# Patient Record
Sex: FEMALE | Race: WHITE | NOT HISPANIC OR LATINO | Employment: FULL TIME | URBAN - METROPOLITAN AREA
[De-identification: names, ages, dates, MRNs, and addresses within clinical notes are randomized per-mention and may not be internally consistent; named-entity substitution may affect disease eponyms.]

---

## 2017-01-31 ENCOUNTER — MYC MEDICAL ADVICE (OUTPATIENT)
Dept: FAMILY MEDICINE | Facility: CLINIC | Age: 27
End: 2017-01-31

## 2017-01-31 DIAGNOSIS — K58.0 IRRITABLE BOWEL SYNDROME WITH DIARRHEA: Primary | ICD-10-CM

## 2017-02-17 ENCOUNTER — OFFICE VISIT (OUTPATIENT)
Dept: FAMILY MEDICINE | Facility: OTHER | Age: 27
End: 2017-02-17

## 2017-02-17 VITALS
HEIGHT: 65 IN | WEIGHT: 198 LBS | DIASTOLIC BLOOD PRESSURE: 72 MMHG | HEART RATE: 117 BPM | OXYGEN SATURATION: 98 % | TEMPERATURE: 98.4 F | RESPIRATION RATE: 20 BRPM | BODY MASS INDEX: 32.99 KG/M2 | SYSTOLIC BLOOD PRESSURE: 110 MMHG

## 2017-02-17 DIAGNOSIS — Z01.818 PREOP GENERAL PHYSICAL EXAM: Primary | ICD-10-CM

## 2017-02-17 DIAGNOSIS — S82.891K ANKLE FRACTURE, RIGHT, CLOSED, WITH NONUNION, SUBSEQUENT ENCOUNTER: ICD-10-CM

## 2017-02-17 PROCEDURE — 99214 OFFICE O/P EST MOD 30 MIN: CPT | Performed by: NURSE PRACTITIONER

## 2017-02-17 RX ORDER — ASPIRIN 81 MG/1
81 TABLET, CHEWABLE ORAL DAILY
Qty: 108 TABLET | Refills: 3
Start: 2017-02-17 | End: 2017-07-20

## 2017-02-17 NOTE — PATIENT INSTRUCTIONS
Don't take any Ibuprofen prior to surgery  Ask the surgeon about your Aspirin.       Before Your Surgery      Call your surgeon if there is any change in your health. This includes signs of a cold or flu (such as a sore throat, runny nose, cough, rash or fever).    Do not smoke, drink alcohol or take over the counter medicine (unless your surgeon or primary care doctor tells you to) for the 24 hours before and after surgery.    If you take prescribed drugs: Follow your doctor s orders about which medicines to take and which to stop until after surgery.    Eating and drinking prior to surgery: follow the instructions from your surgeon    Take a shower or bath the night before surgery. Use the soap your surgeon gave you to gently clean your skin. If you do not have soap from your surgeon, use your regular soap. Do not shave or scrub the surgery site.  Wear clean pajamas and have clean sheets on your bed.

## 2017-02-17 NOTE — MR AVS SNAPSHOT
After Visit Summary   2/17/2017    Wyatt Vines    MRN: [id number]           Patient Information     Date Of Birth          1990        Visit Information        Provider Department      2/17/2017 1:20 PM Linnette Garcia APRN [facility]        Today's Diagnoses     Preop general physical exam    -  1      Care Instructions      Don't take any Ibuprofen prior to surgery  Ask the surgeon about your Aspirin.       Before Your Surgery      Call your surgeon if there is any change in your health. This includes signs of a cold or flu (such as a sore throat, runny nose, cough, rash or fever).    Do not smoke, drink alcohol or take over the counter medicine (unless your surgeon or primary care doctor tells you to) for the 24 hours before and after surgery.    If you take prescribed drugs: Follow your doctor s orders about which medicines to take and which to stop until after surgery.    Eating and drinking prior to surgery: follow the instructions from your surgeon    Take a shower or bath the night before surgery. Use the soap your surgeon gave you to gently clean your skin. If you do not have soap from your surgeon, use your regular soap. Do not shave or scrub the surgery site.  Wear clean pajamas and have clean sheets on your bed.         Follow-ups after your visit        Who to contact     If you have questions or need follow up information about today's clinic visit or your schedule please contact [facility] directly at [phone].  Normal or non-critical lab and imaging results will be communicated to you by MyChart, letter or phone within 4 business days after the clinic has received the results. If you do not hear from us within 7 days, please contact the clinic through [employer] or phone. If you have a critical or abnormal lab result, we will notify you by phone as soon as possible.  Submit refill requests through "[employer] or call your pharmacy and they will  "forward the refill request to us. Please allow 3 business days for your refill to be completed.          Additional Information About Your Visit        [employer] Information     [employer] gives you secure access to your electronic health record. If you see a primary care provider, you can also send messages to your care team and make appointments. If you have questions, please call your primary care clinic.  If you do not have a primary care provider, please call [phone] and they will assist you.        Care EveryWhere ID     This is your Care EveryWhere ID. This could be used by other organizations to access your [facility] medical records  [id number]        Your Vitals Were     Pulse Temperature Respirations Height Last Period Pulse Oximetry    117 98.4  F (36.9  C) (Tympanic) 20 5' 5.2\" (1.656 m) 02/11/2017 98%    Breastfeeding? BMI (Body Mass Index)                No 32.75 kg/m2           Blood Pressure from Last 3 Encounters:   02/17/17 110/72   12/28/16 120/70   08/03/16 136/78    Weight from Last 3 Encounters:   02/17/17 198 lb (89.8 kg)   08/03/16 203 lb (92.1 kg)   02/29/16 210 lb (95.3 kg)              Today, you had the following     No orders found for display         Today's Medication Changes          These changes are accurate as of: 2/17/17  1:53 PM.  If you have any questions, ask your nurse or doctor.               Start taking these medicines.        Dose/Directions    aspirin 81 MG chewable tablet   Used for:  Preop general physical exam   Started by:  Linnette Garcia APRN CNP        Dose:  81 mg   Take 1 tablet (81 mg) by mouth daily   Quantity:  108 tablet   Refills:  3            Where to get your medicines      Some of these will need a paper prescription and others can be bought over the counter.  Ask your nurse if you have questions.     You don't need a prescription for these medications     aspirin 81 MG chewable tablet                Primary Care Provider Office Phone # Fax #    " Amber Crockett PA-C [phone]       [address] [place] [address]        Thank you!     Thank you for choosing [facility]  for your care. Our goal is always to provide you with excellent care. Hearing back from our patients is one way we can continue to improve our services. Please take a few minutes to complete the written survey that you may receive in the mail after your visit with us. Thank you!             Your Updated Medication List - Protect others around you: Learn how to safely use, store and throw away your medicines at www.disposemymeds.org.          This list is accurate as of: 2/17/17  1:53 PM.  Always use your most recent med list.                   Brand Name Dispense Instructions for use    amitriptyline 10 MG tablet    ELAVIL    180 tablet    Take 2 tablets (20 mg) by mouth At Bedtime       aspirin 81 MG chewable tablet     108 tablet    Take 1 tablet (81 mg) by mouth daily
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
## 2017-02-17 NOTE — NURSING NOTE
"Chief Complaint   Patient presents with     Pre-Op Exam     [place] [facility] [place]       Initial /72 (BP Location: Left arm, Cuff Size: Adult Large)  Pulse 117  Temp 98.4  F (36.9  C) (Tympanic)  Resp 20  Ht 5' 5.2\" (1.656 m)  Wt 198 lb (89.8 kg)  LMP 02/11/2017  SpO2 98%  Breastfeeding? No  BMI 32.75 kg/m2 Estimated body mass index is 32.75 kg/(m^2) as calculated from the following:    Height as of this encounter: 5' 5.2\" (1.656 m).    Weight as of this encounter: 198 lb (89.8 kg).  Medication Reconciliation: complete   ................Bhaskar Portillo LPN,   February 17, 2017,      1:30 PM,   [facility]    "

## 2017-03-09 ENCOUNTER — TRANSFERRED RECORDS (OUTPATIENT)
Dept: HEALTH INFORMATION MANAGEMENT | Facility: CLINIC | Age: 27
End: 2017-03-09

## 2017-05-23 ENCOUNTER — MYC MEDICAL ADVICE (OUTPATIENT)
Dept: FAMILY MEDICINE | Facility: CLINIC | Age: 27
End: 2017-05-23

## 2017-05-24 ENCOUNTER — ALLIED HEALTH/NURSE VISIT (OUTPATIENT)
Dept: FAMILY MEDICINE | Facility: CLINIC | Age: 27
End: 2017-05-24
Payer: COMMERCIAL

## 2017-05-24 ENCOUNTER — TELEPHONE (OUTPATIENT)
Dept: FAMILY MEDICINE | Facility: CLINIC | Age: 27
End: 2017-05-24

## 2017-05-24 VITALS — WEIGHT: 195.5 LBS | SYSTOLIC BLOOD PRESSURE: 110 MMHG | DIASTOLIC BLOOD PRESSURE: 78 MMHG | BODY MASS INDEX: 32.33 KG/M2

## 2017-05-24 DIAGNOSIS — N91.2 ABSENCE OF MENSTRUATION: Primary | ICD-10-CM

## 2017-05-24 LAB
B-HCG SERPL-ACNC: 192 IU/L (ref 0–5)
BETA HCG QUAL IFA URINE: NEGATIVE

## 2017-05-24 PROCEDURE — 99207 ZZC NO CHARGE NURSE ONLY: CPT

## 2017-05-24 PROCEDURE — 36415 COLL VENOUS BLD VENIPUNCTURE: CPT | Performed by: PHYSICIAN ASSISTANT

## 2017-05-24 PROCEDURE — 84702 CHORIONIC GONADOTROPIN TEST: CPT | Performed by: PHYSICIAN ASSISTANT

## 2017-05-24 PROCEDURE — 84703 CHORIONIC GONADOTROPIN ASSAY: CPT | Performed by: PHYSICIAN ASSISTANT

## 2017-05-24 NOTE — NURSING NOTE
Wyatt Bruno is a 27 year old here today for a pregnancy test.  LMP: Patient's last menstrual period was 2017 (exact date).  Wt: 0 lbs 0 oz.    Symptoms include nausea &/or vomiting and fatigue.    Wyatt informed of positive pregnancy test results. BEATRICE: 18    Educational advice given: nutrition, smoking and drugs & alcohol.    Current medications reviewed: Yes    Previous pregnancy history remarkable for: none.    Plan: schedule appointment with OB Educator and/or OB class, follow-up appointment with GHULAM Crockett for pre- care, take multivitamin or pre-osei vitamins and OB Education packet given.    She is to call back if she has any questions or concerns.  She is advised to notify a provider immediately if she experiences any severe cramping or abdominal pain or any vaginal bleeding.  Darlene Lewis RN

## 2017-05-24 NOTE — MR AVS SNAPSHOT
After Visit Summary   5/24/2017    Wyatt Bruno    MRN: [id number]           Patient Information     Date Of Birth          1990        Visit Information        Provider Department      5/24/2017 5:00 PM NL RN TEAM A, [facility]        Today's Diagnoses     Absence of menstruation    -  1       Follow-ups after your visit        Who to contact     If you have questions or need follow up information about today's clinic visit or your schedule please contact [facility] directly at [phone].  Normal or non-critical lab and imaging results will be communicated to you by [employer], letter or phone within 4 business days after the clinic has received the results. If you do not hear from us within 7 days, please contact the clinic through [employer] or phone. If you have a critical or abnormal lab result, we will notify you by phone as soon as possible.  Submit refill requests through [employer] or call your pharmacy and they will forward the refill request to us. Please allow 3 business days for your refill to be completed.          Additional Information About Your Visit        MyChart Information     [employer] gives you secure access to your electronic health record. If you see a primary care provider, you can also send messages to your care team and make appointments. If you have questions, please call your primary care clinic.  If you do not have a primary care provider, please call [phone] and they will assist you.        Care EveryWhere ID     This is your Care EveryWhere ID. This could be used by other organizations to access your [facility] medical records  [id number]        Your Vitals Were     Last Period Breastfeeding? BMI (Body Mass Index)             04/28/2017 (Exact Date) No 32.33 kg/m2          Blood Pressure from Last 3 Encounters:   05/24/17 110/78   02/17/17 110/72   12/28/16 120/70    Weight from Last 3 Encounters:   05/24/17 195 lb 8 oz (88.7 kg)    02/17/17 198 lb (89.8 kg)   08/03/16 203 lb (92.1 kg)              We Performed the Following     Beta HCG qual IFA urine     HCG, quantitative, pregnancy        Primary Care Provider Office Phone # Fax #    Amber Crockett PA-C [phone]       [address] [place] [address]        Thank you!     Thank you for choosing [facility]  for your care. Our goal is always to provide you with excellent care. Hearing back from our patients is one way we can continue to improve our services. Please take a few minutes to complete the written survey that you may receive in the mail after your visit with us. Thank you!             Your Updated Medication List - Protect others around you: Learn how to safely use, store and throw away your medicines at www.disposemymeds.org.          This list is accurate as of: 5/24/17  5:21 PM.  Always use your most recent med list.                   Brand Name Dispense Instructions for use    amitriptyline 10 MG tablet    ELAVIL    180 tablet    Take 2 tablets (20 mg) by mouth At Bedtime       aspirin 81 MG chewable tablet     108 tablet    Take 1 tablet (81 mg) by mouth daily

## 2017-05-24 NOTE — TELEPHONE ENCOUNTER
Patient is here today for a pregnancy test.  She is reporting she was at [facility] yesterday and was getting a CT of her ankle, so was informed she needed to take a pregnancy test.  This test came back positive, so she came in today to get the process started with PCP.  Today patient's urine HCG is negative.    Patient is given folder and vitals were taken.  Patient was informed to go to lab for blood test and we will call her tomorrow with results and next steps.   Patient understands and agrees to this plan.    Routing to PCP for further advice.    Darlene Lewis RN

## 2017-05-25 ENCOUNTER — TELEPHONE (OUTPATIENT)
Dept: FAMILY MEDICINE | Facility: CLINIC | Age: 27
End: 2017-05-25

## 2017-05-25 NOTE — TELEPHONE ENCOUNTER
Reason for Call:  Request for results:    Name of test or procedure: HCG Quantitative Serum    Date of test of procedure: 05/24/17    Location of the test or procedure: [facility]    OK to leave the result message on voice mail or with a family member? YES    Phone number Patient can be reached at:  Home number on file [phone] (home)    Additional comments:     Call taken on 5/25/2017 at 12:58 PM by Arely Nuñez

## 2017-05-25 NOTE — TELEPHONE ENCOUNTER
Called pt and informed her of the results and transferred her to make an appt with OB intake.  Darlene Ferrer MA

## 2017-05-25 NOTE — TELEPHONE ENCOUNTER
Quant HCG is positive - will need to coordinate OB visit with Robyn.  Electronically signed by Amber Crockett PA-C  5/25/2017

## 2017-05-26 ENCOUNTER — HEALTH MAINTENANCE LETTER (OUTPATIENT)
Age: 27
End: 2017-05-26

## 2017-05-30 ENCOUNTER — PRENATAL OFFICE VISIT (OUTPATIENT)
Dept: FAMILY MEDICINE | Facility: CLINIC | Age: 27
End: 2017-05-30
Payer: COMMERCIAL

## 2017-05-30 DIAGNOSIS — Z34.80 OTHER NORMAL PREGNANCY, NOT FIRST: Primary | ICD-10-CM

## 2017-05-30 PROCEDURE — 99207 ZZC FIRST OB VISIT: CPT

## 2017-05-30 RX ORDER — PRENATAL VIT/IRON FUM/FOLIC AC 27MG-0.8MG
1 TABLET ORAL DAILY
COMMUNITY
End: 2018-03-12

## 2017-05-30 NOTE — PROGRESS NOTES
1. Have you had chicken pox?   Yes    Genetic Screening    Has the patient, baby's father, or anyone in either family had:     1. Thalassemia and and MCV result less than 80?No   2.  Neural tube defect? No   3.  Congenital heart defect?No   4. Down's syndrome?No   5.  Maurizio-Sachs disease?No   6. Sickle Cell disease or trait?No   7. Hemophilia or other inherited problems of blood?No   8. Muscular dystropy?No   9.  Cystic fibrosis?No  10. Chamberino's Chorea?No  11. Mental Retardation or autism?  No         If yes, was the person tested for Fragile X?   12. Any other inherited genetic or chromosomal disorders?No  13. Metabolic disorders such as diabetes or PKU?No  14. A child born with defects not listed above?No  15. Recurrent pregnancy loss or stillbirth?No  16.  Has the patient had any medications/street drugs/alcohol since her last menstrual period?No  18.  Does the patient or baby's father have any other genetic risks. No

## 2017-05-30 NOTE — MR AVS SNAPSHOT
After Visit Summary   5/30/2017    Wyatt Bruno    MRN: [id number]           Patient Information     Date Of Birth          1990        Visit Information        Provider Department      5/30/2017 12:00 PM NL OB INTAKE [facility]        Today's Diagnoses     Other normal pregnancy, not first    -  1       Follow-ups after your visit        Your next 10 appointments already scheduled     Jun 26, 2017  4:45 PM CDT   US OB < 14 WEEKS SINGLE with PHUS1   [facility] Ul[facility]ound ([facility])    [address] [place] [address] [id number] [phone]           Please bring a list of your medicines (including vitamins, minerals and over-the-counter drugs). Also, tell your doctor about any allergies you may have. Wear comfortable clothes and leave your valuables at home.  If you re less than 20 weeks drink four 8-ounce glasses of fluid an hour before your exam. If you need to empty your bladder before your exam, try to release only a little urine. Then, drink another glass of fluid.  You may have up to two family members in the exam room. If you bring a small child, an adult must be there to care for him or her.  Please call the Imaging Department at your exam site with any questions.            Jun 26, 2017  5:30 PM CDT   LAB with NL LAB PMC   [facility] ([facility])    [address] [place] [address] [id number] [phone]           Patient must bring picture ID.  Patient should be prepared to give a urine specimen  Please do not eat 10-12 hours before your appointment if you are coming in fasting for labs on lipids, cholesterol, or glucose (sugar).  Pregnant women should follow their Care Team instructions. Water with medications is okay. Do not drink coffee or other fluids.   If you have concerns about taking  your medications, please ask at office or if scheduling via Pieceable, send a message by clicking on Secure  Messaging, Message Your Care Team.            Jul 20, 2017  5:15 PM CDT   New Prenatal with Amber Crockett PA-C   [facility] ([facility])    [address] [phone] [phone]              Future tests that were ordered for you today     Open Standing Orders        Priority Remaining Interval Expires Ordered    Anti treponema EIA Routine 1/1 7/1/2017 5/30/2017    HIV Antigen Antibody Combo Routine 1/1 7/1/2017 5/30/2017    HEPATITIS B SURFACE ANTIGEN Routine 1/1 7/1/2017 5/30/2017    CBC WITH PLATELETS Routine 1/1 7/1/2017 5/30/2017    Rubella Antibody IgG Quantitative Routine 1/1 7/1/2017 5/30/2017    ABO/Rh type and screen Routine 1/1 7/1/2017 5/30/2017    *UA reflex to Microscopic and Culture ([place]; [facility]; [facility]; [facility]; [facility]; [facility]; [place]; [place]) Routine 1/1 7/1/2017 5/30/2017          Open Future Orders        Priority Expected Expires Ordered    US OB < 14 Weeks Single Routine 5/30/2017 5/30/2018 5/30/2017            Who to contact     If you have questions or need follow up information about today's clinic visit or your schedule please contact [facility] directly at [phone].  Normal or non-critical lab and imaging results will be communicated to you by MyChart, letter or phone within 4 business days after the clinic has received the results. If you do not hear from us within 7 days, please contact the clinic through [employer] or phone. If you have a critical or abnormal lab result, we will notify you by phone as soon as possible.  Submit refill requests through AKT or call your pharmacy and they will forward the refill request to us. Please allow 3 business days for your refill to be completed.          Additional Information About Your Visit        MyChart Information     AKT gives you secure access to your electronic health record. If you see a primary care  provider, you can also send messages to your care team and make appointments. If you have questions, please call your primary care clinic.  If you do not have a primary care provider, please call [phone] and they will assist you.        Care EveryWhere ID     This is your Care EveryWhere ID. This could be used by other organizations to access your [place] medical records  [id number]        Your Vitals Were     Last Period                   04/28/2017 (Exact Date)            Blood Pressure from Last 3 Encounters:   05/24/17 110/78   02/17/17 110/72   12/28/16 120/70    Weight from Last 3 Encounters:   05/24/17 195 lb 8 oz (88.7 kg)   02/17/17 198 lb (89.8 kg)   08/03/16 203 lb (92.1 kg)               Primary Care Provider Office Phone # Fax #    Amber Crockett PA-C [phone]       [address] [place] [address]        Thank you!     Thank you for choosing [facility]  for your care. Our goal is always to provide you with excellent care. Hearing back from our patients is one way we can continue to improve our services. Please take a few minutes to complete the written survey that you may receive in the mail after your visit with us. Thank you!             Your Updated Medication List - Protect others around you: Learn how to safely use, store and throw away your medicines at www.disposemymeds.org.          This list is accurate as of: 5/30/17 12:40 PM.  Always use your most recent med list.                   Brand Name Dispense Instructions for use    amitriptyline 10 MG tablet    ELAVIL    180 tablet    Take 2 tablets (20 mg) by mouth At Bedtime       aspirin 81 MG chewable tablet     108 tablet    Take 1 tablet (81 mg) by mouth daily       prenatal multivitamin  plus iron 27-0.8 MG Tabs per tablet      Take 1 tablet by mouth daily

## 2017-06-06 ENCOUNTER — MYC MEDICAL ADVICE (OUTPATIENT)
Dept: FAMILY MEDICINE | Facility: CLINIC | Age: 27
End: 2017-06-06

## 2017-06-06 NOTE — TELEPHONE ENCOUNTER
This is fine for her to use.  There is unlikely to be high enough content to be an issue.  Will have staff notify patient.    Adán Germain MD

## 2017-06-26 ENCOUNTER — HOSPITAL ENCOUNTER (OUTPATIENT)
Dept: ULTRASOUND IMAGING | Facility: CLINIC | Age: 27
Discharge: HOME | End: 2017-06-26
Attending: PHYSICIAN ASSISTANT | Admitting: PHYSICIAN ASSISTANT
Payer: COMMERCIAL

## 2017-06-26 DIAGNOSIS — Z34.80 OTHER NORMAL PREGNANCY, NOT FIRST: ICD-10-CM

## 2017-06-26 LAB
ALBUMIN UR-MCNC: NEGATIVE MG/DL
APPEARANCE UR: CLEAR
BILIRUB UR QL STRIP: NEGATIVE
COLOR UR AUTO: YELLOW
ERYTHROCYTE [DISTWIDTH] IN BLOOD BY AUTOMATED COUNT: 16.4 % (ref 10–15)
GLUCOSE UR STRIP-MCNC: NEGATIVE MG/DL
HCT VFR BLD AUTO: 35.8 % (ref 35–47)
HGB BLD-MCNC: 11.2 G/DL (ref 11.7–15.7)
HGB UR QL STRIP: NEGATIVE
KETONES UR STRIP-MCNC: NEGATIVE MG/DL
LEUKOCYTE ESTERASE UR QL STRIP: NEGATIVE
MCH RBC QN AUTO: 26 PG (ref 26.5–33)
MCHC RBC AUTO-ENTMCNC: 31.3 G/DL (ref 31.5–36.5)
MCV RBC AUTO: 83 FL (ref 78–100)
NITRATE UR QL: NEGATIVE
PH UR STRIP: 6 PH (ref 5–7)
PLATELET # BLD AUTO: 234 10E9/L (ref 150–450)
RBC # BLD AUTO: 4.31 10E12/L (ref 3.8–5.2)
SP GR UR STRIP: 1.01 (ref 1–1.03)
URN SPEC COLLECT METH UR: NORMAL
UROBILINOGEN UR STRIP-MCNC: 0 MG/DL (ref 0–2)
WBC # BLD AUTO: 7.3 10E9/L (ref 4–11)

## 2017-06-26 PROCEDURE — 86762 RUBELLA ANTIBODY: CPT | Performed by: PHYSICIAN ASSISTANT

## 2017-06-26 PROCEDURE — 86901 BLOOD TYPING SEROLOGIC RH(D): CPT | Performed by: PHYSICIAN ASSISTANT

## 2017-06-26 PROCEDURE — 81003 URINALYSIS AUTO W/O SCOPE: CPT | Performed by: PHYSICIAN ASSISTANT

## 2017-06-26 PROCEDURE — 85027 COMPLETE CBC AUTOMATED: CPT | Performed by: PHYSICIAN ASSISTANT

## 2017-06-26 PROCEDURE — 86900 BLOOD TYPING SEROLOGIC ABO: CPT | Performed by: PHYSICIAN ASSISTANT

## 2017-06-26 PROCEDURE — 87340 HEPATITIS B SURFACE AG IA: CPT | Performed by: PHYSICIAN ASSISTANT

## 2017-06-26 PROCEDURE — 86780 TREPONEMA PALLIDUM: CPT | Performed by: PHYSICIAN ASSISTANT

## 2017-06-26 PROCEDURE — 36415 COLL VENOUS BLD VENIPUNCTURE: CPT | Performed by: PHYSICIAN ASSISTANT

## 2017-06-26 PROCEDURE — 87389 HIV-1 AG W/HIV-1&-2 AB AG IA: CPT | Performed by: PHYSICIAN ASSISTANT

## 2017-06-26 PROCEDURE — 86850 RBC ANTIBODY SCREEN: CPT | Performed by: PHYSICIAN ASSISTANT

## 2017-06-26 PROCEDURE — 76801 OB US < 14 WKS SINGLE FETUS: CPT

## 2017-06-27 LAB
ABO + RH BLD: NORMAL
ABO + RH BLD: NORMAL
BLD GP AB SCN SERPL QL: NORMAL
BLOOD BANK CMNT PATIENT-IMP: NORMAL
HBV SURFACE AG SERPL QL IA: NONREACTIVE
HIV 1+2 AB+HIV1 P24 AG SERPL QL IA: NORMAL
SPECIMEN EXP DATE BLD: NORMAL
T PALLIDUM IGG+IGM SER QL: NEGATIVE

## 2017-06-28 LAB — RUBV IGG SERPL IA-ACNC: 37 IU/ML

## 2017-07-03 NOTE — PROGRESS NOTES
[facility] - Your early ultrasound shows a normally progressing pregnancy with due date of 1/31/2018.  We will review and discuss further/ at your upcoming first OB visit. Typically your first OB visit should be at about the 12 week mary - if you are having problems getting in let us know.

## 2017-07-20 ENCOUNTER — PRENATAL OFFICE VISIT (OUTPATIENT)
Dept: FAMILY MEDICINE | Facility: CLINIC | Age: 27
End: 2017-07-20
Payer: COMMERCIAL

## 2017-07-20 VITALS
WEIGHT: 193 LBS | HEART RATE: 70 BPM | TEMPERATURE: 99.1 F | SYSTOLIC BLOOD PRESSURE: 116 MMHG | OXYGEN SATURATION: 98 % | BODY MASS INDEX: 31.92 KG/M2 | RESPIRATION RATE: 18 BRPM | DIASTOLIC BLOOD PRESSURE: 80 MMHG

## 2017-07-20 DIAGNOSIS — Z34.81 ENCOUNTER FOR SUPERVISION OF OTHER NORMAL PREGNANCY IN FIRST TRIMESTER: Primary | ICD-10-CM

## 2017-07-20 DIAGNOSIS — D64.9 LOW HEMOGLOBIN: ICD-10-CM

## 2017-07-20 PROCEDURE — 99207 ZZC FIRST OB VISIT: CPT | Performed by: PHYSICIAN ASSISTANT

## 2017-07-20 PROCEDURE — 87591 N.GONORRHOEAE DNA AMP PROB: CPT | Performed by: PHYSICIAN ASSISTANT

## 2017-07-20 PROCEDURE — G0145 SCR C/V CYTO,THINLAYER,RESCR: HCPCS | Performed by: PHYSICIAN ASSISTANT

## 2017-07-20 PROCEDURE — 87491 CHLMYD TRACH DNA AMP PROBE: CPT | Performed by: PHYSICIAN ASSISTANT

## 2017-07-20 ASSESSMENT — PAIN SCALES - GENERAL: PAINLEVEL: NO PAIN (0)
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
## 2017-07-20 NOTE — NURSING NOTE
"Chief Complaint   Patient presents with     Prenatal Care       Initial /80  Pulse 70  Temp 99.1  F (37.3  C) (Tympanic)  Resp 18  Wt 193 lb (87.5 kg)  LMP 04/28/2017 (Exact Date)  SpO2 98%  BMI 31.92 kg/m2 Estimated body mass index is 31.92 kg/(m^2) as calculated from the following:    Height as of 2/17/17: 5' 5.2\" (1.656 m).    Weight as of this encounter: 193 lb (87.5 kg).  BP completed using cuff size: eduar Ferrer MA      "

## 2017-07-20 NOTE — MR AVS SNAPSHOT
After Visit Summary   7/20/2017    Wyatt Bruno    MRN: [id number]           Patient Information     Date Of Birth          1990        Visit Information        Provider Department      7/20/2017 5:15 PM Amber Crockett PA-C [facility]        Today's Diagnoses     Encounter for supervision of other normal pregnancy in first trimester    -  1       Follow-ups after your visit        Who to contact     If you have questions or need follow up information about today's clinic visit or your schedule please contact [facility] directly at [phone].  Normal or non-critical lab and imaging results will be communicated to you by "[employer]hart, letter or phone within 4 business days after the clinic has received the results. If you do not hear from us within 7 days, please contact the clinic through [employer] or phone. If you have a critical or abnormal lab result, we will notify you by phone as soon as possible.  Submit refill requests through [facility] or call your pharmacy and they will forward the refill request to us. Please allow 3 business days for your refill to be completed.          Additional Information About Your Visit        MyChart Information     [facility] gives you secure access to your electronic health record. If you see a primary care provider, you can also send messages to your care team and make appointments. If you have questions, please call your primary care clinic.  If you do not have a primary care provider, please call [phone] and they will assist you.        Care EveryWhere ID     This is your Care EveryWhere ID. This could be used by other organizations to access your [place] medical records  [id number]        Your Vitals Were     Pulse Temperature Respirations Last Period Pulse Oximetry BMI (Body Mass Index)    70 99.1  F (37.3  C) (Tympanic) 18 04/28/2017 (Exact Date) 98% 31.92 kg/m2       Blood Pressure from Last 3 Encounters:   07/20/17  116/80   05/24/17 110/78   02/17/17 110/72    Weight from Last 3 Encounters:   07/20/17 193 lb (87.5 kg)   05/24/17 195 lb 8 oz (88.7 kg)   02/17/17 198 lb (89.8 kg)              We Performed the Following     Chlamydia trachomatis PCR     Neisseria gonorrhoeae PCR     Pap imaged thin layer screen reflex to HPV if ASCUS - recommend age 25 - 29        Primary Care Provider Office Phone # Fax #    Amber Crockett PA-C [phone]       [facility] [address] [place] [address]        Equal Access to Services     [facility] : Hadii aad ku hadasho Soomaali, waaxda luqadaha, qaybta kaalmada adeegyada, octaviano giles . So [facility] [phone].    ATENCIÓN: Si habla español, tiene a grimaldo disposición servicios gratuitos de asistencia lingüística. Llame al [phone].    We comply with applicable federal civil rights laws and [place] laws. We do not discriminate on the basis of race, color, national origin, age, disability sex, sexual orientation or gender identity.            Thank you!     Thank you for choosing [facility]  for your care. Our goal is always to provide you with excellent care. Hearing back from our patients is one way we can continue to improve our services. Please take a few minutes to complete the written survey that you may receive in the mail after your visit with us. Thank you!             Your Updated Medication List - Protect others around you: Learn how to safely use, store and throw away your medicines at www.disposemymeds.org.          This list is accurate as of: 7/20/17 11:59 PM.  Always use your most recent med list.                   Brand Name Dispense Instructions for use Diagnosis    prenatal multivitamin  plus iron 27-0.8 MG Tabs per tablet      Take 1 tablet by mouth daily

## 2017-07-21 PROBLEM — D64.9 LOW HEMOGLOBIN: Status: ACTIVE | Noted: 2017-07-21

## 2017-07-21 PROBLEM — Z34.90 SUPERVISION OF NORMAL PREGNANCY: Status: ACTIVE | Noted: 2017-07-21

## 2017-07-21 PROBLEM — L90.5 SCAR OF ABDOMEN: Status: ACTIVE | Noted: 2017-07-21

## 2017-07-23 LAB
C TRACH DNA SPEC QL NAA+PROBE: NORMAL
N GONORRHOEA DNA SPEC QL NAA+PROBE: NORMAL
SPECIMEN SOURCE: NORMAL
SPECIMEN SOURCE: NORMAL

## 2017-07-24 ENCOUNTER — MYC MEDICAL ADVICE (OUTPATIENT)
Dept: FAMILY MEDICINE | Facility: CLINIC | Age: 27
End: 2017-07-24

## 2017-07-24 ENCOUNTER — ALLIED HEALTH/NURSE VISIT (OUTPATIENT)
Dept: FAMILY MEDICINE | Facility: OTHER | Age: 27
End: 2017-07-24
Payer: COMMERCIAL

## 2017-07-24 DIAGNOSIS — Z11.1 SCREENING EXAMINATION FOR PULMONARY TUBERCULOSIS: Primary | ICD-10-CM

## 2017-07-24 PROCEDURE — 86480 TB TEST CELL IMMUN MEASURE: CPT | Performed by: NURSE PRACTITIONER

## 2017-07-24 PROCEDURE — 99207 ZZC NO CHARGE NURSE ONLY: CPT

## 2017-07-24 PROCEDURE — 36415 COLL VENOUS BLD VENIPUNCTURE: CPT | Performed by: NURSE PRACTITIONER

## 2017-07-24 NOTE — LETTER
Wyatt Bruno  [address]  [place] [address]        July 26, 2017          Dear ,    We are writing to inform you of your test results.    Negative TB results.     Resulted Orders   M Tuberculosis by Quantiferon   Result Value Ref Range    M Tuberculosis Result Negative NEG    M Tuberculosis Antigen Value 0.00 IU/mL      Comment:      This is a qualitative test.  The TB antigen IU/mL value is required for   documentation on certain government reporting forms but this value should not   be used to monitor disease progression or response to therapy.   Diagnosing or excluding tuberculosis disease, and assessing the probability   of   LTBI, require a combination of epidemiological, historical, medical and   diagnostic findings that should be taken into account when interpreting   QuantiFERON TB results.         If you have any questions or concerns, please call the clinic at the number listed above.       Sincerely,        Linnette Garcia CNP

## 2017-07-24 NOTE — NURSING NOTE
Chief Complaint   Patient presents with     Allied Health Visit     TB test     Patient is pregnant so orders were placed for a blood draw per Linnette Garcia CNP.  Nell Suarez MA     7/24/2017

## 2017-07-24 NOTE — MR AVS SNAPSHOT
After Visit Summary   7/24/2017    Wyatt Bruno    MRN: [id number]           Patient Information     Date Of Birth          1990        Visit Information        Provider Department      7/24/2017 10:00 AM ALPESH VILLEDA NURSE, [facility]        Today's Diagnoses     Screening examination for pulmonary tuberculosis    -  1       Follow-ups after your visit        Who to contact     If you have questions or need follow up information about today's clinic visit or your schedule please contact [facility] directly at [phone].  Normal or non-critical lab and imaging results will be communicated to you by [employer], letter or phone within 4 business days after the clinic has received the results. If you do not hear from us within 7 days, please contact the clinic through [employer] or phone. If you have a critical or abnormal lab result, we will notify you by phone as soon as possible.  Submit refill requests through [employer] or call your pharmacy and they will forward the refill request to us. Please allow 3 business days for your refill to be completed.          Additional Information About Your Visit        MyChart Information     [employer] gives you secure access to your electronic health record. If you see a primary care provider, you can also send messages to your care team and make appointments. If you have questions, please call your primary care clinic.  If you do not have a primary care provider, please call [phone] and they will assist you.        Care EveryWhere ID     This is your Care EveryWhere ID. This could be used by other organizations to access your [place] medical records  [id number]        Your Vitals Were     Last Period                   04/28/2017 (Exact Date)            Blood Pressure from Last 3 Encounters:   07/20/17 116/80   05/24/17 110/78   02/17/17 110/72    Weight from Last 3 Encounters:   07/20/17 193 lb (87.5 kg)   05/24/17 195 lb 8 oz  (88.7 kg)   02/17/17 198 lb (89.8 kg)              We Performed the Following     M Tuberculosis by Quantiferon        Primary Care Provider Office Phone # Fax #    Amber Crockett PA-C [phone]       [place] [address] [place] [address]        Equal Access to Services     [facility]: Hadii aad ku hadasho Soomaali, waaxda luqadaha, qaybta kaalmada adeegyada, waxay idiin hayaan adeeg kharash lalongn . So [facility] [phone].    ATENCIÓN: Si habla español, tiene a grimaldo disposición servicios gratuitos de asistencia lingüística. Llame al [phone].    We comply with applicable federal civil rights laws and [place] laws. We do not discriminate on the basis of race, color, national origin, age, disability sex, sexual orientation or gender identity.            Thank you!     Thank you for choosing [facility]  for your care. Our goal is always to provide you with excellent care. Hearing back from our patients is one way we can continue to improve our services. Please take a few minutes to complete the written survey that you may receive in the mail after your visit with us. Thank you!             Your Updated Medication List - Protect others around you: Learn how to safely use, store and throw away your medicines at www.disposemymeds.org.          This list is accurate as of: 7/24/17 10:45 AM.  Always use your most recent med list.                   Brand Name Dispense Instructions for use Diagnosis    prenatal multivitamin  plus iron 27-0.8 MG Tabs per tablet      Take 1 tablet by mouth daily

## 2017-07-25 LAB
COPATH REPORT: NORMAL
M TB TUBERC IFN-G BLD QL: NEGATIVE
M TB TUBERC IFN-G/MITOGEN IGNF BLD: 0 IU/ML
PAP: NORMAL

## 2017-08-17 ENCOUNTER — PRENATAL OFFICE VISIT (OUTPATIENT)
Dept: FAMILY MEDICINE | Facility: CLINIC | Age: 27
End: 2017-08-17
Payer: COMMERCIAL

## 2017-08-17 VITALS
HEART RATE: 68 BPM | DIASTOLIC BLOOD PRESSURE: 70 MMHG | BODY MASS INDEX: 32.42 KG/M2 | WEIGHT: 196 LBS | SYSTOLIC BLOOD PRESSURE: 116 MMHG | TEMPERATURE: 98.8 F | OXYGEN SATURATION: 98 % | RESPIRATION RATE: 18 BRPM

## 2017-08-17 DIAGNOSIS — Z34.80 SUPERVISION OF OTHER NORMAL PREGNANCY, ANTEPARTUM: Primary | ICD-10-CM

## 2017-08-17 PROBLEM — Z34.90 SUPERVISION OF NORMAL PREGNANCY: Status: RESOLVED | Noted: 2017-07-21 | Resolved: 2017-08-17

## 2017-08-17 PROCEDURE — 99000 SPECIMEN HANDLING OFFICE-LAB: CPT | Performed by: PHYSICIAN ASSISTANT

## 2017-08-17 PROCEDURE — 36415 COLL VENOUS BLD VENIPUNCTURE: CPT | Performed by: PHYSICIAN ASSISTANT

## 2017-08-17 PROCEDURE — 81511 FTL CGEN ABNOR FOUR ANAL: CPT | Mod: 90 | Performed by: PHYSICIAN ASSISTANT

## 2017-08-17 PROCEDURE — 99207 ZZC PRENATAL VISIT: CPT | Performed by: PHYSICIAN ASSISTANT

## 2017-08-17 ASSESSMENT — PAIN SCALES - GENERAL: PAINLEVEL: NO PAIN (0)

## 2017-08-17 NOTE — MR AVS SNAPSHOT
After Visit Summary   8/17/2017    Wyatt Bruno    MRN: [id number]           Patient Information     Date Of Birth          1990        Visit Information        Provider Department      8/17/2017 5:15 PM Amber Crockett PA-C [facility]        Today's Diagnoses     Supervision of other normal pregnancy, antepartum    -  1       Follow-ups after your visit        Your next 10 appointments already scheduled     Sep 14, 2017  4:30 PM CDT   US OB > 14 WEEKS COMPLETE SINGLE with PHUS1   [facility] ([facility])    [address] [place] [address]   [phone]           Please bring a list of your medicines (including vitamins, minerals and over-the-counter drugs). Also, tell your doctor about any allergies you may have. Wear comfortable clothes and leave your valuables at home.  If you re less than 20 weeks drink four 8-ounce glasses of fluid an hour before your exam. If you need to empty your bladder before your exam, try to release only a little urine. Then, drink another glass of fluid.  You may have up to two family members in the exam room. If you bring a small child, an adult must be there to care for him or her.  Please call the Imaging Department at your exam site with any questions.            Sep 14, 2017  6:00 PM CDT   ESTABLISHED PRENATAL with Amber Crockett PA-C   [facility] ([facility])    [address] [place] [address]   [phone]            Oct 12, 2017  6:00 PM CDT   ESTABLISHED PRENATAL with Amber Crockett PA-C   [facility] ([facility])    [address] [place] [address]   [phone]            Nov 09, 2017  6:00 PM CST   ESTABLISHED PRENATAL with Amber Crockett PA-C   [facility] ([facility])    [address] [place] [address]   [phone]            Nov 22, 2017   4:30 PM CST   ESTABLISHED PRENATAL with Amber Crockett PA-C   [facility] ([facility])    [address] [place] [address]   [phone]            Dec 07, 2017  6:00 PM CST   ESTABLISHED PRENATAL with Amber Crockett PA-C   [facility] ([facility])    [address] [place] [address]   [phone]            Dec 21, 2017  6:00 PM CST   ESTABLISHED PRENATAL with Amber Crockett PA-C   [facility] ([facility])    [address] [place] [address]   [phone]            Jan 04, 2018  6:00 PM CST   ESTABLISHED PRENATAL with Amber Crockett PA-C   [facility] ([facility])    [address] [place] [address]   [phone]            Jan 11, 2018  6:00 PM CST   ESTABLISHED PRENATAL with Amber Crockett PA-C   [facility] ([facility])    [address] [place] [address]   [phone]            Jan 18, 2018  6:00 PM CST   ESTABLISHED PRENATAL with Amber Crockett PA-C   [facility] ([facility])    [address] [place] [address]   [phone]              Who to contact     If you have questions or need follow up information about today's clinic visit or your schedule please contact [facility] directly at [phone].  Normal or non-critical lab and imaging results will be communicated to you by MyChart, letter or phone within 4 business days after the clinic has received the results. If you do not hear from us within 7 days, please contact the clinic through MyChart or phone. If you have a critical or abnormal lab result, we will notify you by phone as soon as possible.  Submit refill requests through Appetise or call your pharmacy and they will forward the refill request to us. Please allow 3 business days for your refill to  be completed.          Additional Information About Your Visit        A[employer]na [employer] Systemshart Information     [employer] gives you secure access to your electronic health record. If you see a primary care provider, you can also send messages to your care team and make appointments. If you have questions, please call your primary care clinic.  If you do not have a primary care provider, please call [phone] and they will assist you.        Care EveryWhere ID     This is your Care EveryWhere ID. This could be used by other organizations to access your [place] medical records  [id number]        Your Vitals Were     Pulse Temperature Respirations Last Period Pulse Oximetry BMI (Body Mass Index)    68 98.8  F (37.1  C) (Tympanic) 18 04/28/2017 (Exact Date) 98% 32.42 kg/m2       Blood Pressure from Last 3 Encounters:   08/17/17 116/70   07/20/17 116/80   05/24/17 110/78    Weight from Last 3 Encounters:   08/17/17 196 lb (88.9 kg)   07/20/17 193 lb (87.5 kg)   05/24/17 195 lb 8 oz (88.7 kg)              We Performed the Following     Maternal quad screen        Primary Care Provider Office Phone # Fax #    Amber Crockett PA-C [phone]       [address] [place] [address]        Equal Access to Services     LUCRECIA ZAFAR : Hadii laurence ku hadasho Soomaali, waaxda luqadaha, qaybta kaalmada adeegyada, waxay idiin hayheavenn ale west. So [facility] [phone].    ATENCIÓN: Si habla español, tiene a grimaldo disposición servicios gratuitos de asistencia lingüística. Llame al [phone].    We comply with applicable federal civil rights laws and [place] laws. We do not discriminate on the basis of race, color, national origin, age, disability sex, sexual orientation or gender identity.            Thank you!     Thank you for choosing [facility]  for your care. Our goal is always to provide you with excellent care. Hearing back from our patients is one way we can continue to improve our services.  Please take a few minutes to complete the written survey that you may receive in the mail after your visit with us. Thank you!             Your Updated Medication List - Protect others around you: Learn how to safely use, store and throw away your medicines at www.disposemymeds.org.          This list is accurate as of: 8/17/17  6:45 PM.  Always use your most recent med list.                   Brand Name Dispense Instructions for use Diagnosis    prenatal multivitamin plus iron 27-0.8 MG Tabs per tablet      Take 1 tablet by mouth daily

## 2017-08-17 NOTE — NURSING NOTE
"Chief Complaint   Patient presents with     Prenatal Care       Initial /70  Pulse 68  Temp 98.8  F (37.1  C) (Tympanic)  Resp 18  Wt 196 lb (88.9 kg)  LMP 04/28/2017 (Exact Date)  SpO2 98%  BMI 32.42 kg/m2 Estimated body mass index is 32.42 kg/(m^2) as calculated from the following:    Height as of 2/17/17: 5' 5.2\" (1.656 m).    Weight as of this encounter: 196 lb (88.9 kg).  BP completed using cuff size: eduar Ferrer MA      "

## 2017-08-17 NOTE — PROGRESS NOTES
Doing well.  No concerns today. Taking PNV.  Discussed quad screening. Prefer to do this test prior to 20 week ultrasound.  Patient wishes to proceed. If quad study returns abnormal will do Level II ultrasound at [facility].   20 week US for full fetal anatomical survey ordered today.  RTC in 4 weeks    Electronically signed by Amber Crockett PA-C  8/17/2017

## 2017-08-21 LAB
# FETUSES US: NORMAL
AFP ADJ MOM AMN: 0.73
AFP SERPL-MCNC: 19 NG/ML
AGE - REPORTED: 29.9 YR
DATING METHOD: NORMAL
DIABETIC AT CONCEPTION: NO
FAMILY MEMBER DISEASES HX: NO
FAMILY MEMBER DISEASES HX: NO
GA METHOD: NORMAL
GA: 15.86 WEEKS
HCG MOM SERPL: 1.05
HCG SERPL-ACNC: NORMAL IU/L
HX OF HEREDITARY DISORDERS: NO
IDDM PATIENT QL: NO
INHIBIN A MOM SERPL: 0.74
INHIBIN A SERPL-MCNC: 114 PG/ML
INTEGRATED SCN PATIENT-IMP: NORMAL
LMP START DATE: NORMAL
PATHOLOGY STUDY: NORMAL
PREV HX CHROMOSOME ABNORMALITY: NO
SPECIMEN DRAWN SERPL: NORMAL
TWINS: NO
U ESTRIOL MOM SERPL: 1.43
U ESTRIOL SERPL-MCNC: 1.1 NG/ML

## 2017-09-14 ENCOUNTER — HOSPITAL ENCOUNTER (OUTPATIENT)
Dept: ULTRASOUND IMAGING | Facility: CLINIC | Age: 27
Discharge: HOME | End: 2017-09-14
Attending: PHYSICIAN ASSISTANT | Admitting: PHYSICIAN ASSISTANT
Payer: COMMERCIAL

## 2017-09-14 ENCOUNTER — PRENATAL OFFICE VISIT (OUTPATIENT)
Dept: FAMILY MEDICINE | Facility: CLINIC | Age: 27
End: 2017-09-14
Payer: COMMERCIAL

## 2017-09-14 VITALS
OXYGEN SATURATION: 99 % | TEMPERATURE: 98.2 F | SYSTOLIC BLOOD PRESSURE: 120 MMHG | BODY MASS INDEX: 33.24 KG/M2 | HEART RATE: 80 BPM | RESPIRATION RATE: 18 BRPM | WEIGHT: 201 LBS | DIASTOLIC BLOOD PRESSURE: 72 MMHG

## 2017-09-14 DIAGNOSIS — Z34.80 SUPERVISION OF OTHER NORMAL PREGNANCY, ANTEPARTUM: Primary | ICD-10-CM

## 2017-09-14 DIAGNOSIS — Z23 NEED FOR PROPHYLACTIC VACCINATION AND INOCULATION AGAINST INFLUENZA: ICD-10-CM

## 2017-09-14 DIAGNOSIS — Z34.81 ENCOUNTER FOR SUPERVISION OF OTHER NORMAL PREGNANCY IN FIRST TRIMESTER: ICD-10-CM

## 2017-09-14 PROCEDURE — 76805 OB US >/= 14 WKS SNGL FETUS: CPT

## 2017-09-14 PROCEDURE — 90471 IMMUNIZATION ADMIN: CPT | Performed by: PHYSICIAN ASSISTANT

## 2017-09-14 PROCEDURE — 90686 IIV4 VACC NO PRSV 0.5 ML IM: CPT | Performed by: PHYSICIAN ASSISTANT

## 2017-09-14 PROCEDURE — 99207 ZZC PRENATAL VISIT: CPT | Performed by: PHYSICIAN ASSISTANT

## 2017-09-14 ASSESSMENT — PAIN SCALES - GENERAL: PAINLEVEL: NO PAIN (0)

## 2017-09-14 NOTE — PROGRESS NOTES
Injectable Influenza Immunization Documentation    1.  Are you sick today? (Fever of 100.5 or higher on the day of the clinic)   No    2.  Have you ever had Guillain-[place] Syndrome within 6 weeks of an influenza vaccionation?  No    3. Do you have a life-threatening allergy to eggs?  No    4. Do you have a life-threatening allergy to a component of the vaccine? May include antibiotics, gelatin or latex.  No     5. Have you ever had a reaction to a dose of flu vaccine that needed immediate medical attention?  No     Form completed by Darlene Ferrer MA

## 2017-09-14 NOTE — PROGRESS NOTES
Doing well.  No concerns today. Taking PNV.  Flu shot given today.  20 week US for full fetal anatomical survey was done today - will contact her with results as soon as radiology has reviewed.   RTC in 4 weeks    Electronically signed by Amber Crockett PA-C  9/14/2017

## 2017-09-14 NOTE — MR AVS SNAPSHOT
After Visit Summary   9/14/2017    Wyatt Bruno    MRN: [id number]           Patient Information     Date Of Birth          1990        Visit Information        Provider Department      9/14/2017 6:00 PM Amber Crockett PA-C [facility]        Today's Diagnoses     Supervision of other normal pregnancy, antepartum    -  1       Follow-ups after your visit        Your next 10 appointments already scheduled     Oct 12, 2017  6:00 PM CDT   ESTABLISHED PRENATAL with Amber Crockett PA-C   [facility]    [address] [place] [address]   [phone]            Nov 09, 2017  6:00 PM CST   ESTABLISHED PRENATAL with Amber Crockett PA-C   [facility]    [address] [place] [address]   [phone]            Nov 22, 2017  4:30 PM CST   ESTABLISHED PRENATAL with Amber Crockett PA-C   [facility]    [address] [place] [address]   [phone]            Dec 07, 2017  6:00 PM CST   ESTABLISHED PRENATAL with Amber Crockett PA-C   [facility]    [address] [place] [address]   [phone]            Dec 21, 2017  6:00 PM CST   ESTABLISHED PRENATAL with Amber Crockett PA-C   [facility]    [address] [place] [address]   [phone]            Jan 04, 2018  6:00 PM CST   ESTABLISHED PRENATAL with Amber Crockett PA-C   [facility]    [address] [place] [address]   [phone]            Jan 11, 2018  6:00 PM CST   ESTABLISHED PRENATAL with Amber Crockett PA-C   [facility]    [address] [place] [address]   [phone]            Jan 18, 2018  6:00 PM CST    ESTABLISHED PRENATAL with Amber Crockett PA-C   [facility]    [address] [place] [address]   [phone]            Jan 25, 2018  6:00 PM CST   ESTABLISHED PRENATAL with Amber Crockett PA-C   [facility]    [address] [place] [address]   [phone]            Feb 01, 2018  6:00 PM CST   ESTABLISHED PRENATAL with Amber Crockett PA-C   [facility]    [address] [place] [id number] [phone]              Who to contact     If you have questions or need follow up information about today's clinic visit or your schedule please contact [facility] directly at [phone].  Normal or non-critical lab and imaging results will be communicated to you by MyChart, letter or phone within 4 business days after the clinic has received the results. If you do not hear from us within 7 days, please contact the clinic through Storyfulhart or phone. If you have a critical or abnormal lab result, we will notify you by phone as soon as possible.  Submit refill requests through Cal[employer] or call your pharmacy and they will forward the refill request to us. Please allow 3 business days for your refill to be completed.          Additional Information About Your Visit        MyChart Information     [employer] gives you secure access to your electronic health record. If you see a primary care provider, you can also send messages to your care team and make appointments. If you have questions, please call your primary care clinic.  If you do not have a primary care provider, please call [phone] and they will assist you.        Care EveryWhere ID     This is your Care EveryWhere ID. This could be used by other organizations to access your [place] medical records  [id number]        Your Vitals Were     Pulse Temperature Respirations  Last Period Pulse Oximetry BMI (Body Mass Index)    80 98.2  F (36.8  C) (Tympanic) 18 04/28/2017 (Exact Date) 99% 33.24 kg/m2       Blood Pressure from Last 3 Encounters:   09/14/17 120/72   08/17/17 116/70   07/20/17 116/80    Weight from Last 3 Encounters:   09/14/17 201 lb (91.2 kg)   08/17/17 196 lb (88.9 kg)   07/20/17 193 lb (87.5 kg)              Today, you had the following     No orders found for display       Primary Care Provider Office Phone # Fax #    Amber JERRY Crockett PA-C [phone] [address] [place] [address]        Equal Access to Services     LUCRECIA ZAFAR : Lennie deleono Soomaali, waaxda luqadaha, qaybta kaalmada adeegyada, octaviano giles . So [facility] [phone].    ATENCIÓN: Si habla español, tiene a grimaldo disposición servicios gratuitos de asistencia lingüística. Llame al [phone].    We comply with applicable federal civil rights laws and [place] laws. We do not discriminate on the basis of race, color, national origin, age, disability sex, sexual orientation or gender identity.            Thank you!     Thank you for choosing [facility]  for your care. Our goal is always to provide you with excellent care. Hearing back from our patients is one way we can continue to improve our services. Please take a few minutes to complete the written survey that you may receive in the mail after your visit with us. Thank you!             Your Updated Medication List - Protect others around you: Learn how to safely use, store and throw away your medicines at www.disposemymeds.org.          This list is accurate as of: 9/14/17  6:19 PM.  Always use your most recent med list.                   Brand Name Dispense Instructions for use Diagnosis    prenatal multivitamin plus iron 27-0.8 MG Tabs per tablet      Take 1 tablet by mouth daily

## 2017-09-14 NOTE — Clinical Note
Problems seeing certain anatomy on US today - please repeat limited US on T or Th next week late as possible - mychart patient with appt please

## 2017-09-14 NOTE — NURSING NOTE
"Chief Complaint   Patient presents with     Prenatal Care       Initial /72  Pulse 80  Temp 98.2  F (36.8  C) (Tympanic)  Resp 18  Wt 201 lb (91.2 kg)  LMP 04/28/2017 (Exact Date)  SpO2 99%  BMI 33.24 kg/m2 Estimated body mass index is 33.24 kg/(m^2) as calculated from the following:    Height as of 2/17/17: 5' 5.2\" (1.656 m).    Weight as of this encounter: 201 lb (91.2 kg).  BP completed using cuff size: eduar Ferrer MA      "

## 2017-09-14 NOTE — NURSING NOTE
Prior to injection verified patient identity using patient's name and date of birth.  Per orders of Amber Crockett  injection of Flu given by Darlene Ferrer MA. Patient instructed to remain in clinic for 20 minutes afterwards and to report any adverse reaction to me immediately.

## 2017-09-15 ENCOUNTER — MYC MEDICAL ADVICE (OUTPATIENT)
Dept: FAMILY MEDICINE | Facility: CLINIC | Age: 27
End: 2017-09-15

## 2017-09-18 ENCOUNTER — TELEPHONE (OUTPATIENT)
Dept: FAMILY MEDICINE | Facility: CLINIC | Age: 27
End: 2017-09-18

## 2017-09-18 DIAGNOSIS — Z34.80 SUPERVISION OF OTHER NORMAL PREGNANCY, ANTEPARTUM: Primary | ICD-10-CM

## 2017-09-18 NOTE — PROGRESS NOTES
Wyatt - we are still having trouble seeing the spine well. I do think this is important let's repeat a limited ultrasound in the next 1-2 weeks. I will have Lupe connect with you on getting this done.

## 2017-09-18 NOTE — TELEPHONE ENCOUNTER
----- Message from Amber Crockett PA-C sent at 9/18/2017  9:05 AM CDT -----  [place] - we are still having trouble seeing the spine well. I do think this is important let's repeat a limited ultrasound in the next 1-2 weeks. I will have Lupe connect with you on getting this done.

## 2017-09-21 ENCOUNTER — HOSPITAL ENCOUNTER (OUTPATIENT)
Dept: ULTRASOUND IMAGING | Facility: CLINIC | Age: 27
Discharge: HOME | End: 2017-09-21
Attending: PHYSICIAN ASSISTANT | Admitting: PHYSICIAN ASSISTANT
Payer: COMMERCIAL

## 2017-09-21 DIAGNOSIS — Z34.80 SUPERVISION OF OTHER NORMAL PREGNANCY, ANTEPARTUM: ICD-10-CM

## 2017-09-21 PROCEDURE — 76816 OB US FOLLOW-UP PER FETUS: CPT

## 2017-10-12 ENCOUNTER — PRENATAL OFFICE VISIT (OUTPATIENT)
Dept: FAMILY MEDICINE | Facility: CLINIC | Age: 27
End: 2017-10-12
Payer: COMMERCIAL

## 2017-10-12 VITALS
WEIGHT: 209 LBS | RESPIRATION RATE: 16 BRPM | TEMPERATURE: 98.6 F | DIASTOLIC BLOOD PRESSURE: 70 MMHG | OXYGEN SATURATION: 98 % | HEART RATE: 88 BPM | BODY MASS INDEX: 34.57 KG/M2 | SYSTOLIC BLOOD PRESSURE: 116 MMHG

## 2017-10-12 DIAGNOSIS — Z34.80 SUPERVISION OF OTHER NORMAL PREGNANCY, ANTEPARTUM: Primary | ICD-10-CM

## 2017-10-12 PROCEDURE — 99207 ZZC PRENATAL VISIT: CPT | Performed by: PHYSICIAN ASSISTANT

## 2017-10-12 ASSESSMENT — PAIN SCALES - GENERAL: PAINLEVEL: NO PAIN (0)

## 2017-10-12 NOTE — NURSING NOTE
"Chief Complaint   Patient presents with     Prenatal Care       Initial /70  Pulse 88  Temp 98.6  F (37  C) (Tympanic)  Resp 16  Wt 209 lb (94.8 kg)  LMP 04/28/2017 (Exact Date)  SpO2 98%  BMI 34.57 kg/m2 Estimated body mass index is 34.57 kg/(m^2) as calculated from the following:    Height as of 2/17/17: 5' 5.2\" (1.656 m).    Weight as of this encounter: 209 lb (94.8 kg).  BP completed using cuff size: eduar Ferrer MA      "

## 2017-10-12 NOTE — PROGRESS NOTES
Doing well.  No concerns today. Taking PNV.   Reviewed recent US-no concerns with anatomical survey. Placental placement, fluid levels, and fetal anatomy all returned normal.  Discussed opportunity to meet with OB coordinator surrounding education inclusive of third trimester of pregnancy.  Also encouraged birthing & lactation classes  offered at [facility].  Reminded of upcoming labs including 1 hour GTT, antitreponema and hemoglobin. These labs are typically done between 24-28 weeks gestation.  Also discussed upcoming Tdap recommendation - would like to do this immunization between 27-34 weeks for baby's immune system to have protection against pertussis.  RTC in 4 weeks    Electronically signed by Amber Crockett PA-C  10/12/2017

## 2017-10-12 NOTE — MR AVS SNAPSHOT
After Visit Summary   10/12/2017    Wyatt Bruno    MRN: [id number]           Patient Information     Date Of Birth          1990        Visit Information        Provider Department      10/12/2017 6:00 PM Amber Crockett PA-C [facility]        Today's Diagnoses     Supervision of other normal pregnancy, antepartum    -  1       Follow-ups after your visit        Your next 10 appointments already scheduled     Nov 09, 2017  6:00 PM CST   ESTABLISHED PRENATAL with Amber Crockett PA-C   [facility]    [address] [place] [address]   [phone]            Nov 22, 2017  4:30 PM CST   ESTABLISHED PRENATAL with BRIEN Law-C   [facility]    [address] [place] [address]   [phone]            Dec 07, 2017  6:00 PM CST   ESTABLISHED PRENATAL with Amber Crockett PA-C   [facility]    [address] [place] [address]   [phone]            Dec 21, 2017  6:00 PM CST   ESTABLISHED PRENATAL with LACEY LawC   [facility]    [address] [place] [address]   [phone]            Jan 04, 2018  6:00 PM CST   ESTABLISHED PRENATAL with LACEY LawC   [facility]    [address] [place] [address]   [phone]            Jan 11, 2018  6:00 PM CST   ESTABLISHED PRENATAL with LACEY LawC   [facility]    [address] [place] [address]   [phone]            Jan 18, 2018  6:00 PM CST   ESTABLISHED PRENATAL with BRIEN Law-C   [facility]    [address] [place] [address]   [phone]            Jan 25, 2018  6:00 PM  CST   ESTABLISHED PRENATAL with Amber Crockett PA-C   [facility]    [address] [place] [address]   [phone]            Feb 01, 2018  6:00 PM CST   ESTABLISHED PRENATAL with Amber Crockett PA-C   [facility]    [address] [place] [address] [phone]            Feb 08, 2018  6:00 PM CST   ESTABLISHED PRENATAL with Amber Crockett PA-C   [facility]    [address] [place] [address] [phone]              Who to contact     If you have questions or need follow up information about today's clinic visit or your schedule please contact [facility] directly at [phone].  Normal or non-critical lab and imaging results will be communicated to you by MyChart, letter or phone within 4 business days after the clinic has received the results. If you do not hear from us within 7 days, please contact the clinic through CastTVhart or phone. If you have a critical or abnormal lab result, we will notify you by phone as soon as possible.  Submit refill requests through [employer] or call your pharmacy and they will forward the refill request to us. Please allow 3 business days for your refill to be completed.          Additional Information About Your Visit        MyChart Information     [employer] gives you secure access to your electronic health record. If you see a primary care provider, you can also send messages to your care team and make appointments. If you have questions, please call your primary care clinic.  If you do not have a primary care provider, please call [phone] and they will assist you.        Care EveryWhere ID     This is your Care EveryWhere ID. This could be used by other organizations to access your [place] medical records  [id number]        Your Vitals Were     Pulse Temperature Respirations  Last Period Pulse Oximetry BMI (Body Mass Index)    88 98.6  F (37  C) (Tympanic) 16 04/28/2017 (Exact Date) 98% 34.57 kg/m2       Blood Pressure from Last 3 Encounters:   10/12/17 116/70   09/14/17 120/72   08/17/17 116/70    Weight from Last 3 Encounters:   10/12/17 209 lb (94.8 kg)   09/14/17 201 lb (91.2 kg)   08/17/17 196 lb (88.9 kg)              Today, you had the following     No orders found for display       Primary Care Provider Office Phone # Fax #    Amber Crockett PA-C [phone] [address] [place] [address]        Equal Access to Services     LUCRECIA ZAFAR : Lennie deleono Soomaali, waaxda luqadaha, qaybta kaalmada adeegyada, octaviano giles . So [facility] [phone].    ATENCIÓN: Si habla español, tiene a grimaldo disposición servicios gratuitos de asistencia lingüística. Llame al [phone].    We comply with applicable federal civil rights laws and [place] laws. We do not discriminate on the basis of race, color, national origin, age, disability, sex, sexual orientation, or gender identity.            Thank you!     Thank you for choosing [facility]  for your care. Our goal is always to provide you with excellent care. Hearing back from our patients is one way we can continue to improve our services. Please take a few minutes to complete the written survey that you may receive in the mail after your visit with us. Thank you!             Your Updated Medication List - Protect others around you: Learn how to safely use, store and throw away your medicines at www.disposemymeds.org.          This list is accurate as of: 10/12/17  6:05 PM.  Always use your most recent med list.                   Brand Name Dispense Instructions for use Diagnosis    prenatal multivitamin plus iron 27-0.8 MG Tabs per tablet      Take 1 tablet by mouth daily

## 2017-11-09 ENCOUNTER — PRENATAL OFFICE VISIT (OUTPATIENT)
Dept: FAMILY MEDICINE | Facility: CLINIC | Age: 27
End: 2017-11-09
Payer: COMMERCIAL

## 2017-11-09 VITALS
HEART RATE: 80 BPM | DIASTOLIC BLOOD PRESSURE: 70 MMHG | OXYGEN SATURATION: 98 % | RESPIRATION RATE: 18 BRPM | TEMPERATURE: 97.7 F | BODY MASS INDEX: 35.39 KG/M2 | SYSTOLIC BLOOD PRESSURE: 116 MMHG | WEIGHT: 214 LBS

## 2017-11-09 DIAGNOSIS — Z34.81 ENCOUNTER FOR SUPERVISION OF OTHER NORMAL PREGNANCY IN FIRST TRIMESTER: Primary | ICD-10-CM

## 2017-11-09 DIAGNOSIS — R30.0 DYSURIA: ICD-10-CM

## 2017-11-09 DIAGNOSIS — Z23 NEED FOR VACCINATION: ICD-10-CM

## 2017-11-09 LAB
ALBUMIN UR-MCNC: NEGATIVE MG/DL
APPEARANCE UR: ABNORMAL
BACTERIA #/AREA URNS HPF: ABNORMAL /HPF
BILIRUB UR QL STRIP: NEGATIVE
COLOR UR AUTO: YELLOW
GLUCOSE 1H P 50 G GLC PO SERPL-MCNC: 70 MG/DL (ref 60–129)
GLUCOSE UR STRIP-MCNC: NEGATIVE MG/DL
HGB BLD-MCNC: 11.1 G/DL (ref 11.7–15.7)
HGB UR QL STRIP: NEGATIVE
KETONES UR STRIP-MCNC: NEGATIVE MG/DL
LEUKOCYTE ESTERASE UR QL STRIP: NEGATIVE
MUCOUS THREADS #/AREA URNS LPF: PRESENT /LPF
NITRATE UR QL: NEGATIVE
PH UR STRIP: 6 PH (ref 5–7)
RBC #/AREA URNS AUTO: <1 /HPF (ref 0–2)
SOURCE: ABNORMAL
SP GR UR STRIP: 1.03 (ref 1–1.03)
SQUAMOUS #/AREA URNS AUTO: 1 /HPF (ref 0–1)
UROBILINOGEN UR STRIP-MCNC: 0 MG/DL (ref 0–2)
WBC #/AREA URNS AUTO: 1 /HPF (ref 0–2)

## 2017-11-09 PROCEDURE — 00000218 ZZHCL STATISTIC OBHBG - HEMOGLOBIN: Performed by: PHYSICIAN ASSISTANT

## 2017-11-09 PROCEDURE — 90715 TDAP VACCINE 7 YRS/> IM: CPT | Performed by: PHYSICIAN ASSISTANT

## 2017-11-09 PROCEDURE — 82950 GLUCOSE TEST: CPT | Performed by: PHYSICIAN ASSISTANT

## 2017-11-09 PROCEDURE — 86780 TREPONEMA PALLIDUM: CPT | Performed by: PHYSICIAN ASSISTANT

## 2017-11-09 PROCEDURE — 36415 COLL VENOUS BLD VENIPUNCTURE: CPT | Performed by: PHYSICIAN ASSISTANT

## 2017-11-09 PROCEDURE — 90471 IMMUNIZATION ADMIN: CPT | Performed by: PHYSICIAN ASSISTANT

## 2017-11-09 PROCEDURE — 81001 URINALYSIS AUTO W/SCOPE: CPT | Performed by: PHYSICIAN ASSISTANT

## 2017-11-09 PROCEDURE — 87086 URINE CULTURE/COLONY COUNT: CPT | Performed by: PHYSICIAN ASSISTANT

## 2017-11-09 PROCEDURE — 99207 ZZC PRENATAL VISIT: CPT | Mod: 25 | Performed by: PHYSICIAN ASSISTANT

## 2017-11-09 ASSESSMENT — PAIN SCALES - GENERAL: PAINLEVEL: NO PAIN (0)

## 2017-11-09 NOTE — MR AVS SNAPSHOT
After Visit Summary   11/9/2017    Wyatt Bruno    MRN: [id number]           Patient Information     Date Of Birth          1990        Visit Information        Provider Department      11/9/2017 6:00 PM Amber Crockett PA-C [facility]        Today's Diagnoses     Encounter for supervision of other normal pregnancy in first trimester    -  1    Dysuria        Need for vaccination           Follow-ups after your visit        Your next 10 appointments already scheduled     Nov 22, 2017  4:30 PM CST   ESTABLISHED PRENATAL with Amber Crockett PA-C   [facility] ([facility])    [address] [place] [address]   [phone]            Dec 07, 2017  6:00 PM CST   ESTABLISHED PRENATAL with Amber Crockett PA-C   [facility] ([facility])    [address] [place] [address]   [phone]            Dec 21, 2017  6:00 PM CST   ESTABLISHED PRENATAL with Amber Crockett PA-C   [facility] ([facility])    [address] [place] [address]   [phone]            Jan 04, 2018  6:00 PM CST   ESTABLISHED PRENATAL with Amber Crockett PA-C   [facility] ([facility])    [address] [place] [address]   [phone]            Jan 11, 2018  6:00 PM CST   ESTABLISHED PRENATAL with Amber Crockett PA-C   [facility] ([facility])    [address] [place] [address]   [phone]            Jan 18, 2018  6:00 PM CST   ESTABLISHED PRENATAL with Amber Crockett PA-C   [facility] ([facility])    [address] [place] [address]   [phone]            Jan 25, 2018  6:00 PM CST   ESTABLISHED PRENATAL with Amber Crockett PA-C   [facility] ([facility])    [address] [place] [address]  [address]   [phone]            Feb 01, 2018  6:00 PM CST   ESTABLISHED PRENATAL with Amber Crockett PA-C   [facility]    [address] [place] [id number] [phone]            Feb 08, 2018  6:00 PM CST   ESTABLISHED PRENATAL with Amber Crockett PA-C   [facility] ([address] [id number] [phone]              Who to contact     If you have questions or need follow up information about today's clinic visit or your schedule please contact [facility] directly at [phone].  Normal or non-critical lab and imaging results will be communicated to you by infirst Healthcarehart, letter or phone within 4 business days after the clinic has received the results. If you do not hear from us within 7 days, please contact the clinic through infirst Healthcarehart or phone. If you have a critical or abnormal lab result, we will notify you by phone as soon as possible.  Submit refill requests through Bitvore or call your pharmacy and they will forward the refill request to us. Please allow 3 business days for your refill to be completed.          Additional Information About Your Visit        Bitvore Information     Bitvore gives you secure access to your electronic health record. If you see a primary care provider, you can also send messages to your care team and make appointments. If you have questions, please call your primary care clinic.  If you do not have a primary care provider, please call [phone] and they will assist you.        Care EveryWhere ID     This is your Care EveryWhere ID. This could be used by other organizations to access your [place] medical records  [id number]        Your Vitals Were     Pulse Temperature Respirations Last Period Pulse Oximetry BMI (Body Mass Index)    80 97.7  F (36.5  C) (Tympanic) 18 04/28/2017 (Exact Date) 98% 35.39 kg/m2       Blood Pressure  from Last 3 Encounters:   11/09/17 116/70   10/12/17 116/70   09/14/17 120/72    Weight from Last 3 Encounters:   11/09/17 214 lb (97.1 kg)   10/12/17 209 lb (94.8 kg)   09/14/17 201 lb (91.2 kg)              We Performed the Following     *UA reflex to Microscopic and Culture ([place]; [facility]; [facility]; [facility]; [facility]; [facility]; [facility]; [place])     1st  Administration  [78911]     Anti Treponema     Glucose tolerance, gest screen, 1 hour     OB hemoglobin     TDAP VACCINE ([facility]) [12193.002]     Urine Culture Aerobic Bacterial        Primary Care Provider Office Phone # Fax #    Amber Crockett PA-C [phone] [address] [place] [address]        Equal Access to Services     [facility] Hospital[facility]ILVIANO : Hadii aad ku hadasho Soomaali, waaxda luqadaha, qaybta kaalmada adeegyada, waxay idiin hayaan ale giles . So [facility] [phone].    ATENCIÓN: Si habla español, tiene a grimaldo disposición servicios gratuitos de asistencia lingüística. Llame al [phone].    We comply with applicable federal civil rights laws and [place] laws. We do not discriminate on the basis of race, color, national origin, age, disability, sex, sexual orientation, or gender identity.            Thank you!     Thank you for choosing [facility]  for your care. Our goal is always to provide you with excellent care. Hearing back from our patients is one way we can continue to improve our services. Please take a few minutes to complete the written survey that you may receive in the mail after your visit with us. Thank you!             Your Updated Medication List - Protect others around you: Learn how to safely use, store and throw away your medicines at www.disposemymeds.org.          This list is accurate as of: 11/9/17  7:04 PM.  Always use your most recent med list.                   Brand Name Dispense Instructions for use Diagnosis    prenatal multivitamin plus  iron 27-0.8 MG Tabs per tablet      Take 1 tablet by mouth daily

## 2017-11-10 LAB
BACTERIA SPEC CULT: NORMAL
Lab: NORMAL
SPECIMEN SOURCE: NORMAL
T PALLIDUM IGG+IGM SER QL: NEGATIVE

## 2017-11-10 NOTE — PROGRESS NOTES
[place] - your urine shows no sign of infection. Your hemoglobin is within normal limits for pregnancy we will recheck this again at 36 weeks and if it is getting in the lower 10 range will need to supplement with iron.

## 2017-11-10 NOTE — PROGRESS NOTES
Doing well.  No concerns today other than ? UTI - requesting UA. This returned negative. Taking PNV.   OB labs including 1hr GTT, Anti Treponema and Hemoglobin will be done today.  Prenatal flowsheet information is reviewed.  Discussed kick counts and fetal movement.  Discussed opportunity to meet with OB coordinator surrounding education inclusive of third trimester of pregnancy.  Also encouraged birthing & lactation classes  offered at [facility].  I will contact her with lab results through Bloxy if active or by phone call.      RTC in 2 weeks    Electronically signed by Amber Crockett PA-C  11/9/2017

## 2017-11-10 NOTE — NURSING NOTE
Prior to injection verified patient identity using patient's name and date of birth.  Per orders of Amber Crockett  injection of Tdap  given by Darlene Ferrer MA. Patient instructed to remain in clinic for 20 minutes afterwards and to report any adverse reaction to me immediately.         Screening Questionnaire for Adult Immunization    Are you sick today?   No   Do you have allergies to medications, food, a vaccine component or latex?   No   Have you ever had a serious reaction after receiving a vaccination?   No   Do you have a long-term health problem with heart disease, lung disease, asthma, kidney disease, metabolic disease (e.g. diabetes), anemia, or other blood disorder?   No   Do you have cancer, leukemia, HIV/AIDS, or any other immune system problem?   No   In the past 3 months, have you taken medications that affect  your immune system, such as prednisone, other steroids, or anticancer drugs; drugs for the treatment of rheumatoid arthritis, Crohn s disease, or psoriasis; or have you had radiation treatments?   No   Have you had a seizure, or a brain or other nervous system problem?   No   During the past year, have you received a transfusion of blood or blood     products, or been given immune (gamma) globulin or antiviral drug?   No   For women: Are you pregnant or is there a chance you could become        pregnant during the next month?   No   Have you received any vaccinations in the past 4 weeks?   No     Immunization questionnaire answers were all negative.           Screening performed by Melonie Ferrer on 11/9/2017 at 6:38 PM.

## 2017-11-10 NOTE — NURSING NOTE
"Chief Complaint   Patient presents with     Prenatal Care       Initial /70  Pulse 80  Temp 97.7  F (36.5  C) (Tympanic)  Resp 18  Wt 214 lb (97.1 kg)  LMP 04/28/2017 (Exact Date)  SpO2 98%  BMI 35.39 kg/m2 Estimated body mass index is 35.39 kg/(m^2) as calculated from the following:    Height as of 2/17/17: 5' 5.2\" (1.656 m).    Weight as of this encounter: 214 lb (97.1 kg).  BP completed using cuff size: eduar Ferrer MA      "

## 2017-11-22 ENCOUNTER — PRENATAL OFFICE VISIT (OUTPATIENT)
Dept: FAMILY MEDICINE | Facility: CLINIC | Age: 27
End: 2017-11-22
Payer: COMMERCIAL

## 2017-11-22 VITALS
TEMPERATURE: 98.2 F | OXYGEN SATURATION: 100 % | BODY MASS INDEX: 35.89 KG/M2 | RESPIRATION RATE: 18 BRPM | DIASTOLIC BLOOD PRESSURE: 70 MMHG | HEART RATE: 71 BPM | SYSTOLIC BLOOD PRESSURE: 110 MMHG | WEIGHT: 217 LBS

## 2017-11-22 DIAGNOSIS — Z34.80 SUPERVISION OF OTHER NORMAL PREGNANCY, ANTEPARTUM: Primary | ICD-10-CM

## 2017-11-22 PROCEDURE — 99207 ZZC PRENATAL VISIT: CPT | Performed by: PHYSICIAN ASSISTANT

## 2017-11-22 ASSESSMENT — PAIN SCALES - GENERAL: PAINLEVEL: NO PAIN (0)

## 2017-11-22 NOTE — PROGRESS NOTES
Doing well.  No concerns today.  No vaginal bleeding, leakage, or contractions.  Baby's movement is active and frequent.  Taking PNV.  Discussed kick counts and fetal movement.  Signs/symptoms of hypertension and pre-eclampsia reviewed with patient today. She should contact the hospital OB dept if any new concerning symptoms.  Reminded of upcoming GBS swab.      RTC in 2 weeks.     Electronically signed by Amber Crockett PA-C  11/22/2017

## 2017-11-22 NOTE — MR AVS SNAPSHOT
After Visit Summary   11/22/2017    Wyatt Bruno    MRN: [id number]           Patient Information     Date Of Birth          1990        Visit Information        Provider Department      11/22/2017 4:30 PM Amber Crockett PA-C [facility]        Today's Diagnoses     Supervision of other normal pregnancy, antepartum    -  1       Follow-ups after your visit        Your next 10 appointments already scheduled     Dec 07, 2017  6:00 PM CST   ESTABLISHED PRENATAL with Amber Crockett PA-C   [facility] ([facility])    [address] [place] [address]   [phone]            Dec 21, 2017  6:00 PM CST   ESTABLISHED PRENATAL with Amber Crockett PA-C   [facility] ([facility])    [address] [place] [address]   [phone]            Jan 04, 2018  6:00 PM CST   ESTABLISHED PRENATAL with Amber Crockett PA-C   [facility] ([facility])    [address] [place] [address]   [phone]            Jan 11, 2018  6:00 PM CST   ESTABLISHED PRENATAL with Amber Crockett PA-C   [facility] ([facility])    [address] [place] [address]   [phone]            Jan 18, 2018  6:00 PM CST   ESTABLISHED PRENATAL with Amber Crockett PA-C   [facility] ([facility])    [address] [place] [address]   [phone]            Jan 25, 2018  6:00 PM CST   ESTABLISHED PRENATAL with Amber Crockett PA-C   [facility] ([facility])    [address] [place] [address]   [phone]            Feb 01, 2018  6:00 PM CST   ESTABLISHED PRENATAL with Amber Crockett PA-C   [facility] ([facility])    [address] [place] [address]   [phone]            Feb 08, 2018  6:00 PM  CST   ESTABLISHED PRENATAL with Amber Crockett PA-C   [facility] ([facility])    [address] [address] [phone]              Who to contact     If you have questions or need follow up information about today's clinic visit or your schedule please contact [facility] directly at [phone].  Normal or non-critical lab and imaging results will be communicated to you by V-Keyhart, letter or phone within 4 business days after the clinic has received the results. If you do not hear from us within 7 days, please contact the clinic through V-Keyhart or phone. If you have a critical or abnormal lab result, we will notify you by phone as soon as possible.  Submit refill requests through Tagmore Solutions or call your pharmacy and they will forward the refill request to us. Please allow 3 business days for your refill to be completed.          Additional Information About Your Visit        V-KeyharOasys Mobile Information     Tag[employer] gives you secure access to your electronic health record. If you see a primary care provider, you can also send messages to your care team and make appointments. If you have questions, please call your primary care clinic.  If you do not have a primary care provider, please call [phone] and they will assist you.        Care EveryWhere ID     This is your Care EveryWhere ID. This could be used by other organizations to access your [place] medical records  [id number]        Your Vitals Were     Pulse Temperature Respirations Last Period Pulse Oximetry BMI (Body Mass Index)    71 98.2  F (36.8  C) (Tympanic) 18 04/28/2017 (Exact Date) 100% 35.89 kg/m2       Blood Pressure from Last 3 Encounters:   11/22/17 110/70   11/09/17 116/70   10/12/17 116/70    Weight from Last 3 Encounters:   11/22/17 217 lb (98.4 kg)   11/09/17 214 lb (97.1 kg)   10/12/17 209 lb (94.8 kg)              Today, you had the following     No orders found for display        Primary Care Provider Office Phone # Fax #    Amber Crockett PA-C [phone] [address] [place] [address]        Equal Access to Services     LUCRECIA ZAFAR : Lennie luis panchoo Soswathiali, waaxda luqadaha, qaybta kaalmada adethaoda, octaviano patrick laDevendrasabi west. So [facility] [phone].    ATENCIÓN: Si habla español, tiene a grimaldo disposición servicios gratuitos de asistencia lingüística. Llame al [phone].    We comply with applicable federal civil rights laws and [place] laws. We do not discriminate on the basis of race, color, national origin, age, disability, sex, sexual orientation, or gender identity.            Thank you!     Thank you for choosing [facility]  for your care. Our goal is always to provide you with excellent care. Hearing back from our patients is one way we can continue to improve our services. Please take a few minutes to complete the written survey that you may receive in the mail after your visit with us. Thank you!             Your Updated Medication List - Protect others around you: Learn how to safely use, store and throw away your medicines at www.disposemymeds.org.          This list is accurate as of: 11/22/17  4:49 PM.  Always use your most recent med list.                   Brand Name Dispense Instructions for use Diagnosis    prenatal multivitamin plus iron 27-0.8 MG Tabs per tablet      Take 1 tablet by mouth daily

## 2017-11-22 NOTE — NURSING NOTE
"Chief Complaint   Patient presents with     Prenatal Care       Initial /70  Pulse 71  Temp 98.2  F (36.8  C) (Tympanic)  Resp 18  Wt 217 lb (98.4 kg)  LMP 04/28/2017 (Exact Date)  SpO2 100%  BMI 35.89 kg/m2 Estimated body mass index is 35.89 kg/(m^2) as calculated from the following:    Height as of 2/17/17: 5' 5.2\" (1.656 m).    Weight as of this encounter: 217 lb (98.4 kg).  BP completed using cuff size: eduar Ferrer MA      "

## 2017-12-07 ENCOUNTER — PRENATAL OFFICE VISIT (OUTPATIENT)
Dept: FAMILY MEDICINE | Facility: CLINIC | Age: 27
End: 2017-12-07
Payer: COMMERCIAL

## 2017-12-07 VITALS
SYSTOLIC BLOOD PRESSURE: 120 MMHG | WEIGHT: 219 LBS | BODY MASS INDEX: 36.22 KG/M2 | OXYGEN SATURATION: 97 % | HEART RATE: 88 BPM | RESPIRATION RATE: 18 BRPM | DIASTOLIC BLOOD PRESSURE: 80 MMHG | TEMPERATURE: 97.7 F

## 2017-12-07 DIAGNOSIS — Z34.80 SUPERVISION OF OTHER NORMAL PREGNANCY, ANTEPARTUM: Primary | ICD-10-CM

## 2017-12-07 PROBLEM — D64.9 LOW HEMOGLOBIN: Status: RESOLVED | Noted: 2017-07-21 | Resolved: 2017-12-07

## 2017-12-07 PROCEDURE — 99207 ZZC PRENATAL VISIT: CPT | Performed by: PHYSICIAN ASSISTANT

## 2017-12-07 ASSESSMENT — PAIN SCALES - GENERAL: PAINLEVEL: NO PAIN (0)

## 2017-12-07 NOTE — MR AVS SNAPSHOT
After Visit Summary   12/7/2017    Wyatt Bruno    MRN: [id number]           Patient Information     Date Of Birth          1990        Visit Information        Provider Department      12/7/2017 6:00 PM Amber Crockett PA-C [facility]        Today's Diagnoses     Supervision of other normal pregnancy, antepartum    -  1       Follow-ups after your visit        Your next 10 appointments already scheduled     Dec 21, 2017  6:00 PM CST   ESTABLISHED PRENATAL with Amber Crockett PA-C   [facility] ([facility])    [address] [place] [address]   [phone]            Jan 04, 2018  6:00 PM CST   ESTABLISHED PRENATAL with Amber Crockett PA-C   [facility] ([facility])    [address] [place] [address]   [phone]            Jan 11, 2018  6:00 PM CST   ESTABLISHED PRENATAL with Amber Crockett PA-C   [facility] ([facility])    [address] [place] [address]   [phone]            Jan 18, 2018  6:00 PM CST   ESTABLISHED PRENATAL with Amber Crockett PA-C   [facility] ([facility])    [address] [place] [address]   [phone]            Jan 25, 2018  6:00 PM CST   ESTABLISHED PRENATAL with Amber Crockett PA-C   [facility] ([facility])    [address] [place] [address]   [phone]            Feb 01, 2018  6:00 PM CST   ESTABLISHED PRENATAL with Amber Crockett PA-C   [facility] ([facility])    [address] [place] [address]   [phone]            Feb 08, 2018  6:00 PM CST   ESTABLISHED PRENATAL with Amber Crockett PA-C   [facility] ([facility])    [address] [place] [address]   [phone]              Who to contact     If  you have questions or need follow up information about today's clinic visit or your schedule please contact [facility] directly at [phone].  Normal or non-critical lab and imaging results will be communicated to you by MyChart, letter or phone within 4 business days after the clinic has received the results. If you do not hear from us within 7 days, please contact the clinic through Opencarehart or phone. If you have a critical or abnormal lab result, we will notify you by phone as soon as possible.  Submit refill requests through Billtrust or call your pharmacy and they will forward the refill request to us. Please allow 3 business days for your refill to be completed.          Additional Information About Your Visit        OpencareharMedaNext Information     Billtrust gives you secure access to your electronic health record. If you see a primary care provider, you can also send messages to your care team and make appointments. If you have questions, please call your primary care clinic.  If you do not have a primary care provider, please call [phone] and they will assist you.        Care EveryWhere ID     This is your Care EveryWhere ID. This could be used by other organizations to access your [place] medical records  [id number]        Your Vitals Were     Pulse Temperature Respirations Last Period Pulse Oximetry BMI (Body Mass Index)    88 97.7  F (36.5  C) (Tympanic) 18 04/28/2017 (Exact Date) 97% 36.22 kg/m2       Blood Pressure from Last 3 Encounters:   12/07/17 120/80   11/22/17 110/70   11/09/17 116/70    Weight from Last 3 Encounters:   12/07/17 219 lb (99.3 kg)   11/22/17 217 lb (98.4 kg)   11/09/17 214 lb (97.1 kg)              Today, you had the following     No orders found for display       Primary Care Provider Office Phone # Fax #    Amber Crockett PA-C [phone]       [address] [place] [address]        Equal Access to Services     LUCRECIA ZAFAR AH: Lennie luis  shawn [place], thu woodsmarioha, markta kastacey yecenianhung, octaviano alvaroin hayaalu keenebryan marleneodalis lalonglu brett. So [facility] [phone].    ATENCIÓN: Si habla español, tiene a grimaldo disposición servicios gratuitos de asistencia lingüística. Kristian al [phone].    We comply with applicable federal civil rights laws and [place] laws. We do not discriminate on the basis of race, color, national origin, age, disability, sex, sexual orientation, or gender identity.            Thank you!     Thank you for choosing [facility]  for your care. Our goal is always to provide you with excellent care. Hearing back from our patients is one way we can continue to improve our services. Please take a few minutes to complete the written survey that you may receive in the mail after your visit with us. Thank you!             Your Updated Medication List - Protect others around you: Learn how to safely use, store and throw away your medicines at www.disposemymeds.org.          This list is accurate as of: 12/7/17  6:23 PM.  Always use your most recent med list.                   Brand Name Dispense Instructions for use Diagnosis    prenatal multivitamin plus iron 27-0.8 MG Tabs per tablet      Take 1 tablet by mouth daily

## 2017-12-08 NOTE — NURSING NOTE
"Chief Complaint   Patient presents with     Prenatal Care       Initial /80  Pulse 88  Temp 97.7  F (36.5  C) (Tympanic)  Resp 18  Wt 219 lb (99.3 kg)  LMP 04/28/2017 (Exact Date)  SpO2 97%  BMI 36.22 kg/m2 Estimated body mass index is 36.22 kg/(m^2) as calculated from the following:    Height as of 2/17/17: 5' 5.2\" (1.656 m).    Weight as of this encounter: 219 lb (99.3 kg).  BP completed using cuff size: eduar Ferrer MA      "

## 2017-12-08 NOTE — PROGRESS NOTES
Doing well.  No concerns today.  No vaginal bleeding, leakage, or contractions.  Baby's movement is active and frequent.  Taking PNV.  Discussed kick counts and fetal movement.  Signs/symptoms of hypertension and pre-eclampsia reviewed with patient today. She should contact the hospital OB dept if any new concerning symptoms.  Reminded of upcoming GBS swab.      RTC in 2 weeks.     Electronically signed by Amber Crockett PA-C  12/7/2017

## 2017-12-21 ENCOUNTER — PRENATAL OFFICE VISIT (OUTPATIENT)
Dept: FAMILY MEDICINE | Facility: CLINIC | Age: 27
End: 2017-12-21
Payer: COMMERCIAL

## 2017-12-21 VITALS
HEART RATE: 100 BPM | BODY MASS INDEX: 36.55 KG/M2 | SYSTOLIC BLOOD PRESSURE: 116 MMHG | WEIGHT: 221 LBS | OXYGEN SATURATION: 97 % | TEMPERATURE: 98.1 F | DIASTOLIC BLOOD PRESSURE: 78 MMHG | RESPIRATION RATE: 18 BRPM

## 2017-12-21 DIAGNOSIS — Z34.80 SUPERVISION OF OTHER NORMAL PREGNANCY, ANTEPARTUM: Primary | ICD-10-CM

## 2017-12-21 PROCEDURE — 99207 ZZC PRENATAL VISIT: CPT | Performed by: PHYSICIAN ASSISTANT

## 2017-12-21 ASSESSMENT — PAIN SCALES - GENERAL: PAINLEVEL: NO PAIN (0)

## 2017-12-21 NOTE — MR AVS SNAPSHOT
After Visit Summary   12/21/2017    Wyatt Bruno    MRN: [id number]           Patient Information     Date Of Birth          1990        Visit Information        Provider Department      12/21/2017 6:00 PM Amber Crockett PA-C [facility]        Today's Diagnoses     Supervision of other normal pregnancy, antepartum    -  1       Follow-ups after your visit        Your next 10 appointments already scheduled     Jan 04, 2018  6:00 PM CST   ESTABLISHED PRENATAL with Amber Crockett PA-C   [facility] ([facility])    [address] [place] [address]   [phone]            Jan 11, 2018  6:00 PM CST   ESTABLISHED PRENATAL with Amber Crockett PA-C   [facility] ([facility])    [address] [place] [address]   [phone]            Jan 18, 2018  6:00 PM CST   ESTABLISHED PRENATAL with Amber Crockett PA-C   [facility] ([facility])    [address] [place] [address]   [phone]            Jan 25, 2018  6:00 PM CST   ESTABLISHED PRENATAL with Amber Crockett PA-C   [facility] ([facility])    [address] [place] [address]   [phone]            Feb 01, 2018  6:00 PM CST   ESTABLISHED PRENATAL with Amber Crockett PA-C   [facility] ([facility])    [address] [place] [address]   [phone]            Feb 08, 2018  6:00 PM CST   ESTABLISHED PRENATAL with Amber Crockett PA-C   [facility] ([facility])    [address] [place] [address]   [phone]              Who to contact     If you have questions or need follow up information about today's clinic visit or your schedule please contact [facility] directly at [phone].  Normal or non-critical lab and imaging results  will be communicated to you by Checkrhart, letter or phone within 4 business days after the clinic has received the results. If you do not hear from us within 7 days, please contact the clinic through Valentin Uzhun or phone. If you have a critical or abnormal lab result, we will notify you by phone as soon as possible.  Submit refill requests through Valentin Uzhun or call your pharmacy and they will forward the refill request to us. Please allow 3 business days for your refill to be completed.          Additional Information About Your Visit        Valentin Uzhun Information     Valentin Uzhun gives you secure access to your electronic health record. If you see a primary care provider, you can also send messages to your care team and make appointments. If you have questions, please call your primary care clinic.  If you do not have a primary care provider, please call [phone] and they will assist you.        Care EveryWhere ID     This is your Care EveryWhere ID. This could be used by other organizations to access your [place] medical records  [id number]        Your Vitals Were     Pulse Temperature Respirations Last Period Pulse Oximetry BMI (Body Mass Index)    100 98.1  F (36.7  C) (Tympanic) 18 04/28/2017 (Exact Date) 97% 36.55 kg/m2       Blood Pressure from Last 3 Encounters:   12/21/17 116/78   12/07/17 120/80   11/22/17 110/70    Weight from Last 3 Encounters:   12/21/17 221 lb (100.2 kg)   12/07/17 219 lb (99.3 kg)   11/22/17 217 lb (98.4 kg)              Today, you had the following     No orders found for display       Primary Care Provider Office Phone # Fax #    Amber Crockett PA-C [phone] [address] [place] [address]        Equal Access to Services     [facility] AH: Hadii laurence Flores, waericada luqadaha, qaybta kaalmada adebryanyada, octaviano west. So [facility] [phone].    ATENCIÓN: Si habla español, tiene a grimaldo disposición servicios gratuitos de  asistencia lingüística. Kristian al [phone].    We comply with applicable federal civil rights laws and [place] laws. We do not discriminate on the basis of race, color, national origin, age, disability, sex, sexual orientation, or gender identity.            Thank you!     Thank you for choosing [facility]  for your care. Our goal is always to provide you with excellent care. Hearing back from our patients is one way we can continue to improve our services. Please take a few minutes to complete the written survey that you may receive in the mail after your visit with us. Thank you!             Your Updated Medication List - Protect others around you: Learn how to safely use, store and throw away your medicines at www.disposemymeds.org.          This list is accurate as of: 12/21/17  6:29 PM.  Always use your most recent med list.                   Brand Name Dispense Instructions for use Diagnosis    prenatal multivitamin plus iron 27-0.8 MG Tabs per tablet      Take 1 tablet by mouth daily

## 2017-12-22 NOTE — NURSING NOTE
"Chief Complaint   Patient presents with     Prenatal Care       Initial /78  Pulse 100  Temp 98.1  F (36.7  C) (Tympanic)  Resp 18  Wt 221 lb (100.2 kg)  LMP 04/28/2017 (Exact Date)  SpO2 97%  BMI 36.55 kg/m2 Estimated body mass index is 36.55 kg/(m^2) as calculated from the following:    Height as of 2/17/17: 5' 5.2\" (1.656 m).    Weight as of this encounter: 221 lb (100.2 kg).  BP completed using cuff size: eduar Ferrer MA      "

## 2017-12-22 NOTE — PROGRESS NOTES
Doing well.  No concerns today.  No vaginal bleeding, leakage, or contractions.  Baby's movement is active and frequent.  Taking PNV.  Discussed kick counts and fetal movement.  Signs/symptoms of hypertension and pre-eclampsia reviewed with patient today. She should contact the hospital OB dept if any new concerning symptoms.  Reminded of upcoming GBS swab.      RTC in 2 weeks.     Electronically signed by Amber Crockett PA-C  12/21/2017

## 2018-01-04 ENCOUNTER — PRENATAL OFFICE VISIT (OUTPATIENT)
Dept: FAMILY MEDICINE | Facility: CLINIC | Age: 28
End: 2018-01-04
Payer: COMMERCIAL

## 2018-01-04 VITALS
OXYGEN SATURATION: 100 % | DIASTOLIC BLOOD PRESSURE: 78 MMHG | BODY MASS INDEX: 36.55 KG/M2 | TEMPERATURE: 98.1 F | WEIGHT: 221 LBS | SYSTOLIC BLOOD PRESSURE: 116 MMHG | HEART RATE: 88 BPM | RESPIRATION RATE: 18 BRPM

## 2018-01-04 DIAGNOSIS — Z34.80 SUPERVISION OF OTHER NORMAL PREGNANCY, ANTEPARTUM: Primary | ICD-10-CM

## 2018-01-04 LAB — HGB BLD-MCNC: 10.1 G/DL (ref 11.7–15.7)

## 2018-01-04 PROCEDURE — 00000218 ZZHCL STATISTIC OBHBG - HEMOGLOBIN: Performed by: PHYSICIAN ASSISTANT

## 2018-01-04 PROCEDURE — 87653 STREP B DNA AMP PROBE: CPT | Performed by: PHYSICIAN ASSISTANT

## 2018-01-04 PROCEDURE — 99207 ZZC PRENATAL VISIT: CPT | Performed by: PHYSICIAN ASSISTANT

## 2018-01-04 PROCEDURE — 36415 COLL VENOUS BLD VENIPUNCTURE: CPT | Performed by: PHYSICIAN ASSISTANT

## 2018-01-04 ASSESSMENT — PAIN SCALES - GENERAL: PAINLEVEL: NO PAIN (0)

## 2018-01-04 NOTE — MR AVS SNAPSHOT
After Visit Summary   1/4/2018    Wyatt Bruno    MRN: [id number]           Patient Information     Date Of Birth          1990        Visit Information        Provider Department      1/4/2018 6:00 PM Amber Crockett PA-C [facility]        Today's Diagnoses     Supervision of other normal pregnancy, antepartum    -  1       Follow-ups after your visit        Your next 10 appointments already scheduled     Jan 11, 2018  6:00 PM CST   ESTABLISHED PRENATAL with Amber Crockett PA-C   [facility]    [address] [place] [address]   [phone]            Jan 18, 2018  6:00 PM CST   ESTABLISHED PRENATAL with Amber Crockett PA-C   [facility]    [address] [place] [address]   [phone]            Jan 25, 2018  6:00 PM CST   ESTABLISHED PRENATAL with Amber Crockett PA-C   [facility]    [address] [place] [address] [phone]            Feb 01, 2018  6:00 PM CST   ESTABLISHED PRENATAL with Amber Crockett PA-C   [facility]    [address] [place] [address]   [phone]              Who to contact     If you have questions or need follow up information about today's clinic visit or your schedule please contact [facility] directly at [phone].  Normal or non-critical lab and imaging results will be communicated to you by MyChart, letter or phone within 4 business days after the clinic has received the results. If you do not hear from us within 7 days, please contact the clinic through ArtCorgihart or phone. If you have a critical or abnormal lab result, we will notify you by phone as soon as possible.  Submit refill requests through JustInvesting or call your pharmacy and they will forward the refill request to us.  Please allow 3 business days for your refill to be completed.          Additional Information About Your Visit        MyChart Information     [email or web address] gives you secure access to your electronic health record. If you see a primary care provider, you can also send messages to your care team and make appointments. If you have questions, please call your primary care clinic.  If you do not have a primary care provider, please call [phone] and they will assist you.        Care EveryWhere ID     This is your Care EveryWhere ID. This could be used by other organizations to access your [place] medical records  [id number]        Your Vitals Were     Pulse Temperature Respirations Last Period Pulse Oximetry BMI (Body Mass Index)    88 98.1  F (36.7  C) (Tympanic) 18 04/28/2017 (Exact Date) 100% 36.55 kg/m2       Blood Pressure from Last 3 Encounters:   01/04/18 116/78   12/21/17 116/78   12/07/17 120/80    Weight from Last 3 Encounters:   01/04/18 221 lb (100.2 kg)   12/21/17 221 lb (100.2 kg)   12/07/17 219 lb (99.3 kg)              We Performed the Following     OB hemoglobin     Strep, Group B by PCR        Primary Care Provider Office Phone # Fax #    Amber Crockett PA-C [phone] [address] [place] [address]        Equal Access to Services     LUCRECIA ZAFAR : Hadii aad ku hadasho Soomaali, waaxda luqadaha, qaybta kaalmada adeegyada, octaviano west. So [facility] [phone].    ATENCIÓN: Si habla español, tiene a grimaldo disposición servicios gratuitos de asistencia lingüística. Llame al [phone].    We comply with applicable federal civil rights laws and [place] laws. We do not discriminate on the basis of race, color, national origin, age, disability, sex, sexual orientation, or gender identity.            Thank you!     Thank you for choosing [facility]  for your care. Our goal is always to provide you with excellent care. Hearing back from  our patients is one way we can continue to improve our services. Please take a few minutes to complete the written survey that you may receive in the mail after your visit with us. Thank you!             Your Updated Medication List - Protect others around you: Learn how to safely use, store and throw away your medicines at www.disposemymeds.org.          This list is accurate as of: 1/4/18 11:59 PM.  Always use your most recent med list.                   Brand Name Dispense Instructions for use Diagnosis    prenatal multivitamin plus iron 27-0.8 MG Tabs per tablet      Take 1 tablet by mouth daily

## 2018-01-05 LAB
GP B STREP DNA SPEC QL NAA+PROBE: NEGATIVE
SPECIMEN SOURCE: NORMAL

## 2018-01-05 NOTE — NURSING NOTE
"Chief Complaint   Patient presents with     Prenatal Care       Initial /78  Pulse 88  Temp 98.1  F (36.7  C) (Tympanic)  Resp 18  Wt 221 lb (100.2 kg)  LMP 04/28/2017 (Exact Date)  SpO2 100%  BMI 36.55 kg/m2 Estimated body mass index is 36.55 kg/(m^2) as calculated from the following:    Height as of 2/17/17: 5' 5.2\" (1.656 m).    Weight as of this encounter: 221 lb (100.2 kg).  BP completed using cuff size: eduar Ferrer MA      "

## 2018-01-07 NOTE — PROGRESS NOTES
[place]   Your GBS is negative. Your hemoglobin is low normal for pregnancy - we will discuss further at your next visit.

## 2018-01-07 NOTE — PROGRESS NOTES
Doing well.  No concerns today. Continues to take PNV.  No vaginal bleeding, leakage, or contractions.  Baby's movement is active and frequent.  Discussed signs of labor and when to call or come in.   Discussed kick counts and fetal movement.  GBS was done today.  Serum labs including hemoglobin done today as well.  Please call if persistent HA, blurred vision, or swelling  She will report to OB if contractions every 5-10 minutes or if rupture of membranes.  RTC in 1 week    Electronically signed by Amber Crockett PA-C  1/7/2018

## 2018-01-11 ENCOUNTER — PRENATAL OFFICE VISIT (OUTPATIENT)
Dept: FAMILY MEDICINE | Facility: CLINIC | Age: 28
End: 2018-01-11
Payer: COMMERCIAL

## 2018-01-11 VITALS
SYSTOLIC BLOOD PRESSURE: 116 MMHG | WEIGHT: 228 LBS | HEIGHT: 65 IN | BODY MASS INDEX: 37.99 KG/M2 | DIASTOLIC BLOOD PRESSURE: 82 MMHG | TEMPERATURE: 98.7 F | OXYGEN SATURATION: 97 % | RESPIRATION RATE: 18 BRPM | HEART RATE: 78 BPM

## 2018-01-11 DIAGNOSIS — D50.9 IRON DEFICIENCY ANEMIA, UNSPECIFIED IRON DEFICIENCY ANEMIA TYPE: Primary | ICD-10-CM

## 2018-01-11 DIAGNOSIS — Z34.80 SUPERVISION OF OTHER NORMAL PREGNANCY, ANTEPARTUM: ICD-10-CM

## 2018-01-11 PROCEDURE — 99207 ZZC PRENATAL VISIT: CPT | Performed by: PHYSICIAN ASSISTANT

## 2018-01-11 RX ORDER — FERROUS SULFATE 325(65) MG
325 TABLET ORAL
Qty: 90 TABLET | Refills: 2 | Status: SHIPPED | OUTPATIENT
Start: 2018-01-11 | End: 2018-07-16

## 2018-01-11 ASSESSMENT — PAIN SCALES - GENERAL: PAINLEVEL: NO PAIN (0)

## 2018-01-11 NOTE — MR AVS SNAPSHOT
After Visit Summary   1/11/2018    Wyatt Bruno    MRN: [id number]           Patient Information     Date Of Birth          1990        Visit Information        Provider Department      1/11/2018 6:00 PM Amber Crockett PA-C [facility]        Today's Diagnoses     Iron deficiency anemia, unspecified iron deficiency anemia type    -  1    Supervision of other normal pregnancy, antepartum           Follow-ups after your visit        Your next 10 appointments already scheduled     Jan 18, 2018  6:00 PM CST   ESTABLISHED PRENATAL with Amber Crockett PA-C   [facility]    [address] [place] [address]   [phone]            Jan 25, 2018  6:00 PM CST   ESTABLISHED PRENATAL with Amber Crockett PA-C   [facility] ([address] [address] [phone]            Feb 01, 2018  6:00 PM CST   ESTABLISHED PRENATAL with Amber Crockett PA-C   [facility] ([address] [id number] [phone]              Who to contact     If you have questions or need follow up information about today's clinic visit or your schedule please contact [facility] directly at [phone].  Normal or non-critical lab and imaging results will be communicated to you by MyChart, letter or phone within 4 business days after the clinic has received the results. If you do not hear from us within 7 days, please contact the clinic through MyChart or phone. If you have a critical or abnormal lab result, we will notify you by phone as soon as possible.  Submit refill requests through [employer] or call your pharmacy and they will forward the refill request to us. Please allow 3 business days for your refill to be completed.          Additional Information About Your Visit        MyChart Information   "   PolarizonicsraghavendraFuzmo gives you secure access to your electronic health record. If you see a primary care provider, you can also send messages to your care team and make appointments. If you have questions, please call your primary care clinic.  If you do not have a primary care provider, please call [phone] and they will assist you.        Care EveryWhere ID     This is your Care EveryWhere ID. This could be used by other organizations to access your [place] medical records  [id number]        Your Vitals Were     Pulse Temperature Respirations Height Last Period Pulse Oximetry    78 98.7  F (37.1  C) (Tympanic) 18 5' 5\" (1.651 m) 04/28/2017 (Exact Date) 97%    BMI (Body Mass Index)                   37.94 kg/m2            Blood Pressure from Last 3 Encounters:   01/11/18 116/82   01/04/18 116/78   12/21/17 116/78    Weight from Last 3 Encounters:   01/11/18 228 lb (103.4 kg)   01/04/18 221 lb (100.2 kg)   12/21/17 221 lb (100.2 kg)              Today, you had the following     No orders found for display         Today's Medication Changes          These changes are accurate as of: 1/11/18  6:21 PM.  If you have any questions, ask your nurse or doctor.               Start taking these medicines.        Dose/Directions    ferrous sulfate 325 (65 FE) MG tablet   Commonly known as:  IRON   Used for:  Iron deficiency anemia, unspecified iron deficiency anemia type   Started by:  Amber Crockett PA-C        Dose:  325 mg   Take 1 tablet (325 mg) by mouth daily (with breakfast)   Quantity:  90 tablet   Refills:  2            Where to get your medicines      These medications were sent to [facility] [address] [address]     Phone:  [phone]     ferrous sulfate 325 (65 FE) MG tablet                Primary Care Provider Office Phone # Fax #    Amber Crockett PA-C [phone]       [address] [place] [address]        Equal Access to Services     [facility] " GAAR : Hadii aad ku hadalbert Flores, waericada luqadaha, qaybta kastacey yeceniathaomargo, octaviano rosariotcshane west. So [facility] [phone].    ATENCIÓN: Si habla español, tiene a grimaldo disposición servicios gratuitos de asistencia lingüística. Llame al [phone].    We comply with applicable federal civil rights laws and [place] laws. We do not discriminate on the basis of race, color, national origin, age, disability, sex, sexual orientation, or gender identity.            Thank you!     Thank you for choosing [facility]  for your care. Our goal is always to provide you with excellent care. Hearing back from our patients is one way we can continue to improve our services. Please take a few minutes to complete the written survey that you may receive in the mail after your visit with us. Thank you!             Your Updated Medication List - Protect others around you: Learn how to safely use, store and throw away your medicines at www.disposemymeds.org.          This list is accurate as of: 1/11/18  6:21 PM.  Always use your most recent med list.                   Brand Name Dispense Instructions for use Diagnosis    ferrous sulfate 325 (65 FE) MG tablet    IRON    90 tablet    Take 1 tablet (325 mg) by mouth daily (with breakfast)    Iron deficiency anemia, unspecified iron deficiency anemia type       prenatal multivitamin plus iron 27-0.8 MG Tabs per tablet      Take 1 tablet by mouth daily

## 2018-01-12 NOTE — NURSING NOTE
"Chief Complaint   Patient presents with     Prenatal Care       Initial /82  Pulse 78  Temp 98.7  F (37.1  C) (Tympanic)  Resp 18  Ht 5' 5\" (1.651 m)  Wt 228 lb (103.4 kg)  LMP 04/28/2017 (Exact Date)  SpO2 97%  BMI 37.94 kg/m2 Estimated body mass index is 37.94 kg/(m^2) as calculated from the following:    Height as of this encounter: 5' 5\" (1.651 m).    Weight as of this encounter: 228 lb (103.4 kg).  BP completed using cuff size: eduar Ferrer MA      "

## 2018-01-12 NOTE — PROGRESS NOTES
Doing well.  No concerns today. Continues to take PNV.  Recent hemoglobin was 10.1.  Will start iron once daily-this was ordered to her pharmacy.  Will take the medication with orange juice.  No vaginal bleeding, leakage, or contractions.  Baby's movement is active and frequent.  Discussed signs of labor and when to call or come in.   Discussed kick counts and fetal movement.  GBS returned  negative.  Please call if persistent HA, blurred vision, or swelling  She will report to OB if contractions every 5-10 minutes or if rupture of membranes.  RTC in 1 week    Electronically signed by Amber Crockett PA-C  1/11/2018

## 2018-01-18 ENCOUNTER — PRENATAL OFFICE VISIT (OUTPATIENT)
Dept: FAMILY MEDICINE | Facility: CLINIC | Age: 28
End: 2018-01-18
Payer: COMMERCIAL

## 2018-01-18 VITALS
TEMPERATURE: 98.2 F | DIASTOLIC BLOOD PRESSURE: 80 MMHG | HEART RATE: 88 BPM | OXYGEN SATURATION: 97 % | SYSTOLIC BLOOD PRESSURE: 118 MMHG | RESPIRATION RATE: 18 BRPM | WEIGHT: 230 LBS | BODY MASS INDEX: 38.27 KG/M2

## 2018-01-18 DIAGNOSIS — Z34.80 SUPERVISION OF OTHER NORMAL PREGNANCY, ANTEPARTUM: Primary | ICD-10-CM

## 2018-01-18 PROCEDURE — 99207 ZZC PRENATAL VISIT: CPT | Performed by: PHYSICIAN ASSISTANT

## 2018-01-18 ASSESSMENT — PAIN SCALES - GENERAL: PAINLEVEL: NO PAIN (0)

## 2018-01-18 NOTE — MR AVS SNAPSHOT
After Visit Summary   1/18/2018    Wyatt Bruno    MRN: [id number]           Patient Information     Date Of Birth          1990        Visit Information        Provider Department      1/18/2018 6:00 PM Amber Crockett PA-C [facility]        Today's Diagnoses     Supervision of other normal pregnancy, antepartum    -  1       Follow-ups after your visit        Your next 10 appointments already scheduled     Jan 25, 2018  6:00 PM CST   ESTABLISHED PRENATAL with Amber Crockett PA-C   [facility] ([address] [phone] [phone]            Feb 01, 2018  6:00 PM CST   ESTABLISHED PRENATAL with Amber Crockett PA-C   [facility] ([address] [id number] [phone]              Who to contact     If you have questions or need follow up information about today's clinic visit or your schedule please contact [facility] directly at [phone].  Normal or non-critical lab and imaging results will be communicated to you by [email or web address], letter or phone within 4 business days after the clinic has received the results. If you do not hear from us within 7 days, please contact the clinic through Sparktrendt or phone. If you have a critical or abnormal lab result, we will notify you by phone as soon as possible.  Submit refill requests through Tyromer or call your pharmacy and they will forward the refill request to us. Please allow 3 business days for your refill to be completed.          Additional Information About Your Visit        [email or web address] Information     Tyromer gives you secure access to your electronic health record. If you see a primary care provider, you can also send messages to your care team and make appointments. If you have questions, please call your primary care clinic.  If you do not have a primary care provider,  please call [phone] and they will assist you.        Care EveryWhere ID     This is your Care EveryWhere ID. This could be used by other organizations to access your [place] medical records  [id number]        Your Vitals Were     Pulse Temperature Respirations Last Period Pulse Oximetry BMI (Body Mass Index)    88 98.2  F (36.8  C) (Tympanic) 18 04/28/2017 (Exact Date) 97% 38.27 kg/m2       Blood Pressure from Last 3 Encounters:   01/18/18 118/80   01/11/18 116/82   01/04/18 116/78    Weight from Last 3 Encounters:   01/18/18 230 lb (104.3 kg)   01/11/18 228 lb (103.4 kg)   01/04/18 221 lb (100.2 kg)              Today, you had the following     No orders found for display       Primary Care Provider Office Phone # Fax #    Amber Crockett PA-C [phone] [address] [place] [address]        Equal Access to Services     [facility] : Hadii aad ku hadasho Soomaali, waaxda luqadaha, qaybta kaalmada adeegyada, waxay idiin hayheavenn ale giles . So [facility] [phone].    ATENCIÓN: Si habla español, tiene a grimaldo disposición servicios gratuitos de asistencia lingüística. LlUniversity [facility]als [facility] [phone].    We comply with applicable federal civil rights laws and [place] laws. We do not discriminate on the basis of race, color, national origin, age, disability, sex, sexual orientation, or gender identity.            Thank you!     Thank you for choosing [facility]  for your care. Our goal is always to provide you with excellent care. Hearing back from our patients is one way we can continue to improve our services. Please take a few minutes to complete the written survey that you may receive in the mail after your visit with us. Thank you!             Your Updated Medication List - Protect others around you: Learn how to safely use, store and throw away your medicines at www.disposemymeds.org.          This list is accurate as of: 1/18/18 11:59 PM.  Always use your most  recent med list.                   Brand Name Dispense Instructions for use Diagnosis    ferrous sulfate 325 (65 FE) MG tablet    IRON    90 tablet    Take 1 tablet (325 mg) by mouth daily (with breakfast)    Iron deficiency anemia, unspecified iron deficiency anemia type       prenatal multivitamin plus iron 27-0.8 MG Tabs per tablet      Take 1 tablet by mouth daily

## 2018-01-19 NOTE — PROGRESS NOTES
Doing well.  No concerns today. Continues to take PNV.  No vaginal bleeding, leakage, or contractions.  Baby's movement is active and frequent.  Discussed signs of labor and when to call or come in.   Discussed kick counts and fetal movement.  GBS returned  negative.  Please call if persistent HA, blurred vision, or swelling  She will report to OB if contractions every 5-10 minutes or if rupture of membranes.  RTC in 1 week    Electronically signed by Amber Crockett PA-C  1/19/2018

## 2018-01-20 ENCOUNTER — HOSPITAL ENCOUNTER (OUTPATIENT)
Facility: CLINIC | Age: 28
Discharge: HOME | End: 2018-01-20
Attending: FAMILY MEDICINE | Admitting: FAMILY MEDICINE
Payer: COMMERCIAL

## 2018-01-20 VITALS — DIASTOLIC BLOOD PRESSURE: 75 MMHG | SYSTOLIC BLOOD PRESSURE: 127 MMHG | TEMPERATURE: 97.6 F | RESPIRATION RATE: 18 BRPM

## 2018-01-20 PROBLEM — Z36.89 ENCOUNTER FOR TRIAGE IN PREGNANT PATIENT: Status: ACTIVE | Noted: 2018-01-20

## 2018-01-20 LAB
A1 MICROGLOB PLACENTAL VAG QL: NEGATIVE
ALBUMIN UR-MCNC: NEGATIVE MG/DL
APPEARANCE UR: CLEAR
BACTERIA #/AREA URNS HPF: ABNORMAL /HPF
BILIRUB UR QL STRIP: NEGATIVE
COLOR UR AUTO: YELLOW
GLUCOSE UR STRIP-MCNC: NEGATIVE MG/DL
HGB UR QL STRIP: NEGATIVE
KETONES UR STRIP-MCNC: NEGATIVE MG/DL
LEUKOCYTE ESTERASE UR QL STRIP: NEGATIVE
MUCOUS THREADS #/AREA URNS LPF: PRESENT /LPF
NITRATE UR QL: NEGATIVE
PH UR STRIP: 6 PH (ref 5–7)
RBC #/AREA URNS AUTO: 0 /HPF (ref 0–2)
SOURCE: ABNORMAL
SP GR UR STRIP: 1.02 (ref 1–1.03)
SQUAMOUS #/AREA URNS AUTO: 1 /HPF (ref 0–1)
UROBILINOGEN UR STRIP-MCNC: 0 MG/DL (ref 0–2)
WBC #/AREA URNS AUTO: 1 /HPF (ref 0–2)

## 2018-01-20 PROCEDURE — G0463 HOSPITAL OUTPT CLINIC VISIT: HCPCS | Mod: 25

## 2018-01-20 PROCEDURE — 81001 URINALYSIS AUTO W/SCOPE: CPT | Performed by: FAMILY MEDICINE

## 2018-01-20 PROCEDURE — 84112 EVAL AMNIOTIC FLUID PROTEIN: CPT | Performed by: FAMILY MEDICINE

## 2018-01-20 PROCEDURE — 59025 FETAL NON-STRESS TEST: CPT

## 2018-01-20 NOTE — DISCHARGE INSTRUCTIONS
OB Triage Discharge Instructions    Diet:  Regular diet as tolerated    Activity:  As tolerated    Other Special Instructions: keep scheduled appointment this week with Amber Crockett    Reason for being seen in L&D: contractions and possible ROM    Treatment/procedures performed in L&D: uterine andfetal monitoring, UA and Amnisure    Call the [facility]place at [phone] if you have:  ? 5 or more contractions in one hour  ? Leaking of fluid from your vaginal area  ? Decreased fetal movement (follow kick count instructions)  ? Bleeding from your vaginal area  ? Swelling in your face, or increased swelling in your hands or legs  ? Headaches or vision problems such as blurring or seeing spots or starts  ? Nausea or vomiting lasting for more than 24 hours  ? An increase in weight (5lbs/week)  ? Epigastric pain (sometimes confused with heartburn that does not go away or a very bad stomach ache)    If you have any questions, please follow up with your doctor.        Patient Signature: ______________________________________________________________  By signing the above I acknowledge that a nurse or my care provider has explained the instruction to me and I have had any questions regarding my care explained to me.        Discharge Nurse Signature: _______________________________ 1/20/2018  9:46 AM    Method of discharge: ambulation    Accompanied by:   Time of discharge: 0945

## 2018-01-20 NOTE — IP AVS SNAPSHOT
MRN:[id number]                      After Visit Summary   1/20/2018    Wyatt Bruno    MRN: [id number]           Thank you!     Thank you for choosing [facility] for your care. Our goal is always to provide you with excellent care. Hearing back from our patients is one way we can continue to improve our services. Please take a few minutes to complete the written survey that you may receive in the mail after you visit with us. Thank you!        Patient Information     Date Of Birth          1990        Designated Caregiver       Most Recent Value    Caregiver    Will someone help with your care after discharge? no      About your hospital stay     You were admitted on:  January 20, 2018 You last received care in the:  [facility]    You were discharged on:  January 20, 2018       Who to Call     For medical emergencies, please call 911.  For non-urgent questions about your medical care, please call your primary care provider or clinic, [phone]          Attending Provider     Provider Specialty    Joel Martinez MD [facility]       Primary Care Provider Office Phone # Fax #    Amber Crockett PA-C [phone]      Your next 10 appointments already scheduled     Jan 25, 2018  6:00 PM CST   ESTABLISHED PRENATAL with Amber Crockett PA-C   [facility] ([address] [address] [phone]            Feb 01, 2018  6:00 PM CST   ESTABLISHED PRENATAL with Amber Crockett PA-C   [facility] ([address] [address] [phone]              Further instructions from your care team                OB Triage Discharge Instructions    Diet:  Regular diet as tolerated    Activity:  As tolerated    Other Special Instructions: keep scheduled appointment this week with Amber Crockett    Reason for being seen in L&D:  contractions and possible ROM    Treatment/procedures performed in L&D: uterine andfetal monitoring, UA and Amnisure    Call the [facility] at [phone] if you have:  ? 5 or more contractions in one hour  ? Leaking of fluid from your vaginal area  ? Decreased fetal movement (follow kick count instructions)  ? Bleeding from your vaginal area  ? Swelling in your face, or increased swelling in your hands or legs  ? Headaches or vision problems such as blurring or seeing spots or starts  ? Nausea or vomiting lasting for more than 24 hours  ? An increase in weight (5lbs/week)  ? Epigastric pain (sometimes confused with heartburn that does not go away or a very bad stomach ache)    If you have any questions, please follow up with your doctor.        Patient Signature: ______________________________________________________________  By signing the above I acknowledge that a nurse or my care provider has explained the instruction to me and I have had any questions regarding my care explained to me.        Discharge Nurse Signature: _______________________________ 1/20/2018  9:46 AM    Method of discharge: ambulation    Accompanied by:   Time of discharge: 0945        Pending Results     No orders found from 1/18/2018 to 1/21/2018.            Admission Information     Date & Time Provider Department Dept. Phone    1/20/2018 Joel Martinez MD [facility] [phone]      Your Vitals Were     Blood Pressure Temperature Respirations Last Period          127/75 97.6  F (36.4  C) (Oral) 18 04/28/2017 (Exact Date)        MyChart Information     [facility] gives you secure access to your electronic health record. If you see a primary care provider, you can also send messages to your care team and make appointments. If you have questions, please call your primary care clinic.  If you do not have a primary care provider, please call [phone] and they will assist you.        Care EveryWhere ID     This  is your Care EveryWhere ID. This could be used by other organizations to access your [facility] medical records  [id number]        Equal Access to Services     LUCRECIA ZAFAR : Hadii laurecne Flores, waericada chuckmarioha, qamassimota kaamarida yolanda, octaviano alvaroin hayaalu keenebryan marlenetcshane westYudith Wilhelm [facility] [phone].    ATENCIÓN: Si habla español, tiene a grimaldo disposición servicios gratuitos de asistencia lingüística. Llame al [phone].    We comply with applicable federal civil rights laws and [place] laws. We do not discriminate on the basis of race, color, national origin, age, disability, sex, sexual orientation, or gender identity.               Review of your medicines      UNREVIEWED medicines. Ask your doctor about these medicines        Dose / Directions    ferrous sulfate 325 (65 FE) MG tablet   Commonly known as:  IRON   Used for:  Iron deficiency anemia, unspecified iron deficiency anemia type        Dose:  325 mg   Take 1 tablet (325 mg) by mouth daily (with breakfast)   Quantity:  90 tablet   Refills:  2       prenatal multivitamin plus iron 27-0.8 MG Tabs per tablet        Dose:  1 tablet   Take 1 tablet by mouth daily   Refills:  0                Protect others around you: Learn how to safely use, store and throw away your medicines at www.disposemymeds.org.             Medication List: This is a list of all your medications and when to take them. Check marks below indicate your daily home schedule. Keep this list as a reference.      Medications           Morning Afternoon Evening Bedtime As Needed    ferrous sulfate 325 (65 FE) MG tablet   Commonly known as:  IRON   Take 1 tablet (325 mg) by mouth daily (with breakfast)                                prenatal multivitamin plus iron 27-0.8 MG Tabs per tablet   Take 1 tablet by mouth daily

## 2018-01-20 NOTE — PROGRESS NOTES
S: Triage Arrival  B: Wyatt is a 27 y.o.  @ 38w 1d who presents with complaint of possible ROM and contractions  A: EFM applied. FHT's 135 with moderate variability, accels present, no decels noted on strip. Contractions not present since admission, although client reports intermittent contractions that kept her up at night and then contractions every 4 minutes this am. UA and Amnisure collected and sent.  present and supportive.    R: Will notify MD to obtain further orders.

## 2018-01-20 NOTE — PROVIDER NOTIFICATION
Awaiting result of amnisure to determine plan for admission or discharge. Received orders for both scenarios.

## 2018-01-20 NOTE — PROGRESS NOTES
S:  Discharge from triage.   A:  FHT's 125, Moderate variability, Accels present, no decels noted. Contractions rare.  Cervical exam not done  R:  Written and verbal D/C instruction provided. Pt. Verbalized understanding of D/C instructions and will follow up with roe Crockett as previously scheduled on Thurs.   Verbalizes understanding that she will call or return to the [facility] with any further questions or concerns.   Pt. D/C'd via ambulation with  at 1001    Nursing Discharge Checklist  Discharge order entered: Yes  Patient care order entered: Yes  Charges entered: Yes  IV start and stop times have been documented in Epic? No  NST start and stop times have been documented in Epic Doc F/S? Yes

## 2018-01-20 NOTE — IP AVS SNAPSHOT
[facility]    [address]     [place] [address]    Phone:  [phone]                                       After Visit Summary   1/20/2018    Wyatt Bruno    MRN: [id number]           After Visit Summary Signature Page     I have received my discharge instructions, and my questions have been answered. I have discussed any challenges I see with this plan with the nurse or doctor.    ..........................................................................................................................................  Patient/Patient Representative Signature      ..........................................................................................................................................  Patient Representative Print Name and Relationship to Patient    ..................................................               ................................................  Date                                            Time    ..........................................................................................................................................  Reviewed by Signature/Title    ...................................................              ..............................................  Date                                                            Time

## 2018-01-25 ENCOUNTER — PRENATAL OFFICE VISIT (OUTPATIENT)
Dept: FAMILY MEDICINE | Facility: CLINIC | Age: 28
End: 2018-01-25
Payer: COMMERCIAL

## 2018-01-25 VITALS
WEIGHT: 231 LBS | DIASTOLIC BLOOD PRESSURE: 80 MMHG | RESPIRATION RATE: 18 BRPM | SYSTOLIC BLOOD PRESSURE: 116 MMHG | BODY MASS INDEX: 38.44 KG/M2 | HEART RATE: 82 BPM | OXYGEN SATURATION: 96 % | TEMPERATURE: 99.2 F

## 2018-01-25 DIAGNOSIS — Z34.80 SUPERVISION OF OTHER NORMAL PREGNANCY, ANTEPARTUM: Primary | ICD-10-CM

## 2018-01-25 PROCEDURE — 99207 ZZC PRENATAL VISIT: CPT | Performed by: PHYSICIAN ASSISTANT

## 2018-01-25 ASSESSMENT — PAIN SCALES - GENERAL: PAINLEVEL: NO PAIN (0)

## 2018-01-25 NOTE — MR AVS SNAPSHOT
After Visit Summary   1/25/2018    Wyatt Bruno    MRN: [id number]           Patient Information     Date Of Birth          1990        Visit Information        Provider Department      1/25/2018 6:00 PM Amber Crockett PA-C [facility]        Today's Diagnoses     Supervision of other normal pregnancy, antepartum    -  1       Follow-ups after your visit        Your next 10 appointments already scheduled     Feb 01, 2018  6:00 PM CST   ESTABLISHED PRENATAL with Amber Crockett PA-C   [facility] ([facility]    [address] [place] [id number] [phone]              Who to contact     If you have questions or need follow up information about today's clinic visit or your schedule please contact [facility] directly at [phone].  Normal or non-critical lab and imaging results will be communicated to you by [employer], letter or phone within 4 business days after the clinic has received the results. If you do not hear from us within 7 days, please contact the clinic through [employer] or phone. If you have a critical or abnormal lab result, we will notify you by phone as soon as possible.  Submit refill requests through [employer] or call your pharmacy and they will forward the refill request to us. Please allow 3 business days for your refill to be completed.          Additional Information About Your Visit        MyChart Information     [employer] gives you secure access to your electronic health record. If you see a primary care provider, you can also send messages to your care team and make appointments. If you have questions, please call your primary care clinic.  If you do not have a primary care provider, please call [phone] and they will assist you.        Care EveryWhere ID     This is your Care EveryWhere ID. This could be used by other organizations to access your [facility] medical records  [id number]         Your Vitals Were     Pulse Temperature Respirations Last Period Pulse Oximetry BMI (Body Mass Index)    82 99.2  F (37.3  C) (Tympanic) 18 04/28/2017 (Exact Date) 96% 38.44 kg/m2       Blood Pressure from Last 3 Encounters:   01/25/18 116/80   01/20/18 127/75   01/18/18 118/80    Weight from Last 3 Encounters:   01/25/18 231 lb (104.8 kg)   01/18/18 230 lb (104.3 kg)   01/11/18 228 lb (103.4 kg)              Today, you had the following     No orders found for display       Primary Care Provider Office Phone # Fax #    Amber Crockett PA-C [phone] [address] [place] [address]        Equal Access to Services     LUCRECIA ZAFAR : Lennie escobar Soginger, waaxda luqadaha, qaybta kaalmada adeegyada, octaviano giles . So [facility] [phone].    ATENCIÓN: Si habla español, tiene a grimaldo disposición servicios gratuitos de asistencia lingüística. Llame al [phone].    We comply with applicable federal civil rights laws and [place] laws. We do not discriminate on the basis of race, color, national origin, age, disability, sex, sexual orientation, or gender identity.            Thank you!     Thank you for choosing [facility]  for your care. Our goal is always to provide you with excellent care. Hearing back from our patients is one way we can continue to improve our services. Please take a few minutes to complete the written survey that you may receive in the mail after your visit with us. Thank you!             Your Updated Medication List - Protect others around you: Learn how to safely use, store and throw away your medicines at www.disposemymeds.org.          This list is accurate as of 1/25/18  6:49 PM.  Always use your most recent med list.                   Brand Name Dispense Instructions for use Diagnosis    ferrous sulfate 325 (65 FE) MG tablet    IRON    90 tablet    Take 1 tablet (325 mg) by mouth daily (with breakfast)    Iron  deficiency anemia, unspecified iron deficiency anemia type       prenatal multivitamin plus iron 27-0.8 MG Tabs per tablet      Take 1 tablet by mouth daily

## 2018-01-25 NOTE — NURSING NOTE
"Chief Complaint   Patient presents with     Prenatal Care       Initial /80  Pulse 82  Temp 99.2  F (37.3  C) (Tympanic)  Resp 18  Wt 231 lb (104.8 kg)  LMP 04/28/2017 (Exact Date)  SpO2 96%  BMI 38.44 kg/m2 Estimated body mass index is 38.44 kg/(m^2) as calculated from the following:    Height as of 1/11/18: 5' 5\" (1.651 m).    Weight as of this encounter: 231 lb (104.8 kg).  BP completed using cuff size: eduar Ferrer MA      "

## 2018-01-26 ENCOUNTER — HOSPITAL ENCOUNTER (OUTPATIENT)
Facility: CLINIC | Age: 28
Discharge: HOME | End: 2018-01-26
Attending: FAMILY MEDICINE | Admitting: FAMILY MEDICINE
Payer: COMMERCIAL

## 2018-01-26 VITALS — DIASTOLIC BLOOD PRESSURE: 76 MMHG | TEMPERATURE: 98.2 F | SYSTOLIC BLOOD PRESSURE: 128 MMHG

## 2018-01-26 PROCEDURE — G0463 HOSPITAL OUTPT CLINIC VISIT: HCPCS

## 2018-01-26 NOTE — IP AVS SNAPSHOT
MRN:[id number]                      After Visit Summary   1/26/2018    Wyatt Bruno    MRN: [id number]           Thank you!     Thank you for choosing [facility] for your care. Our goal is always to provide you with excellent care. Hearing back from our patients is one way we can continue to improve our services. Please take a few minutes to complete the written survey that you may receive in the mail after you visit with us. Thank you!        Patient Information     Date Of Birth          1990        About your hospital stay     You were admitted on:  January 26, 2018 You last received care in the:  [facility]    You were discharged on:  January 26, 2018       Who to Call     For medical emergencies, please call 911.  For non-urgent questions about your medical care, please call your primary care provider or clinic, [phone]          Attending Provider     Provider Specialty    Joel Martinez MD Family [facility]       Primary Care Provider Office Phone # Fax #    Amber Crockett PA-C [phone]      Your next 10 appointments already scheduled     Feb 01, 2018  6:00 PM CST   ESTABLISHED PRENATAL with Amber Crockett PA-C   [facility] ([facility])    [address] [place] [address] [phone]              Further instructions from your care team                OB Triage Discharge Instructions    Diet:  Regular diet as tolerated    Activity:  As tolerated    Other Special Instructions: none    Reason for being seen in L&D: contractions    Treatment/procedures performed in L&D: none    Call the [facility]place at [phone] if you have:  ? 5 or more contractions in one hour  ? Leaking of fluid from your vaginal area  ? Decreased fetal movement (follow kick count instructions)  ? Bleeding from your vaginal area  ? Swelling in your face, or increased swelling in your hands or legs  ? Headaches or vision  problems such as blurring or seeing spots or starts  ? Nausea or vomiting lasting for more than 24 hours  ? An increase in weight (5lbs/week)  ? Epigastric pain (sometimes confused with heartburn that does not go away or a very bad stomach ache)    If you have any questions, please follow up with your doctor.        Patient Signature: ______________________________________________________________  By signing the above I acknowledge that a nurse or my care provider has explained the instruction to me and I have had any questions regarding my care explained to me.        Discharge Nurse Signature: _______________________________ 1/26/2018  9:51 PM    Method of discharge: ambulatory    Accompanied by:   Time of discharge: 2155        Pending Results     No orders found from 1/24/2018 to 1/27/2018.            Admission Information     Date & Time Provider Department Dept. Phone    1/26/2018 [facility] [phone]      Your Vitals Were     Blood Pressure Temperature Last Period             128/76 98.2  F (36.8  C) 04/28/2017 (Exact Date)         MyChart Information     [facility] gives you secure access to your electronic health record. If you see a primary care provider, you can also send messages to your care team and make appointments. If you have questions, please call your primary care clinic.  If you do not have a primary care provider, please call [phone] and they will assist you.        Care EveryWhere ID     This is your Care EveryWhere ID. This could be used by other organizations to access your [facility] medical records  [id number]        Equal Access to Services     LUCRECIA ZAFAR : Lennie Flores, thu raza, octaviano schmidt. [facility] [phone].    ATENCIÓN: Si habla español, tiene a grimaldo disposición servicios gratuitos de asistencia lingüística. Kristian giordano [phone].    We comply with  applicable federal civil rights laws and [place] laws. We do not discriminate on the basis of race, color, national origin, age, disability, sex, sexual orientation, or gender identity.               Review of your medicines      UNREVIEWED medicines. Ask your doctor about these medicines        Dose / Directions    ferrous sulfate 325 (65 FE) MG tablet   Commonly known as:  IRON   Used for:  Iron deficiency anemia, unspecified iron deficiency anemia type        Dose:  325 mg   Take 1 tablet (325 mg) by mouth daily (with breakfast)   Quantity:  90 tablet   Refills:  2       prenatal multivitamin plus iron 27-0.8 MG Tabs per tablet        Dose:  1 tablet   Take 1 tablet by mouth daily   Refills:  0                Protect others around you: Learn how to safely use, store and throw away your medicines at www.disposemymeds.org.             Medication List: This is a list of all your medications and when to take them. Check marks below indicate your daily home schedule. Keep this list as a reference.      Medications           Morning Afternoon Evening Bedtime As Needed    ferrous sulfate 325 (65 FE) MG tablet   Commonly known as:  IRON   Take 1 tablet (325 mg) by mouth daily (with breakfast)                                prenatal multivitamin plus iron 27-0.8 MG Tabs per tablet   Take 1 tablet by mouth daily

## 2018-01-26 NOTE — PROGRESS NOTES
Doing well.  No concerns today. Continues to take PNV.  No vaginal bleeding, leakage, but is noting some contractions. Irregular. Did go to OB on 1/20 - no changes in cervix although had been nahum every 3 minutes which then stopped when she was being monitored.  Baby's movement is active and frequent.  Discussed signs of labor and when to call or come in.   Discussed kick counts and fetal movement.  Please call if persistent HA, blurred vision, or swelling  She will report to OB if contractions every 5-10 minutes or if rupture of membranes.  RTC in 1 week    Electronically signed by Amber Crockett PA-C  1/25/2018

## 2018-01-26 NOTE — IP AVS SNAPSHOT
[facility]    [address]     [place] [address]    Phone:  [phone]                                       After Visit Summary   1/26/2018    Wyatt Bruno    MRN: [id number]           After Visit Summary Signature Page     I have received my discharge instructions, and my questions have been answered. I have discussed any challenges I see with this plan with the nurse or doctor.    ..........................................................................................................................................  Patient/Patient Representative Signature      ..........................................................................................................................................  Patient Representative Print Name and Relationship to Patient    ..................................................               ................................................  Date                                            Time    ..........................................................................................................................................  Reviewed by Signature/Title    ...................................................              ..............................................  Date                                                            Time

## 2018-01-27 NOTE — PROGRESS NOTES
S: Triage Arrival  B: Wyatt is a 27 y.o.  @ 39w 0d who presents with complaint of contractions that started just before 6PM and have gotten progressively closer together and more intensse.  A: EFM applied. FHT's 120's with moderate variability, accels present, no decels noted on strip. Contractions not seen as of yet. Just put on monitor. She reports them being 6-7 minutes apart when she phoned the [facility]place and 3-4 minutes apart on arrival.  R:  Will notify MD with UA results and obtain further orders.

## 2018-01-27 NOTE — PROGRESS NOTES
"Dr. Gallardo notified of patient arrival. No contractions on monitor since she got here and patient reports she is \"only feeling little ones\". Cervix unchanged from yesterdays exam in clinic. FHT's 120's with moderate variability. Lots of accels present. No decels. Offered patient to walk around for a while, but she chooses to just go home. Ok to discharge patient  "

## 2018-01-27 NOTE — PROGRESS NOTES
S:  Discharge from triage.   A:  FHT's 120, Moderate variability, Accels present, no decels noted. Contractions 0.  Cervical exam 3/50/-2. Unchanged from exam in clinic yesterday  R:  Written and verbal D/C instruction provided. Pt. Verbalized understanding of D/C instructions and will follow up with GHULAM Crockett as previously scheduled.   Verbalizes understanding that she will call or return to the [facility] with any further questions or concerns.   Pt. D/C'd via ambulatory with     Nursing Discharge Checklist  Discharge order entered: Yes  Patient care order entered: Yes  Charges entered: Yes  IV start and stop times have been documented in Epic? na  NST start and stop times have been documented in Epic Doc F/S? na

## 2018-01-27 NOTE — DISCHARGE INSTRUCTIONS
OB Triage Discharge Instructions    Diet:  Regular diet as tolerated    Activity:  As tolerated    Other Special Instructions: none    Reason for being seen in L&D: contractions    Treatment/procedures performed in L&D: none    Call the [facility] at [phone] if you have:  ? 5 or more contractions in one hour  ? Leaking of fluid from your vaginal area  ? Decreased fetal movement (follow kick count instructions)  ? Bleeding from your vaginal area  ? Swelling in your face, or increased swelling in your hands or legs  ? Headaches or vision problems such as blurring or seeing spots or starts  ? Nausea or vomiting lasting for more than 24 hours  ? An increase in weight (5lbs/week)  ? Epigastric pain (sometimes confused with heartburn that does not go away or a very bad stomach ache)    If you have any questions, please follow up with your doctor.        Patient Signature: ______________________________________________________________  By signing the above I acknowledge that a nurse or my care provider has explained the instruction to me and I have had any questions regarding my care explained to me.        Discharge Nurse Signature: _______________________________ 1/26/2018  9:51 PM    Method of discharge: ambulatory    Accompanied by:   Time of discharge: 2155

## 2018-01-28 ENCOUNTER — HOSPITAL ENCOUNTER (INPATIENT)
Facility: CLINIC | Age: 28
LOS: 2 days | Discharge: HOME | End: 2018-01-30
Attending: FAMILY MEDICINE | Admitting: FAMILY MEDICINE
Payer: COMMERCIAL

## 2018-01-28 ENCOUNTER — ANESTHESIA EVENT (OUTPATIENT)
Dept: OBGYN | Facility: CLINIC | Age: 28
End: 2018-01-28
Payer: COMMERCIAL

## 2018-01-28 ENCOUNTER — ANESTHESIA (OUTPATIENT)
Dept: OBGYN | Facility: CLINIC | Age: 28
End: 2018-01-28
Payer: COMMERCIAL

## 2018-01-28 DIAGNOSIS — Z37.9 NORMAL LABOR: Primary | ICD-10-CM

## 2018-01-28 PROBLEM — O42.90 LEAKAGE, AMNIOTIC FLUID: Status: ACTIVE | Noted: 2018-01-28

## 2018-01-28 LAB — A1 MICROGLOB PLACENTAL VAG QL: POSITIVE

## 2018-01-28 PROCEDURE — 12000031 ZZH R&B OB CRITICAL

## 2018-01-28 PROCEDURE — 00HU33Z INSERTION OF INFUSION DEVICE INTO SPINAL CANAL, PERCUTANEOUS APPROACH: ICD-10-PCS | Performed by: NURSE ANESTHETIST, CERTIFIED REGISTERED

## 2018-01-28 PROCEDURE — 25000128 H RX IP 250 OP 636: Performed by: FAMILY MEDICINE

## 2018-01-28 PROCEDURE — 40000671 ZZH STATISTIC ANESTHESIA CASE

## 2018-01-28 PROCEDURE — 37000011 ZZH ANESTHESIA WARD SERVICE: Performed by: NURSE ANESTHETIST, CERTIFIED REGISTERED

## 2018-01-28 PROCEDURE — 86900 BLOOD TYPING SEROLOGIC ABO: CPT | Performed by: FAMILY MEDICINE

## 2018-01-28 PROCEDURE — G0463 HOSPITAL OUTPT CLINIC VISIT: HCPCS | Mod: 25

## 2018-01-28 PROCEDURE — 84112 EVAL AMNIOTIC FLUID PROTEIN: CPT | Performed by: FAMILY MEDICINE

## 2018-01-28 PROCEDURE — 25000128 H RX IP 250 OP 636: Performed by: NURSE ANESTHETIST, CERTIFIED REGISTERED

## 2018-01-28 PROCEDURE — 86901 BLOOD TYPING SEROLOGIC RH(D): CPT | Performed by: FAMILY MEDICINE

## 2018-01-28 PROCEDURE — 86780 TREPONEMA PALLIDUM: CPT | Performed by: FAMILY MEDICINE

## 2018-01-28 PROCEDURE — 3E0R3BZ INTRODUCTION OF ANESTHETIC AGENT INTO SPINAL CANAL, PERCUTANEOUS APPROACH: ICD-10-PCS | Performed by: NURSE ANESTHETIST, CERTIFIED REGISTERED

## 2018-01-28 PROCEDURE — 25000125 ZZHC RX 250: Performed by: FAMILY MEDICINE

## 2018-01-28 RX ORDER — SODIUM CHLORIDE, SODIUM LACTATE, POTASSIUM CHLORIDE, CALCIUM CHLORIDE 600; 310; 30; 20 MG/100ML; MG/100ML; MG/100ML; MG/100ML
INJECTION, SOLUTION INTRAVENOUS CONTINUOUS
Status: DISCONTINUED | OUTPATIENT
Start: 2018-01-28 | End: 2018-01-29

## 2018-01-28 RX ORDER — LIDOCAINE 40 MG/G
CREAM TOPICAL
Status: DISCONTINUED | OUTPATIENT
Start: 2018-01-28 | End: 2018-01-29

## 2018-01-28 RX ORDER — FENTANYL CITRATE 50 UG/ML
50-100 INJECTION, SOLUTION INTRAMUSCULAR; INTRAVENOUS
Status: DISCONTINUED | OUTPATIENT
Start: 2018-01-28 | End: 2018-01-29

## 2018-01-28 RX ORDER — METHYLERGONOVINE MALEATE 0.2 MG/ML
200 INJECTION INTRAVENOUS
Status: DISCONTINUED | OUTPATIENT
Start: 2018-01-28 | End: 2018-01-29

## 2018-01-28 RX ORDER — OXYTOCIN/0.9 % SODIUM CHLORIDE 30/500 ML
100-340 PLASTIC BAG, INJECTION (ML) INTRAVENOUS CONTINUOUS PRN
Status: DISCONTINUED | OUTPATIENT
Start: 2018-01-28 | End: 2018-01-29

## 2018-01-28 RX ORDER — NALBUPHINE HYDROCHLORIDE 10 MG/ML
2.5-5 INJECTION, SOLUTION INTRAMUSCULAR; INTRAVENOUS; SUBCUTANEOUS EVERY 6 HOURS PRN
Status: DISCONTINUED | OUTPATIENT
Start: 2018-01-28 | End: 2018-01-29

## 2018-01-28 RX ORDER — OXYCODONE AND ACETAMINOPHEN 5; 325 MG/1; MG/1
1 TABLET ORAL
Status: DISCONTINUED | OUTPATIENT
Start: 2018-01-28 | End: 2018-01-29

## 2018-01-28 RX ORDER — IBUPROFEN 800 MG/1
800 TABLET, FILM COATED ORAL
Status: DISCONTINUED | OUTPATIENT
Start: 2018-01-28 | End: 2018-01-29

## 2018-01-28 RX ORDER — CARBOPROST TROMETHAMINE 250 UG/ML
250 INJECTION, SOLUTION INTRAMUSCULAR
Status: DISCONTINUED | OUTPATIENT
Start: 2018-01-28 | End: 2018-01-29

## 2018-01-28 RX ORDER — NALOXONE HYDROCHLORIDE 0.4 MG/ML
.1-.4 INJECTION, SOLUTION INTRAMUSCULAR; INTRAVENOUS; SUBCUTANEOUS
Status: DISCONTINUED | OUTPATIENT
Start: 2018-01-28 | End: 2018-01-28

## 2018-01-28 RX ORDER — ACETAMINOPHEN 325 MG/1
650 TABLET ORAL EVERY 4 HOURS PRN
Status: DISCONTINUED | OUTPATIENT
Start: 2018-01-28 | End: 2018-01-29

## 2018-01-28 RX ORDER — OXYTOCIN 10 [USP'U]/ML
10 INJECTION, SOLUTION INTRAMUSCULAR; INTRAVENOUS
Status: DISCONTINUED | OUTPATIENT
Start: 2018-01-28 | End: 2018-01-29

## 2018-01-28 RX ORDER — LIDOCAINE HYDROCHLORIDE AND EPINEPHRINE 15; 5 MG/ML; UG/ML
INJECTION, SOLUTION EPIDURAL PRN
Status: DISCONTINUED | OUTPATIENT
Start: 2018-01-28 | End: 2018-01-30

## 2018-01-28 RX ORDER — NALOXONE HYDROCHLORIDE 0.4 MG/ML
.1-.4 INJECTION, SOLUTION INTRAMUSCULAR; INTRAVENOUS; SUBCUTANEOUS
Status: DISCONTINUED | OUTPATIENT
Start: 2018-01-28 | End: 2018-01-29

## 2018-01-28 RX ORDER — BUPIVACAINE HYDROCHLORIDE 2.5 MG/ML
INJECTION, SOLUTION INFILTRATION; PERINEURAL PRN
Status: DISCONTINUED | OUTPATIENT
Start: 2018-01-28 | End: 2018-01-30

## 2018-01-28 RX ORDER — EPHEDRINE SULFATE 50 MG/ML
5 INJECTION, SOLUTION INTRAMUSCULAR; INTRAVENOUS; SUBCUTANEOUS
Status: DISCONTINUED | OUTPATIENT
Start: 2018-01-28 | End: 2018-01-29

## 2018-01-28 RX ORDER — OXYTOCIN/0.9 % SODIUM CHLORIDE 30/500 ML
1-24 PLASTIC BAG, INJECTION (ML) INTRAVENOUS CONTINUOUS
Status: DISCONTINUED | OUTPATIENT
Start: 2018-01-28 | End: 2018-01-29

## 2018-01-28 RX ORDER — ONDANSETRON 2 MG/ML
4 INJECTION INTRAMUSCULAR; INTRAVENOUS EVERY 6 HOURS PRN
Status: DISCONTINUED | OUTPATIENT
Start: 2018-01-28 | End: 2018-01-29

## 2018-01-28 RX ORDER — ONDANSETRON 2 MG/ML
4 INJECTION INTRAMUSCULAR; INTRAVENOUS EVERY 6 HOURS PRN
Status: DISCONTINUED | OUTPATIENT
Start: 2018-01-28 | End: 2018-01-28

## 2018-01-28 RX ADMIN — Medication 10 ML/HR: at 23:35

## 2018-01-28 RX ADMIN — SODIUM CHLORIDE, POTASSIUM CHLORIDE, SODIUM LACTATE AND CALCIUM CHLORIDE 500 ML: 600; 310; 30; 20 INJECTION, SOLUTION INTRAVENOUS at 22:59

## 2018-01-28 RX ADMIN — BUPIVACAINE HYDROCHLORIDE 5 ML: 2.5 INJECTION, SOLUTION EPIDURAL; INFILTRATION; INTRACAUDAL; PERINEURAL at 23:23

## 2018-01-28 RX ADMIN — Medication 10 ML/HR: at 23:26

## 2018-01-28 RX ADMIN — LIDOCAINE HYDROCHLORIDE,EPINEPHRINE BITARTRATE 3 ML: 15; .005 INJECTION, SOLUTION EPIDURAL; INFILTRATION; INTRACAUDAL; PERINEURAL at 23:19

## 2018-01-28 RX ADMIN — ONDANSETRON 4 MG: 2 INJECTION INTRAMUSCULAR; INTRAVENOUS at 23:30

## 2018-01-28 RX ADMIN — SODIUM CHLORIDE, POTASSIUM CHLORIDE, SODIUM LACTATE AND CALCIUM CHLORIDE 1000 ML: 600; 310; 30; 20 INJECTION, SOLUTION INTRAVENOUS at 16:37

## 2018-01-28 RX ADMIN — OXYTOCIN-SODIUM CHLORIDE 0.9% IV SOLN 30 UNIT/500ML 2 MILLI-UNITS/MIN: 30-0.9/5 SOLUTION at 16:38

## 2018-01-28 RX ADMIN — FENTANYL CITRATE 50 MCG: 50 INJECTION, SOLUTION INTRAMUSCULAR; INTRAVENOUS at 22:07

## 2018-01-28 RX ADMIN — BUPIVACAINE HYDROCHLORIDE 5 ML: 2.5 INJECTION, SOLUTION EPIDURAL; INFILTRATION; INTRACAUDAL; PERINEURAL at 23:28

## 2018-01-28 NOTE — PROGRESS NOTES
S: Triage Arrival  B: Wyatt is a 27 y.o.  @ 39w 2d who presents with complaint of gush of fluid vaginally at approx 1340 today  A: EFM applied. FHT's140 with moderate variability, accels present, no decels noted on strip. Contractions rare  R: Will notify MD to obtain further orders.

## 2018-01-28 NOTE — IP AVS SNAPSHOT
[facility]    [address]     [place] [address]    Phone:  [phone]                                       After Visit Summary   1/28/2018    Wyatt Bruno    MRN: [id number]           After Visit Summary Signature Page     I have received my discharge instructions, and my questions have been answered. I have discussed any challenges I see with this plan with the nurse or doctor.    ..........................................................................................................................................  Patient/Patient Representative Signature      ..........................................................................................................................................  Patient Representative Print Name and Relationship to Patient    ..................................................               ................................................  Date                                            Time    ..........................................................................................................................................  Reviewed by Signature/Title    ...................................................              ..............................................  Date                                                            Time

## 2018-01-28 NOTE — PROGRESS NOTES
S:  Admission  B:  Wyatt is a  @ 39w2d gestation, GBS -, Hep. B -, pregnancy uncomplicated. EFW 7lb 8 oz per MD  A:  Patient admitted to room 332 for labor  R: possible epidural.  Attempted to contact Dr Martinez.  See orders

## 2018-01-28 NOTE — PROGRESS NOTES
Rare contractions.  Pt would like to walk for 1 hour before starting pitocin.  Report to the next shift. Pt was 3 cm at previous visit.

## 2018-01-29 LAB — T PALLIDUM IGG+IGM SER QL: NEGATIVE

## 2018-01-29 PROCEDURE — 25000132 ZZH RX MED GY IP 250 OP 250 PS 637: Performed by: FAMILY MEDICINE

## 2018-01-29 PROCEDURE — 25000128 H RX IP 250 OP 636: Performed by: NURSE ANESTHETIST, CERTIFIED REGISTERED

## 2018-01-29 PROCEDURE — 10907ZC DRAINAGE OF AMNIOTIC FLUID, THERAPEUTIC FROM PRODUCTS OF CONCEPTION, VIA NATURAL OR ARTIFICIAL OPENING: ICD-10-PCS | Performed by: FAMILY MEDICINE

## 2018-01-29 PROCEDURE — 25000128 H RX IP 250 OP 636: Performed by: FAMILY MEDICINE

## 2018-01-29 PROCEDURE — 72200001 ZZH LABOR CARE VAGINAL DELIVERY SINGLE

## 2018-01-29 PROCEDURE — 12000031 ZZH R&B OB CRITICAL

## 2018-01-29 PROCEDURE — 25000125 ZZHC RX 250: Performed by: NURSE ANESTHETIST, CERTIFIED REGISTERED

## 2018-01-29 PROCEDURE — 59400 OBSTETRICAL CARE: CPT | Performed by: FAMILY MEDICINE

## 2018-01-29 RX ORDER — MISOPROSTOL 200 UG/1
800 TABLET ORAL
Status: DISCONTINUED | OUTPATIENT
Start: 2018-01-29 | End: 2018-01-30 | Stop reason: HOSPADM

## 2018-01-29 RX ORDER — IBUPROFEN 800 MG/1
800 TABLET, FILM COATED ORAL EVERY 6 HOURS PRN
Status: DISCONTINUED | OUTPATIENT
Start: 2018-01-29 | End: 2018-01-30 | Stop reason: HOSPADM

## 2018-01-29 RX ORDER — LIDOCAINE HYDROCHLORIDE 10 MG/ML
INJECTION, SOLUTION INFILTRATION; PERINEURAL
Status: DISCONTINUED
Start: 2018-01-29 | End: 2018-01-29 | Stop reason: HOSPADM

## 2018-01-29 RX ORDER — AMOXICILLIN 250 MG
1 CAPSULE ORAL 2 TIMES DAILY PRN
Status: DISCONTINUED | OUTPATIENT
Start: 2018-01-29 | End: 2018-01-30 | Stop reason: HOSPADM

## 2018-01-29 RX ORDER — CEFAZOLIN SODIUM 2 G/100ML
2 INJECTION, SOLUTION INTRAVENOUS ONCE
Status: COMPLETED | OUTPATIENT
Start: 2018-01-29 | End: 2018-01-29

## 2018-01-29 RX ORDER — HYDROCODONE BITARTRATE AND ACETAMINOPHEN 7.5; 325 MG/1; MG/1
1 TABLET ORAL EVERY 4 HOURS PRN
Status: DISCONTINUED | OUTPATIENT
Start: 2018-01-29 | End: 2018-01-30 | Stop reason: HOSPADM

## 2018-01-29 RX ORDER — LANOLIN 100 %
OINTMENT (GRAM) TOPICAL
Status: DISCONTINUED | OUTPATIENT
Start: 2018-01-29 | End: 2018-01-30 | Stop reason: HOSPADM

## 2018-01-29 RX ORDER — BISACODYL 10 MG
10 SUPPOSITORY, RECTAL RECTAL DAILY PRN
Status: DISCONTINUED | OUTPATIENT
Start: 2018-01-31 | End: 2018-01-30 | Stop reason: HOSPADM

## 2018-01-29 RX ORDER — NALOXONE HYDROCHLORIDE 0.4 MG/ML
.1-.4 INJECTION, SOLUTION INTRAMUSCULAR; INTRAVENOUS; SUBCUTANEOUS
Status: DISCONTINUED | OUTPATIENT
Start: 2018-01-29 | End: 2018-01-30 | Stop reason: HOSPADM

## 2018-01-29 RX ORDER — OXYTOCIN 10 [USP'U]/ML
10 INJECTION, SOLUTION INTRAMUSCULAR; INTRAVENOUS
Status: DISCONTINUED | OUTPATIENT
Start: 2018-01-29 | End: 2018-01-30 | Stop reason: HOSPADM

## 2018-01-29 RX ORDER — AMOXICILLIN 250 MG
2 CAPSULE ORAL 2 TIMES DAILY PRN
Status: DISCONTINUED | OUTPATIENT
Start: 2018-01-29 | End: 2018-01-30 | Stop reason: HOSPADM

## 2018-01-29 RX ORDER — ACETAMINOPHEN 325 MG/1
650 TABLET ORAL EVERY 4 HOURS PRN
Status: DISCONTINUED | OUTPATIENT
Start: 2018-01-29 | End: 2018-01-30 | Stop reason: HOSPADM

## 2018-01-29 RX ORDER — OXYTOCIN/0.9 % SODIUM CHLORIDE 30/500 ML
340 PLASTIC BAG, INJECTION (ML) INTRAVENOUS CONTINUOUS PRN
Status: DISCONTINUED | OUTPATIENT
Start: 2018-01-29 | End: 2018-01-30 | Stop reason: HOSPADM

## 2018-01-29 RX ORDER — OXYTOCIN/0.9 % SODIUM CHLORIDE 30/500 ML
100 PLASTIC BAG, INJECTION (ML) INTRAVENOUS CONTINUOUS
Status: DISCONTINUED | OUTPATIENT
Start: 2018-01-29 | End: 2018-01-30 | Stop reason: HOSPADM

## 2018-01-29 RX ORDER — FENTANYL CITRATE 50 UG/ML
INJECTION, SOLUTION INTRAMUSCULAR; INTRAVENOUS PRN
Status: DISCONTINUED | OUTPATIENT
Start: 2018-01-29 | End: 2018-01-30

## 2018-01-29 RX ORDER — HYDROCORTISONE 2.5 %
CREAM (GRAM) TOPICAL 3 TIMES DAILY PRN
Status: DISCONTINUED | OUTPATIENT
Start: 2018-01-29 | End: 2018-01-30 | Stop reason: HOSPADM

## 2018-01-29 RX ADMIN — Medication 1.5 ML: at 06:06

## 2018-01-29 RX ADMIN — SENNOSIDES AND DOCUSATE SODIUM 1 TABLET: 8.6; 5 TABLET ORAL at 21:05

## 2018-01-29 RX ADMIN — BUPIVACAINE HYDROCHLORIDE 1 ML: 2.5 INJECTION, SOLUTION EPIDURAL; INFILTRATION; INTRACAUDAL; PERINEURAL at 09:15

## 2018-01-29 RX ADMIN — Medication: at 17:01

## 2018-01-29 RX ADMIN — Medication 1.5 ML: at 04:05

## 2018-01-29 RX ADMIN — Medication 2 ML: at 07:31

## 2018-01-29 RX ADMIN — HYDROCODONE BITARTRATE AND ACETAMINOPHEN 1 TABLET: 7.5; 325 TABLET ORAL at 20:28

## 2018-01-29 RX ADMIN — FENTANYL CITRATE 25 MCG: 50 INJECTION, SOLUTION INTRAMUSCULAR; INTRAVENOUS at 12:54

## 2018-01-29 RX ADMIN — SODIUM CHLORIDE, POTASSIUM CHLORIDE, SODIUM LACTATE AND CALCIUM CHLORIDE: 600; 310; 30; 20 INJECTION, SOLUTION INTRAVENOUS at 01:08

## 2018-01-29 RX ADMIN — FENTANYL CITRATE 25 MCG: 50 INJECTION, SOLUTION INTRAMUSCULAR; INTRAVENOUS at 09:15

## 2018-01-29 RX ADMIN — SODIUM CHLORIDE, POTASSIUM CHLORIDE, SODIUM LACTATE AND CALCIUM CHLORIDE: 600; 310; 30; 20 INJECTION, SOLUTION INTRAVENOUS at 05:44

## 2018-01-29 RX ADMIN — IBUPROFEN 800 MG: 800 TABLET ORAL at 15:00

## 2018-01-29 RX ADMIN — Medication 2 ML: at 11:46

## 2018-01-29 RX ADMIN — IBUPROFEN 800 MG: 800 TABLET ORAL at 21:05

## 2018-01-29 RX ADMIN — FENTANYL CITRATE 25 MCG: 50 INJECTION, SOLUTION INTRAMUSCULAR; INTRAVENOUS at 07:31

## 2018-01-29 RX ADMIN — FENTANYL CITRATE 25 MCG: 50 INJECTION, SOLUTION INTRAMUSCULAR; INTRAVENOUS at 06:00

## 2018-01-29 RX ADMIN — FENTANYL CITRATE 25 MCG: 50 INJECTION, SOLUTION INTRAMUSCULAR; INTRAVENOUS at 11:46

## 2018-01-29 RX ADMIN — BUPIVACAINE HYDROCHLORIDE 6 ML: 2.5 INJECTION, SOLUTION EPIDURAL; INFILTRATION; INTRACAUDAL; PERINEURAL at 01:41

## 2018-01-29 RX ADMIN — SODIUM CHLORIDE, POTASSIUM CHLORIDE, SODIUM LACTATE AND CALCIUM CHLORIDE: 600; 310; 30; 20 INJECTION, SOLUTION INTRAVENOUS at 11:45

## 2018-01-29 RX ADMIN — CEFAZOLIN SODIUM 2 G: 2 INJECTION, SOLUTION INTRAVENOUS at 11:48

## 2018-01-29 RX ADMIN — FENTANYL CITRATE 25 MCG: 50 INJECTION, SOLUTION INTRAMUSCULAR; INTRAVENOUS at 04:05

## 2018-01-29 RX ADMIN — Medication 2 ML: at 10:31

## 2018-01-29 RX ADMIN — Medication 1 ML: at 12:54

## 2018-01-29 RX ADMIN — FENTANYL CITRATE 25 MCG: 50 INJECTION, SOLUTION INTRAMUSCULAR; INTRAVENOUS at 10:31

## 2018-01-29 RX ADMIN — LIDOCAINE HYDROCHLORIDE,EPINEPHRINE BITARTRATE 2 ML: 15; .005 INJECTION, SOLUTION EPIDURAL; INFILTRATION; INTRACAUDAL; PERINEURAL at 02:17

## 2018-01-29 NOTE — PROGRESS NOTES
[facility] Labor and Delivery Progress Note    Wyatt Bruno MRN# [id number]   Age: 27 year old YOB: 1990           Subjective:   Patient is comfortable after receiving her intrathecal bolus. No concerns at this time.           Objective:   Patient Vitals for the past 8 hrs:   BP Temp Temp src SpO2   01/29/18 0608 107/68 - - -   01/29/18 0606 113/69 - - -   01/29/18 0604 123/72 - - -   01/29/18 0537 119/70 - - -   01/29/18 0527 - 98.9  F (37.2  C) Oral -   01/29/18 0506 120/55 - - -   01/29/18 0436 115/56 - - -   01/29/18 0407 112/67 - - -   01/29/18 0406 112/67 - - -   01/29/18 0404 113/68 - - -   01/29/18 0403 123/73 - - -   01/29/18 0402 120/74 - - -   01/29/18 0318 117/56 - - -   01/29/18 0247 129/72 - - 100 %   01/29/18 0245 123/67 - - 100 %   01/29/18 0243 127/67 - - -   01/29/18 0241 133/72 - - -   01/29/18 0239 132/74 - - -   01/29/18 0238 125/73 - - -   01/29/18 0235 96/58 - - (!) 89 %   01/29/18 0233 104/60 - - -   01/29/18 0232 103/62 - - -   01/29/18 0229 103/66 - - -   01/29/18 0227 94/57 - - -   01/29/18 0225 (!) 87/53 - - -   01/29/18 0223 104/57 - - -   01/29/18 0220 138/67 - - -   01/29/18 0148 128/80 - - -   01/29/18 0145 128/80 - - -   01/29/18 0140 120/74 - - -   01/29/18 0130 - 98  F (36.7  C) Oral -   01/29/18 0115 114/66 - - -   01/29/18 0050 105/63 - - -   01/29/18 0015 120/68 - - -   01/28/18 2352 130/81 - - 98 %   01/28/18 2348 130/81 - - -   01/28/18 2347 130/81 - - -   01/28/18 2345 126/77 - - -   01/28/18 2343 127/72 - - -   01/28/18 2341 124/66 - - -   01/28/18 2339 124/66 - - -   01/28/18 2337 118/61 - - -   01/28/18 2335 118/61 - - -   01/28/18 2333 124/64 - - -   01/28/18 2331 125/69 - - -   01/28/18 2329 122/66 - - -   01/28/18 2327 132/70 - - -   01/28/18 2324 127/74 - - -   01/28/18 2323 124/69 - - -   01/28/18 2321 131/86 - - -   01/28/18 2319 130/87 - - -   01/28/18 2317 127/86 - - -   01/28/18 2315 139/87 - - -   01/28/18 2313 146/89 - - -   01/28/18 2311  127/86 - - -   18 2310 (!) 164/101 - - -   18 2309 150/87 - - -   18 2307 (!) 142/97 - - -   18 2305 (!) 141/95 - - -        Cervical Exam: 1      Position: Anterior    Membranes:  (feel another bag of water)     Fetal Heart Rate:    Monitor: External US    Variability: Moderate    Baseline Rate: 155 bpm    Fetal Heart Rate Tracing: FHR has been intermittently in the 180's but dip down to 150-160 regularly. Variability present, no decelerations.           Assessment:   Wyatt Bruno is a 27 year old  who is 39w3d here with SROM and augmentation of labor. She is doing well with contractions, getting intermittent intrathecal boluses for her pain about every 2 hours.           Plan:   Pain medication intrathecal boluses  Anticipate   Labor augmentation with Pitocin  Active labor management   Watch for any elevation in maternal temp and consider antibiotics       This document serves as a record of the services and decisions personally performed by Joel Martinez MD. It was created on his behalf by Gab Hutson, a trained medical student. The creation of this record is based on the provider's observations and the statements of the patient.      Gab Hutson, MS3  2018       I agree with the PFSH and ROS as completed by the MS, .  The remainder of the encounter was performed by me and scribed by the MS.  The scribed note accurately reflects my personal services and the decisions made by me.       Joel Martinez MD

## 2018-01-29 NOTE — ANESTHESIA PREPROCEDURE EVALUATION
Anesthesia Evaluation     . Pt has had prior anesthetic. Type: General and MAC           ROS/MED HX    ENT/Pulmonary:  - neg pulmonary ROS     Neurologic:  - neg neurologic ROS     Cardiovascular:     (+) Dyslipidemia, ----. : . . . :. .       METS/Exercise Tolerance:     Hematologic:  - neg hematologic  ROS       Musculoskeletal:  - neg musculoskeletal ROS       GI/Hepatic:     (+) GERD Asymptomatic on medication,       Renal/Genitourinary:  - ROS Renal section negative   (+) Pt has no history of transplant,       Endo:     (+) Other Endocrine Disorder IBS.      Psychiatric:  - neg psychiatric ROS       Infectious Disease:  - neg infectious disease ROS       Malignancy:      - no malignancy   Other:    (+) Possibly pregnant C-spine cleared: N/A, no H/O Chronic Pain,                   Physical Exam  Normal systems: cardiovascular, pulmonary and dental    Airway   Mallampati: II  TM distance: >3 FB  Neck ROM: full    Dental     Cardiovascular   Rhythm and rate: regular and normal      Pulmonary    breath sounds clear to auscultation                    Anesthesia Plan      History & Physical Review  History and physical reviewed and following examination; no interval change.    ASA Status:  2 .    NPO Status:  > 8 hours    Plan for Epidural          Postoperative Care      Consents  Anesthetic plan, risks, benefits and alternatives discussed with:  Patient..                          .

## 2018-01-29 NOTE — PROGRESS NOTES
Per anesthesia, patient now has an intrathecal. Epidural pump stopped. Patient is comfortable and is going to try to sleep now. Cervix 6/95/-1. FHT's 140's with moderate variability. Accels present. No decels. Contractions every 1.5-3

## 2018-01-29 NOTE — PROGRESS NOTES
S: MD update  B:  here for activelabor at 39w3d gestation, 19 hours post SROM,  A: Patient is now 5.5cm by SVE.  with moderate variability. Dr. MUSTAFA given update. Progressing minimall with labor and pain is managed ITN rebolused at 0732., VSS, O2 started per 3ll/hour per NC for O2 sats 85-89 % after the ITN.  R: Will reexamine in the next hour or two.

## 2018-01-29 NOTE — PROGRESS NOTES
Labor and Delivery Progress Note    Wyatt Bruno MRN# [id number]   Age: 27 year old YOB: 1990           Subjective:   Patient is doing well, stable through contractions. No complaints at this time.           Objective:   Patient Vitals for the past 8 hrs:   BP Temp Temp src Pulse Resp SpO2   18 1037 122/70 - - 95 18 95 %   18 1033 111/58 - - 102 18 95 %   18 1032 128/69 - - 109 18 97 %   18 1028 124/61 - - 101 18 99 %   18 0950 118/70 - - 99 18 97 %   18 0918 127/78 - - 109 18 95 %   18 0912 128/71 - - 87 18 96 %   18 0910 126/79 - - 100 - 98 %   18 0908 126/74 - - 100 18 96 %   18 0906 127/82 - - 94 20 98 %   18 0900 - 99.1  F (37.3  C) - - - -   18 0845 119/70 - - 96 18 97 %   18 0816 128/74 - - 91 18 99 %   18 0749 - - - 98 18 (!) 85 %   18 0738 100/58 - - 114 18 91 %   18 0735 122/73 - - 107 18 90 %   18 0733 122/73 - - 110 - 94 %   18 0726 124/73 98.2  F (36.8  C) Oral 101 20 98 %   18 0614 115/77 - - - - -   18 0612 118/73 - - - - -   18 0609 107/67 - - - - -   18 0608 107/68 - - - - -   18 0606 113/69 - - - - -   18 0604 123/72 - - - - -   18 0537 119/70 - - - - -   18 0527 - 98.9  F (37.2  C) Oral - - -   18 0506 120/55 - - - - -   18 0436 115/56 - - - - -   18 0407 112/67 - - - - -   18 0406 112/67 - - - - -   18 0404 113/68 - - - - -   18 0403 123/73 - - - - -   18 0402 120/74 - - - - -        Cervical Exam:  /       Position: Anterior    Membranes:  SROM at 1300 on , AROM at 11:20     Fetal Heart Rate:    Monitor: External US    Variability: Moderate    Baseline Rate: 150 bpm    Fetal Heart Rate Tracing: FHR baseline 150 bpm, reactive and reassuring, no decelerations           Assessment:   Wyatt Bruno is a 27 year old  who is 39w3d here with SROM at  1300 2018 and augmentation of labor. She is progressing slowly with pitocin. IUPC catheter placed for more accurate monitoring of contractions. She still had an intact hind bag which was ruptured at 11:20 this morning with clear odorless fluid. Given prolonged rupture of membranes, will start antibiotics at this time. She has been afebrile throughout her labor. She is doing well with pain management.            Plan:   AROM at 11:20 on 18 with clear odorless fluid  IUPC placed at this time  Prophylactic antibiotic for prolonged rupture of membranes  Labor induction with Pitocin  Anticipate       This document serves as a record of the services and decisions personally performed by Joel Martinez MD. It was created on his behalf by Gab Hutson, a trained medical student. The creation of this record is based on the provider's observations and the statements of the patient.      Gab Hutson, MS3  2018         I agree with the PFSH and ROS as completed by the MS, .  The remainder of the encounter was performed by me and scribed by the MS.  The scribed note accurately reflects my personal services and the decisions made by me.     Joel Martinez MD

## 2018-01-29 NOTE — ANESTHESIA PROCEDURE NOTES
Peripheral nerve/Neuraxial procedure note : epidural catheter  Pre-Procedure  Performed by  WAGNER HERNANDEZ   Location: OB      Pre-Anesthestic Checklist: patient identified, IV checked, risks and benefits discussed, informed consent, monitors and equipment checked, pre-op evaluation and at physician/surgeon's request    Timeout  Correct Patient: Yes   Correct Procedure: Yes   Correct Site: Yes   Correct Laterality: N/A   Correct Position: Yes   Site Marked: N/A   .   Procedure Documentation    .    Procedure:    Epidural catheter.  Insertion Site:L3-4  (midline approach) Injection technique: LORT air   Local skin infiltrated with 3 mL of 1% lidocaine.       Patient Prep;mask, sterile gloves, povidone-iodine 7.5% surgical scrub, patient draped.  .  Needle: Touhy needle, Membreno Needle Gauge: 18.    Needle Length (Inches) 3.5  # of attempts: 2 and # of redirects:  .   Catheter: 20 G . .  Catheter threaded easily  3 cm epidural space.  .   .    Assessment/Narrative  Paresthesias: No.  .  .  No aspiration negative for heme or CSF  . Test dose of 3 mL lidocaine 1.5% w/ 1:200,000 epinephrine at. Test dose negative for signs of intravascular, subdural or intrathecal injection. Sensory Level Left: T6  Sensory Level Right: T6  Comments:  Patient tolerated the above procedure well.  1st attempt at L2-L3.  GENE with air no CSF, epidural space found without difficulty.    Upon threading the cath and testing for CSF with a 3 cc syringe, there appeared to be clear fluid coming back from the cath.  The epidural Cath was then DC'd.  A new epidural was placed without difficulty at L3-L4 above the level of the patients tattoo.   The cath was negative for CSF, heme, or paresthesia.  The cath was threaded to 12 cm at the skin and taped.  The patient is resting without complaint at this time.  Patient denies a headache at this time.  Will follow this patient as necessary.     0140, Patient is complaining of 4/10 right sided pain Patient  with a T12 level on the right.  Patient positioned on her right side and given a 6 cc bolus of bupivacaine.  After 10 minutes patient is without relief on right side, discussed replacing the epidural with the patient, patient wishes to proceed with another epidural at this time.      Epidural placed again with GENE  Air technique again.  Upon threading the catheter, clear CSF was noted in the syringe.  Will use this for her labor management.  Epidural infusion DC'd.  Nurses instructed that nothing goes in the catheter as it is intrathecal, the catheter was labeled as intrathecal.  Patient is resting with a bilateral T6 level without complaint at this time.  The plan for labor management was discussed with the patient.  We went over the signs   and symptoms of a spinal headache, treatment modalities, and discussed that she would be at an increased risk for this post-delivery.  I will stay in house and re-dose catheter as needed.  Patient and baby are doing well at this   time. Will continue to follow      0405 T-10 level bilaterally, patient complaining of starting to feel pain with contractions.  ITN dosage given as planned and discussed with patient for good relief and T-6 level bilaterally.  Will remain in house to monitor patient.    0605 T-12 level on the right, T-10 level on the left.  Patient starting to feel her contractions again.  ITN dose given again as planned, patient resting without complaint after dose, with T-6 level bilaterally.

## 2018-01-29 NOTE — PROGRESS NOTES
I contacted the nursing staff for a update on labor, the Pt was nahum every 2-4 minutes.  Contractions are  mild in intensity.    FHT's are reassuring with no significant decelerations.  The cervix is 3 cm The patient had SROM while shopping with clear fluid.     A: IUP at term with SROM    P:  The patient required pitocin augmentation with her last delivery with a identical scenario        Start Pitocin as per our protocol if no laboe over the next two hours       Anticipate vaginal delivery        Routine maternal and fetal monitoring     Joel Martinez MD

## 2018-01-29 NOTE — PROGRESS NOTES
Patient had an epidural placed test dose at 2319, epidural dose in 2 parts at 2323 and 2328, epidural pump started at 2335. VSS FHTS 135 .

## 2018-01-29 NOTE — L&D DELIVERY NOTE
Delivery Summary    Wyatt Bruno MRN# [id number]   Age: 27 year old YOB: 1990     ASSESSMENT & PLAN:   Patient is a 27 year old year old  at 39w3d EGA who had  of a 6 lb 5 oz viable female infant at 1313.  Patient presented with SROM at 1300 on 18 and progressed normally through stage one labor, with no complications other than prolonged rupture of membranes for which she was treated with Ancef. She was complete at 1251.  Intrathecal anesthesia boluses were utilized for pain control.  Head was delivered in RJ position.  Shoulders and body delivered without difficulty.  Infant had a spontaneous cry and APGAR scores of 9 at one minute and 9 at five minutes.  Placenta spontaneously delivered intact, with 3 vessel cord at 1317.  Inspection revealed a few small superficial abrasions but no lacerations. EBL was 50 mL.  Mother and infant doing well.        This document serves as a record of the services and decisions personally performed by Joel Martinez MD. It was created on his behalf by Gab Hutson, a trained medical student. The creation of this record is based on the provider's observations and the statements of the patient.      Gab Hutson, MS3  2018            Labor Event Times    Labor onset date:  18 Onset time:   1:00 AM   Dilation complete date:  18 Complete time:  12:51 PM   Start pushing date/time:  2018 1311            Labor Length    1st Stage (hrs):  11 (min):  51   2nd Stage (hrs):  0 (min):  22   3rd Stage (hrs):  0 (min):  4      Labor Events     labor?:  No   Labor Type:  AROM      Antibiotics received during labor?:  Yes      Rupture identifier:  Rupture 1   Rupture date/time: 18 1300   Rupture type:  Spontaneous rupture of membranes occuring during spontaneous labor or augmentation   Fluid color:  Clear   Fluid odor:  Normal      1:1 continuous labor support provided by?:  RN          Delivery/Placenta Date and Time   "  Delivery Date:  18 Delivery Time:   1:13 PM   Placenta Date/Time:  2018  1:17 PM   Oxytocin given at the time of delivery:  after delivery of placenta      Vaginal Counts    Initial count performed by 2 team members:   Two Team Members   Aury Martinez          Needles Suture Alma Sponges Instruments   Initial counts 2  5    Added to count       Final counts 2  5       Placed during labor Accounted for at the end of labor   No NA   Yes Yes   No NA      Final count performed by 2 team members:   Two Team Members   ERIBERTO Martinez               Apgars    Living status:  Living    1 Minute 5 Minute 10 Minute 15 Minute 20 Minute   Skin color: 1  1       Heart rate: 2  2       Reflex irritability: 2  2       Muscle tone: 2  2       Respiratory effort: 2  2       Total: 9  9          Apgars assigned by:  SUSIE MORRIS RN      Cord    Vessels:  3 Vessels Complications:  None   Cord Blood Disposition:  Lab Gases Sent?:  No          Resuscitation    Methods:  None          Measurements    Weight:  6 lb 5 oz Length:  1' 9.5\"   Head circumference:  34.3 cm Chest circumference:  30.5 cm      Skin to Skin and Feeding Plan    Skin to skin initiation date/time: 18 1313   Skin to skin with:  Mother   Skin to skin end date/time:     Breastfeeding initiated date/time:  2018 1402   How do you plan to feed your baby:  Breastfeeding      Labor Events and Shoulder Dystocia    Fetal Tracing Prior to Delivery:  Category 1   Shoulder dystocia present?:  Neg            Delivery (Maternal) (Provider to Complete) (348694)    Episiotomy:  None   Perineal lacerations:  None    Vaginal laceration?:  No    Cervical laceration?:  No          Mother's Information  Mother: Wyatt Bruno #[id number]    Start of Mother's Information     IO Blood Loss  18 0100 - 18 1455    Mom's I/O Activity            End of Mother's Information  Mother: Wyatt Bruno #[id number]            Delivery - " Provider to Complete ([id number])    Delivering clinician:  JOEL GUERRERO   Attempted Delivery Types (Choose all that apply):  Spontaneous Vaginal Delivery   Delivery Type (Choose the 1 that will go to the Birth History):  Vaginal, Spontaneous Delivery                     Other personnel:   Provider Role   RIVER [employer] Medical Student   FARZANA MORRIS Registered Nurse   ELIDIA NAIDU Registered Nurse            Placenta    Delayed Cord Clamping:  Done   Date/Time:  1/29/2018  1:17 PM   Removal:  Spontaneous   Comments:  [place]   Disposition:  Hospital disposal      Anesthesia    Method:  Intrathecal         Presentation and Position    Presentation:  Vertex   Position:  Left Occiput Anterior                     I agree with the PFSH and ROS as completed by the MS, .  The remainder of the encounter was performed by me and scribed by the MS.  The scribed note accurately reflects my personal services and the decisions made by me.     Joel Guerrero MD

## 2018-01-29 NOTE — PROGRESS NOTES
Patient has been unable to rest. States she is still feeling the contractions on her right side. Has been laying on her right side to get epidural to see if she could get more relief, but has not been successful. States the pain is tolerable. Rates it 4/10 on pain scale. Med student here to see patient. Cervix checked. Is 5.5/90/-1. Mitchell catheter placed. Will notify anesthesia to see if we can get her more pain relief.

## 2018-01-29 NOTE — PLAN OF CARE
Problem: Patient Care Overview  Goal: Plan of Care/Patient Progress Review  Outcome: Improving  S: Delivery  B: spontaneous Labor,  @ [id number] gestation, GBS negative  A: Patient delivered Vaginal at 1313 with Dr. MUSTAFA in attendance. Baby delivered and placed on mother's low abdomen for delayed cord clamping where baby was dried and stimulated. After cord clamped and cut, baby was placed skin to skin on mother's chest within 5 minutes following delivery . Apgars 9/9. Placenta was delivered @ 1317 followed by administration of oxytocin. Bonding initiated with mom and baby. Educated mother on importance of exclusive breast feeding, expected feeding readiness cues and encouraged her to observe for feeding cues. Mother informed that breast feeding assistance would be provided. See flowsheet for VS and PP checks. Labor care plan goals met.  R: Expect routine postpartum care. Anticipate first feeding within the hour.

## 2018-01-29 NOTE — PROGRESS NOTES
Cervix 7/95/-1 per medical student. Patient comfortable after intrathecal dose. FHT's 150's with moderate variability. Accels present. No decels. Pitocin increased to 8mu.Contractions every 2-2.5 minutes

## 2018-01-29 NOTE — PLAN OF CARE
Problem: Patient Care Overview  Goal: Plan of Care/Patient Progress Review  Outcome: Improving  S: ITN rebolus B: recent AROM at 1115A: Contraction pain worsening, requesting rebolus of ITN. Anesthesia here, ITN rebolused, Pain improved to a 3/10 scale. Pt verbalizies tolerable. VSS, FHTS 140's with accels. R: Call anesthesia if pain worsens.

## 2018-01-29 NOTE — PROGRESS NOTES
Was able to sleep for about 30 minutes. Now starting to feel contractions again. Rates them 4/10. She says it is tolerable Will wait a bit more and monitor contractions for a bit now that she is supine. Was not picking them up well when she was on her side. Then will call anesthesia and let them know. Patient is fine with this and will let me know if pain gets any worse before then
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
## 2018-01-29 NOTE — PROGRESS NOTES
ITN rebolussed at 1006, with pain relief within 3 minutes. VSS, FHT's stable  Without decels. Pt repositioned onto RL. Will continue close fetal monitoring and close monitoring of VS.

## 2018-01-29 NOTE — PROGRESS NOTES
Pt is nahum every 2-3  minutes with pitocin augmentation at 10 mil  Contractions are  Mild to moderate  in intensity.    FHT's are reassuring with no significant decelerations.  No concerns as per nursing   A: IUP in Labor with pitocin augmentation  P:  Anticipate vaginal delivery after laboring down        Routine maternal and fetal monitoring        IUPC for active labor management      Joel Martinez MD

## 2018-01-29 NOTE — PROGRESS NOTES
S: ITN rebroyce, B: as previously documented, A: Anesthesia here and rebolussed at 1031. VSS, FHTS 150's without decels. Afebrile. Good pain control. R: Anticipating Dr MUSTAFA coming soon.

## 2018-01-29 NOTE — PROGRESS NOTES
Dr. MUSTAFA / Felipe August MS here to perform AROM, fluid color  clear and moderate amount noted. Cervix is now 6 cm. Patient repositioned and FHR stable after procedure. Will continue to observe for progress and recheck cervix in the next 1-2 hours. IUPC placed with ease, No decels following. Will augment depending MVU.

## 2018-01-30 VITALS
SYSTOLIC BLOOD PRESSURE: 114 MMHG | TEMPERATURE: 98.3 F | RESPIRATION RATE: 18 BRPM | DIASTOLIC BLOOD PRESSURE: 55 MMHG | HEART RATE: 89 BPM | OXYGEN SATURATION: 99 %

## 2018-01-30 LAB — HGB BLD-MCNC: 9.2 G/DL (ref 11.7–15.7)

## 2018-01-30 PROCEDURE — 25000132 ZZH RX MED GY IP 250 OP 250 PS 637: Performed by: FAMILY MEDICINE

## 2018-01-30 PROCEDURE — 36415 COLL VENOUS BLD VENIPUNCTURE: CPT | Performed by: FAMILY MEDICINE

## 2018-01-30 PROCEDURE — 85018 HEMOGLOBIN: CPT | Performed by: FAMILY MEDICINE

## 2018-01-30 RX ORDER — IBUPROFEN 800 MG/1
800 TABLET, FILM COATED ORAL EVERY 6 HOURS PRN
Qty: 100 TABLET | Refills: 0 | Status: SHIPPED | OUTPATIENT
Start: 2018-01-30 | End: 2021-08-17

## 2018-01-30 RX ORDER — HYDROCODONE BITARTRATE AND ACETAMINOPHEN 7.5; 325 MG/1; MG/1
1 TABLET ORAL EVERY 4 HOURS PRN
Qty: 24 TABLET | Refills: 0 | OUTPATIENT
Start: 2018-01-30 | End: 2018-03-12

## 2018-01-30 RX ADMIN — IBUPROFEN 800 MG: 800 TABLET ORAL at 02:45

## 2018-01-30 RX ADMIN — IBUPROFEN 800 MG: 800 TABLET ORAL at 15:32

## 2018-01-30 RX ADMIN — IBUPROFEN 800 MG: 800 TABLET ORAL at 09:15

## 2018-01-30 NOTE — PLAN OF CARE
Problem: Patient Care Overview  Goal: Plan of Care/Patient Progress Review  Outcome: Improving  S: Shift review   B:Wyatt is a  who delivered vaginally on .  A: VSS, patient is independent with mobility, pain well controlled with p.o. pain meds, Pt denies any increase in neck/ headache pain after ITN catheter removed.. Handles baby with confidence.  R: Continue with routine PP care, Pt will alert us if neck and head pain worsens as pt had a wet tap.

## 2018-01-30 NOTE — ANESTHESIA POSTPROCEDURE EVALUATION
"Patient: Wyatt Bruno    * No procedures listed *    Diagnosis:Active Labor  Dr. Martinez  Diagnosis Additional Information: No value filed.    Anesthesia Type:  Epidural    Note:  Anesthesia Post Evaluation    Patient location during evaluation: Bedside and Floor  Patient participation: Able to fully participate in evaluation  Level of consciousness: awake and alert  Pain management: adequate  Airway patency: patent  Cardiovascular status: acceptable  Respiratory status: acceptable  Hydration status: acceptable  PONV: none     Anesthetic complications: None    Comments: PT was not happy that her epidural took 3 times to put in but in the end she was pleased with anesthesia \"you made me comfortable for delivery\" VSS. PT complain of headache. Possible spinal headache from a wet tap. Will follow as needed.        Last vitals:  Vitals:    01/29/18 1500 01/29/18 1630 01/29/18 1631   BP: 135/76  129/60   Pulse: 84  94   Resp: 18  18   Temp:  98.2  F (36.8  C)    SpO2:            Electronically Signed By: JEFF Clay CRNA  January 29, 2018  7:44 PM  "

## 2018-01-30 NOTE — ANESTHESIA POSTPROCEDURE EVALUATION
Patient: Wyatt Bruno    * No procedures listed *    Diagnosis:Active Labor  Dr. Martinez  Diagnosis Additional Information: No value filed.    Anesthesia Type:  Epidural    Note:  Anesthesia Post Evaluation    Patient location during evaluation: Bedside and Floor  Patient participation: Able to fully participate in evaluation  Level of consciousness: awake  Pain management: adequate  Airway patency: patent  Cardiovascular status: acceptable and hemodynamically stable  Respiratory status: acceptable, room air and nonlabored ventilation  Hydration status: stable  PONV: none     Anesthetic complications: None    Comments: Patient was happy with the anesthesia care received and no anesthesia related complications were noted.  I will follow up with the patient again if it is needed.        Last vitals:  Vitals:    01/29/18 1631 01/30/18 0030 01/30/18 0749   BP: 129/60 101/56 118/73   Pulse: 94 66 75   Resp: 18  18   Temp:  97.2  F (36.2  C) 96.9  F (36.1  C)   SpO2:   96%         Electronically Signed By: JEFF Ceron CRNA  January 30, 2018  1:04 PM

## 2018-01-30 NOTE — DISCHARGE SUMMARY
[facility] Discharge Summary    Wyatt Bruno MRN# [id number]   Age: 27 year old YOB: 1990     Date of Admission:  1/28/2018  Date of Discharge::  1/30/2018  Admitting Physician:  Joel Martinez MD  Discharge Physician:  Joel Martinez MD  Home clinic: [facility]          Admission Diagnoses:   Leakage, amniotic fluid  Normal labor          Discharge Diagnosis:   Normal spontaneous vaginal delivery  Intrauterine pregnancy at 39w3d weeks gestation          Procedures:   Procedure(s): Intrauterine pressure catheter placement       No other procedures performed during this admission           Medications Prior to Admission:     Prescriptions Prior to Admission   Medication Sig Dispense Refill Last Dose     ferrous sulfate (IRON) 325 (65 FE) MG tablet Take 1 tablet (325 mg) by mouth daily (with breakfast) 90 tablet 2 1/28/2018 at 0800     Prenatal Vit-Fe Fumarate-FA (PRENATAL MULTIVITAMIN  PLUS IRON) 27-0.8 MG TABS per tablet Take 1 tablet by mouth daily   1/27/2018 at 2100             Discharge Medications:     Current Discharge Medication List      START taking these medications    Details   HYDROcodone-acetaminophen (NORCO) 7.5-325 MG per tablet Take 1 tablet by mouth every 4 hours as needed for moderate to severe pain  Qty: 24 tablet, Refills: 0    Associated Diagnoses: Normal labor      ibuprofen (ADVIL/MOTRIN) 800 MG tablet Take 1 tablet (800 mg) by mouth every 6 hours as needed for other (cramping)  Qty: 100 tablet, Refills: 0    Associated Diagnoses: Normal labor         CONTINUE these medications which have NOT CHANGED    Details   ferrous sulfate (IRON) 325 (65 FE) MG tablet Take 1 tablet (325 mg) by mouth daily (with breakfast)  Qty: 90 tablet, Refills: 2    Associated Diagnoses: Iron deficiency anemia, unspecified iron deficiency anemia type      Prenatal Vit-Fe Fumarate-FA (PRENATAL MULTIVITAMIN  PLUS IRON) 27-0.8 MG TABS per tablet Take 1 tablet by mouth daily                    Consultations:   No consultations were requested during this admission          Brief History of Labor:   Patient presented for SROM at 1300 on  and was started on pitocin to augment her labor. She had a hind bag that was artificially ruptured on . She received intrathecal boluses for pain management and had some headaches. Her labor was complicated by prolonged rupture of membranes for which she was given one dose of cefazolin. She remained afebrile throughout labor. She was complete and delivered a healthy female  without complications in one push. She had no lacerations and minimal blood loss.            Hospital Course:   The patient's hospital course was unremarkable.  On discharge, her pain was well controlled. She has some ongoing headache but she describes it as dull and tolerable. Vaginal bleeding is similar to peak menstrual flow.  Voiding without difficulty.  Ambulating well and tolerating a normal diet.  No fever, no abnormal discharge. Breastfeeding well. She will speak with the lactation consultant prior to discharge.  Infant is stable.  No bowel movement yet.  She was discharged on post-partum day #2.    Post-partum hemoglobin:   Hemoglobin   Date Value Ref Range Status   2018 9.2 (L) 11.7 - 15.7 g/dL Final             Discharge Instructions and Follow-Up:   Discharge diet: Regular   Discharge activity: No lifting more than 20# x 2 wks then increase gradually by 10# per week.  No driving or operating machinery while on narcotic analgesics  Pelvic rest: abstain from intercourse and do not use tampons for 6 week(s)   Discharge follow-up: Follow up with primary care provider in 6 weeks   Wound care: Drink plenty of fluids  Ice to area for comfort           Discharge Disposition:   Discharged to home            This document serves as a record of the services and decisions personally performed by Joel Martinez MD. It was created on his behalf by alda Pederson  trained medical student. The creation of this record is based on the provider's observations and the statements of the patient.      Gab Hutson, MS3  January 30, 2018      Attestation:     I agree with the PFSH and ROS as completed by the MS, .  The remainder of the encounter was performed by me and scribed by the MS.  The scribed note accurately reflects my personal services and the decisions made by me.       Joel Martinez MD

## 2018-01-30 NOTE — PLAN OF CARE
Problem: Patient Care Overview  Goal: Plan of Care/Patient Progress Review  Outcome: Improving  S: Shift review   B:Wyatt is a  who delivered vaginally on   A: VSS, patient is independent with mobility, pain well controlled with p.o. pain meds. Handles baby with confidence.  R: Continue with routine PP care

## 2018-01-30 NOTE — ANESTHESIA CARE TRANSFER NOTE
Patient: Wyatt Bruno    * No procedures listed *    Diagnosis: Active Labor  Dr. Martinez  Diagnosis Additional Information: No value filed.    Anesthesia Type:   Epidural     Note:  Airway :Room Air  Patient transferred to:Labor and Delivery  Handoff Report: Identifed the Patient, Identified the Reponsible Provider, Reviewed the pertinent medical history, Discussed the surgical course, Reviewed Intra-OP anesthesia mangement and issues during anesthesia, Set expectations for post-procedure period and Allowed opportunity for questions and acknowledgement of understanding      Vitals: (Last set prior to Anesthesia Care Transfer)              Electronically Signed By: JEFF Ceron CRNA  January 30, 2018  1:04 PM

## 2018-01-30 NOTE — PLAN OF CARE
Problem: Labor (Cervical Ripen, Induct, Augment) (Adult,Obstetrics,Pediatric)  Goal: Signs and Symptoms of Listed Potential Problems Will be Absent, Minimized or Managed (Labor)  Signs and symptoms of listed potential problems will be absent, minimized or managed by discharge/transition of care (reference Labor (Cervical Ripen, Induct, Augment) (Adult,Obstetrics,Pediatric) CPG).   Care transferred to Opal BOSTON RN       Patient/Caregiver provided printed discharge information.

## 2018-01-30 NOTE — PLAN OF CARE
Problem: Patient Care Overview  Goal: Discharge Needs Assessment  Outcome: Completed Date Met: 18  S-(situation): Discharge  To home    B-(background): Patient had a Vaginal delivery with ITN wet tap. Baby girl Baby's name Nereyda, breast: . Support person's name Benito    A-(assessment): following pathway. Abdominal cramping has been well controlled during her stay with ibuprofen. She plans to use her OTC ibuprofen at home rather than picking up her scripts. She is attentive and caring for . Breastfeeding is going fairly well. She continues to decline help and has not wanted nurses to observe latch and feeding. She denies headache at this time r/t wet tap. She is aware to follow up if she has any concerns.    R-(recommendations): Discharge instructions reviewed and questions answered. Belongings gathered and returned to the patient. Agreed to follow up in 6 weeks or sooner with any question or concerns.     Nursing Discharge Checklist:    Pneumovax screened and given, if appropriate: N/A  Influenza vaccine screened and given, if appropriate: N/A  Staples removed (): N/A  Breast milk returned: N/A  Hydrogel pads sent home:YES  Birth Certificate Done: YES

## 2018-01-30 NOTE — DISCHARGE INSTRUCTIONS
[facility] Discharge Instructions     Discharge disposition:  Discharged to home       Diet:  Regular       Activity Lifting restricted to 10 pounds  No lifting, driving, or strenuous exercise for 2 week(s)       Follow-up: Follow up with primary care provider in 6 weeks       Additional instructions: Please drink plenty of liquids 7- 8 glasses a day, rest when needed, take your pain medications and ibuprofen if ordered, please call me if you have any questions or concerns  at the clinic at [phone].  Please enjoy your time with your new baby, Dr. Martinez          Additional Patient Information:  You do not need any additional instructions today.

## 2018-02-01 LAB
ABO + RH BLD: NORMAL
ABO + RH BLD: NORMAL
SPECIMEN EXP DATE BLD: NORMAL

## 2018-02-02 NOTE — PROGRESS NOTES
Willow Beachsamy Bruno  Gender: female  : 1990  [address]  [place] [id number] [phone] (home) none (work)  Medical Record: [id number]  Primary Care Provider: Amber Crockett       [facility]   ?   Discharge Phone Call: Key Words/Key Times     How are you and the baby? Things are going well.    How are feedings going? Great. Brst fdg q3hr on both sides.    Voiding & Stooling? Lots.    Any questions or concerns? No.    Follow-up appointment? Had baby's f/u yesterday. Slight weight loss for baby, but nothing concerning. Wyatt understands that baby will slowly regain that weight loss. Scheduled for another f/u next week. Mom has yet to make her own f/u appt.      We want to provide excellent care here at [facility]. Do you have any feedback for us that would help us improve? Nope. You guys were great!    Call back COMMENTS:         Attempted Calls:   _________     __________

## 2018-03-12 ENCOUNTER — OFFICE VISIT (OUTPATIENT)
Dept: FAMILY MEDICINE | Facility: CLINIC | Age: 28
End: 2018-03-12
Payer: COMMERCIAL

## 2018-03-12 VITALS
TEMPERATURE: 99.2 F | OXYGEN SATURATION: 94 % | WEIGHT: 216 LBS | HEART RATE: 92 BPM | DIASTOLIC BLOOD PRESSURE: 80 MMHG | SYSTOLIC BLOOD PRESSURE: 120 MMHG | BODY MASS INDEX: 35.94 KG/M2 | RESPIRATION RATE: 18 BRPM

## 2018-03-12 PROBLEM — Z36.89 ENCOUNTER FOR TRIAGE IN PREGNANT PATIENT: Status: RESOLVED | Noted: 2018-01-20 | Resolved: 2018-03-12

## 2018-03-12 PROBLEM — O42.90 LEAKAGE, AMNIOTIC FLUID: Status: RESOLVED | Noted: 2018-01-28 | Resolved: 2018-03-12

## 2018-03-12 PROBLEM — Z34.80 SUPERVISION OF OTHER NORMAL PREGNANCY, ANTEPARTUM: Status: RESOLVED | Noted: 2017-08-17 | Resolved: 2018-03-12

## 2018-03-12 PROBLEM — Z37.9 NORMAL LABOR: Status: RESOLVED | Noted: 2018-01-28 | Resolved: 2018-03-12

## 2018-03-12 LAB — HGB BLD-MCNC: 12.9 G/DL (ref 11.7–15.7)

## 2018-03-12 PROCEDURE — 99207 ZZC POST PARTUM EXAM: CPT | Performed by: PHYSICIAN ASSISTANT

## 2018-03-12 PROCEDURE — 00000218 ZZHCL STATISTIC OBHBG - HEMOGLOBIN: Performed by: PHYSICIAN ASSISTANT

## 2018-03-12 PROCEDURE — 36415 COLL VENOUS BLD VENIPUNCTURE: CPT | Performed by: PHYSICIAN ASSISTANT

## 2018-03-12 ASSESSMENT — PAIN SCALES - GENERAL: PAINLEVEL: NO PAIN (0)

## 2018-03-12 NOTE — MR AVS SNAPSHOT
After Visit Summary   3/12/2018    Wyatt Bruno    MRN: [id number]           Patient Information     Date Of Birth          1990        Visit Information        Provider Department      3/12/2018 10:15 AM Amber Crockett PA-C [facility]        Today's Diagnoses     Routine postpartum follow-up    -  1       Follow-ups after your visit        Who to contact     If you have questions or need follow up information about today's clinic visit or your schedule please contact [facility] directly at [phone].  Normal or non-critical lab and imaging results will be communicated to you by [facility]aling[facility], letter or phone within 4 business days after the clinic has received the results. If you do not hear from us within 7 days, please contact the clinic through [facility] or phone. If you have a critical or abnormal lab result, we will notify you by phone as soon as possible.  Submit refill requests through [employer] or call your pharmacy and they will forward the refill request to us. Please allow 3 business days for your refill to be completed.          Additional Information About Your Visit        MyChart Information     [employer] gives you secure access to your electronic health record. If you see a primary care provider, you can also send messages to your care team and make appointments. If you have questions, please call your primary care clinic.  If you do not have a primary care provider, please call [phone] and they will assist you.        Care EveryWhere ID     This is your Care EveryWhere ID. This could be used by other organizations to access your [place] medical records  [id number]        Your Vitals Were     Pulse Temperature Respirations Last Period Pulse Oximetry BMI (Body Mass Index)    92 99.2  F (37.3  C) (Tympanic) 18 04/28/2017 (Exact Date) 94% 35.94 kg/m2       Blood Pressure from Last 3 Encounters:   03/12/18 120/80   01/30/18 114/55   01/26/18  128/76    Weight from Last 3 Encounters:   03/12/18 216 lb (98 kg)   01/25/18 231 lb (104.8 kg)   01/18/18 230 lb (104.3 kg)              We Performed the Following     OB HEMOGLOBIN        Primary Care Provider Office Phone # Fax #    Amber Crockett PA-C [phone] [address] [place] [address]        Equal Access to Services     KHUSHBOO ZAFAR : Hadii aad ku hadasho Soomaali, waaxda luqadaha, qaybta kaalmada adeegyada, waxay idiin hayaan adeeg khtcsh la'heavenn . So [facility] [phone].    ATENCIÓN: Si habla espleonor, tiene a grimaldo disposición servicios gratuitos de asistencia lingüística. Llame al [phone].    We comply with applicable federal civil rights laws and [place] laws. We do not discriminate on the basis of race, color, national origin, age, disability, sex, sexual orientation, or gender identity.            Thank you!     Thank you for choosing [facility]  for your care. Our goal is always to provide you with excellent care. Hearing back from our patients is one way we can continue to improve our services. Please take a few minutes to complete the written survey that you may receive in the mail after your visit with us. Thank you!             Your Updated Medication List - Protect others around you: Learn how to safely use, store and throw away your medicines at www.disposemymeds.org.          This list is accurate as of 3/12/18 10:55 AM.  Always use your most recent med list.                   Brand Name Dispense Instructions for use Diagnosis    ferrous sulfate 325 (65 FE) MG tablet    IRON    90 tablet    Take 1 tablet (325 mg) by mouth daily (with breakfast)    Iron deficiency anemia, unspecified iron deficiency anemia type       ibuprofen 800 MG tablet    ADVIL/MOTRIN    100 tablet    Take 1 tablet (800 mg) by mouth every 6 hours as needed for other (cramping)    Normal labor

## 2018-03-12 NOTE — NURSING NOTE
"Chief Complaint   Patient presents with     Post Partum Exam       Initial /80  Pulse 92  Temp 99.2  F (37.3  C) (Tympanic)  Resp 18  Wt 216 lb (98 kg)  LMP 04/28/2017 (Exact Date)  SpO2 94%  BMI 35.94 kg/m2 Estimated body mass index is 35.94 kg/(m^2) as calculated from the following:    Height as of 1/11/18: 5' 5\" (1.651 m).    Weight as of this encounter: 216 lb (98 kg).  BP completed using cuff size: eduar Ferrer MA      "
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
## 2018-04-29 ENCOUNTER — HOSPITAL ENCOUNTER (EMERGENCY)
Facility: CLINIC | Age: 28
Discharge: HOME | End: 2018-04-29
Attending: PHYSICIAN ASSISTANT | Admitting: PHYSICIAN ASSISTANT
Payer: COMMERCIAL

## 2018-04-29 VITALS
OXYGEN SATURATION: 100 % | RESPIRATION RATE: 19 BRPM | SYSTOLIC BLOOD PRESSURE: 138 MMHG | WEIGHT: 200 LBS | BODY MASS INDEX: 33.28 KG/M2 | DIASTOLIC BLOOD PRESSURE: 91 MMHG | TEMPERATURE: 98.4 F

## 2018-04-29 DIAGNOSIS — H10.9 BACTERIAL CONJUNCTIVITIS OF RIGHT EYE: ICD-10-CM

## 2018-04-29 PROCEDURE — 99282 EMERGENCY DEPT VISIT SF MDM: CPT | Performed by: PHYSICIAN ASSISTANT

## 2018-04-29 PROCEDURE — 99284 EMERGENCY DEPT VISIT MOD MDM: CPT | Mod: Z6 | Performed by: PHYSICIAN ASSISTANT

## 2018-04-29 RX ORDER — POLYMYXIN B SULFATE AND TRIMETHOPRIM 1; 10000 MG/ML; [USP'U]/ML
1 SOLUTION OPHTHALMIC 4 TIMES DAILY
Qty: 1 BOTTLE | Refills: 0 | Status: SHIPPED | OUTPATIENT
Start: 2018-04-29 | End: 2018-07-16

## 2018-04-29 NOTE — ED AVS SNAPSHOT
[facility] Emergency Department    [address] [place] [address]    Phone:  [phone]    Fax:  [phone]                                       Wyatt Bruno   MRN: [id number]    Department:  [facility] Emergency Department   Date of Visit:  4/29/2018           After Visit Summary Signature Page     I have received my discharge instructions, and my questions have been answered. I have discussed any challenges I see with this plan with the nurse or doctor.    ..........................................................................................................................................  Patient/Patient Representative Signature      ..........................................................................................................................................  Patient Representative Print Name and Relationship to Patient    ..................................................               ................................................  Date                                            Time    ..........................................................................................................................................  Reviewed by Signature/Title    ...................................................              ..............................................  Date                                                            Time

## 2018-04-29 NOTE — ED AVS SNAPSHOT
[facility] Emergency Department    [address]     [place] [address]    Phone:  [phone]    Fax:  [phone]                                       Wyatt Bruno   MRN: [id number]    Department:  [facility] Emergency Department   Date of Visit:  4/29/2018           Patient Information     Date Of Birth          1990        Your diagnoses for this visit were:     Bacterial conjunctivitis of right eye        You were seen by Go Kyle PA-C.      Follow-up Information     Follow up with Amber Crockett PA-C.    Specialty:  Family Practice    Why:  As needed, If symptoms worsen    Contact information:    [address]   [place] [address]  [phone]          Discharge Instructions         Bacterial Conjunctivitis    You have an infection in the membranes covering the white part of the eye. This part of the eye is called the conjunctiva. The infection is called conjunctivitis. The most common symptoms of conjunctivitis include a thick, pus-like discharge from the eye, swollen eyelids, redness, eyelids sticking together upon awakening, and a gritty or scratchy feeling in the eye. Your infection was caused by bacteria. It may be treated with medicine. With treatment, the infection takes about 7 to 10 days to resolve.  Home care    Use prescribed antibiotic eye drops or ointment as directed to treat the infection.    Apply a warm compress (towel soaked in warm water) to the affected eye 3 to 4 times a day. Do this just before applying medicine to the eye.    Use a warm, wet cloth to wipe away crusting of the eyelids in the morning. This is caused by mucus drainage during the night. You may also use saline irrigating solution or artificial tears to rinse away mucus in the eye. Do not put a patch over the eye.    Wash your hands before and after touching the infected eye. This is to prevent spreading the infection to the other eye, and to other people. Don't share  your towels or washcloths with others.    You may use acetaminophen or ibuprofen to control pain, unless another medicine was prescribed. (Note: If you have chronic liver or kidney disease or have ever had a stomach ulcer or gastrointestinal bleeding, talk with your doctor before using these medicines.)    Don't wear contact lenses until your eyes have healed and all symptoms are gone.  Follow-up care  Follow up with your healthcare provider, or as advised.  When to seek medical advice  Call your healthcare provider right away if any of these occur:    Worsening vision    Increasing pain in the eye    Increasing swelling or redness of the eyelid    Redness spreading around the eye  Date Last Reviewed: 7/1/2017 2000-2017 [employer]. [address] [place] [address], [place] [address]. All rights reserved. This information is not intended as a substitute for professional medical care. Always follow your healthcare professional's instructions.          24 Hour Appointment Hotline       To make an appointment at any [facility], call [phone] ([phone]). If you don't have a family doctor or clinic, we will help you find one. [facility] are conveniently located to serve the needs of you and your family.             Review of your medicines      START taking        Dose / Directions Last dose taken    trimethoprim-polymyxin b ophthalmic solution   Commonly known as:  POLYTRIM   Dose:  1 drop   Quantity:  1 Bottle        Place 1 drop into both eyes 4 times daily   Refills:  0          Our records show that you are taking the medicines listed below. If these are incorrect, please call your family doctor or clinic.        Dose / Directions Last dose taken    ferrous sulfate 325 (65 Fe) MG tablet   Commonly known as:  IRON   Dose:  325 mg   Quantity:  90 tablet        Take 1 tablet (325 mg) by mouth daily (with breakfast)   Refills:  2        ibuprofen 800 MG tablet   Commonly known as:   ADVIL/MOTRIN   Dose:  800 mg   Quantity:  100 tablet        Take 1 tablet (800 mg) by mouth every 6 hours as needed for other (cramping)   Refills:  0                Prescriptions were sent or printed at these locations (1 Prescription)                   [facility] - [place], [place] - [address]   [address] [place] [address]    Telephone:  [phone]   Fax:  [phone]   Hours:                  E-Prescribed (1 of 1)         trimethoprim-polymyxin b (POLYTRIM) ophthalmic solution                Orders Needing Specimen Collection     None      Pending Results     No orders found from 4/27/2018 to 4/30/2018.            Pending Culture Results     No orders found from 4/27/2018 to 4/30/2018.            Pending Results Instructions     If you had any lab results that were not finalized at the time of your Discharge, you can call the ED Lab Result RN at [phone]. You will be contacted by this team for any positive Lab results or changes in treatment. The nurses are available 7 days a week from 10A to 6:30P.  You can leave a message 24 hours per day and they will return your call.        Thank you for choosing [facility]       Thank you for choosing [facility] for your care. Our goal is always to provide you with excellent care. Hearing back from our patients is one way we can continue to improve our services. Please take a few minutes to complete the written survey that you may receive in the mail after you visit with us. Thank you!        [employer] Information     [employer] gives you secure access to your electronic health record. If you see a primary care provider, you can also send messages to your care team and make appointments. If you have questions, please call your primary care clinic.  If you do not have a primary care provider, please call [phone] and they will assist you.        Care EveryWhere ID     This is your Care EveryWhere ID. This could be used by other  organizations to access your [place] medical records  [id number]        Equal Access to Services     LUCRECIA ZAFAR : Lennie Flores, thu raza, octaviano schmidt. So [facility] [place] [phone].    ATENCIÓN: Si habla español, tiene a grimaldo disposición servicios gratuitos de asistencia lingüística. Llame al [phone].    We comply with applicable federal civil rights laws and [place] laws. We do not discriminate on the basis of race, color, national origin, age, disability, sex, sexual orientation, or gender identity.            After Visit Summary       This is your record. Keep this with you and show to your community pharmacist(s) and doctor(s) at your next visit.

## 2018-04-30 NOTE — DISCHARGE INSTRUCTIONS
Bacterial Conjunctivitis    You have an infection in the membranes covering the white part of the eye. This part of the eye is called the conjunctiva. The infection is called conjunctivitis. The most common symptoms of conjunctivitis include a thick, pus-like discharge from the eye, swollen eyelids, redness, eyelids sticking together upon awakening, and a gritty or scratchy feeling in the eye. Your infection was caused by bacteria. It may be treated with medicine. With treatment, the infection takes about 7 to 10 days to resolve.  Home care    Use prescribed antibiotic eye drops or ointment as directed to treat the infection.    Apply a warm compress (towel soaked in warm water) to the affected eye 3 to 4 times a day. Do this just before applying medicine to the eye.    Use a warm, wet cloth to wipe away crusting of the eyelids in the morning. This is caused by mucus drainage during the night. You may also use saline irrigating solution or artificial tears to rinse away mucus in the eye. Do not put a patch over the eye.    Wash your hands before and after touching the infected eye. This is to prevent spreading the infection to the other eye, and to other people. Don't share your towels or washcloths with others.    You may use acetaminophen or ibuprofen to control pain, unless another medicine was prescribed. (Note: If you have chronic liver or kidney disease or have ever had a stomach ulcer or gastrointestinal bleeding, talk with your doctor before using these medicines.)    Don't wear contact lenses until your eyes have healed and all symptoms are gone.  Follow-up care  Follow up with your healthcare provider, or as advised.  When to seek medical advice  Call your healthcare provider right away if any of these occur:    Worsening vision    Increasing pain in the eye    Increasing swelling or redness of the eyelid    Redness spreading around the eye  Date Last Reviewed: 7/1/2017 2000-2017 [employer],  [facility]. [address] [place] [address]. All rights reserved. This information is not intended as a substitute for professional medical care. Always follow your healthcare professional's instructions.

## 2018-04-30 NOTE — ED NOTES
Pt presents with concerns of her right eye being red and painful.  Pt states eye is burning and having yellowish discharge that is now clear and sticky.
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
## 2018-05-30 ENCOUNTER — E-VISIT (OUTPATIENT)
Dept: FAMILY MEDICINE | Facility: CLINIC | Age: 28
End: 2018-05-30
Payer: COMMERCIAL

## 2018-05-30 ENCOUNTER — MYC MEDICAL ADVICE (OUTPATIENT)
Dept: FAMILY MEDICINE | Facility: CLINIC | Age: 28
End: 2018-05-30

## 2018-05-30 DIAGNOSIS — J06.9 VIRAL URI: Primary | ICD-10-CM

## 2018-05-30 PROCEDURE — 99444 ZZC PHYSICIAN ONLINE EVALUATION & MANAGEMENT SERVICE: CPT | Performed by: PHYSICIAN ASSISTANT

## 2018-05-30 NOTE — TELEPHONE ENCOUNTER
Patient is sent information for an evisit via [employer].  Will keep open to make sure patient checks MyChart.  TENISHA FaulknerN, RN

## 2018-07-16 ENCOUNTER — OFFICE VISIT (OUTPATIENT)
Dept: FAMILY MEDICINE | Facility: CLINIC | Age: 28
End: 2018-07-16
Payer: COMMERCIAL

## 2018-07-16 ENCOUNTER — HOSPITAL ENCOUNTER (OUTPATIENT)
Dept: ULTRASOUND IMAGING | Facility: CLINIC | Age: 28
Discharge: HOME | End: 2018-07-16
Attending: PHYSICIAN ASSISTANT | Admitting: PHYSICIAN ASSISTANT
Payer: COMMERCIAL

## 2018-07-16 VITALS
SYSTOLIC BLOOD PRESSURE: 116 MMHG | WEIGHT: 216 LBS | RESPIRATION RATE: 18 BRPM | TEMPERATURE: 97.2 F | HEART RATE: 90 BPM | OXYGEN SATURATION: 96 % | DIASTOLIC BLOOD PRESSURE: 72 MMHG | BODY MASS INDEX: 35.94 KG/M2

## 2018-07-16 DIAGNOSIS — R22.1 MASS OF NECK: Primary | ICD-10-CM

## 2018-07-16 DIAGNOSIS — R22.1 MASS OF NECK: ICD-10-CM

## 2018-07-16 LAB
BASOPHILS # BLD AUTO: 0 10E9/L (ref 0–0.2)
BASOPHILS NFR BLD AUTO: 0.6 %
CRP SERPL-MCNC: <2.9 MG/L (ref 0–8)
DIFFERENTIAL METHOD BLD: NORMAL
EOSINOPHIL NFR BLD AUTO: 2.3 %
ERYTHROCYTE [DISTWIDTH] IN BLOOD BY AUTOMATED COUNT: 13.1 % (ref 10–15)
ERYTHROCYTE [SEDIMENTATION RATE] IN BLOOD BY WESTERGREN METHOD: 9 MM/H (ref 0–20)
HCT VFR BLD AUTO: 39.9 % (ref 35–47)
HGB BLD-MCNC: 13 G/DL (ref 11.7–15.7)
IMM GRANULOCYTES # BLD: 0 10E9/L (ref 0–0.4)
IMM GRANULOCYTES NFR BLD: 0.2 %
LYMPHOCYTES # BLD AUTO: 2.1 10E9/L (ref 0.8–5.3)
LYMPHOCYTES NFR BLD AUTO: 44.4 %
MCH RBC QN AUTO: 29 PG (ref 26.5–33)
MCHC RBC AUTO-ENTMCNC: 32.6 G/DL (ref 31.5–36.5)
MCV RBC AUTO: 89 FL (ref 78–100)
MONOCYTES # BLD AUTO: 0.4 10E9/L (ref 0–1.3)
MONOCYTES NFR BLD AUTO: 9 %
NEUTROPHILS # BLD AUTO: 2.1 10E9/L (ref 1.6–8.3)
NEUTROPHILS NFR BLD AUTO: 43.5 %
NRBC # BLD AUTO: 0 10*3/UL
NRBC BLD AUTO-RTO: 0 /100
PLATELET # BLD AUTO: 185 10E9/L (ref 150–450)
RBC # BLD AUTO: 4.49 10E12/L (ref 3.8–5.2)
WBC # BLD AUTO: 4.8 10E9/L (ref 4–11)

## 2018-07-16 PROCEDURE — 99214 OFFICE O/P EST MOD 30 MIN: CPT | Performed by: PHYSICIAN ASSISTANT

## 2018-07-16 PROCEDURE — 36415 COLL VENOUS BLD VENIPUNCTURE: CPT | Performed by: PHYSICIAN ASSISTANT

## 2018-07-16 PROCEDURE — 86140 C-REACTIVE PROTEIN: CPT | Performed by: PHYSICIAN ASSISTANT

## 2018-07-16 PROCEDURE — 85652 RBC SED RATE AUTOMATED: CPT | Performed by: PHYSICIAN ASSISTANT

## 2018-07-16 PROCEDURE — 85025 COMPLETE CBC W/AUTO DIFF WBC: CPT | Performed by: PHYSICIAN ASSISTANT

## 2018-07-16 PROCEDURE — 76536 US EXAM OF HEAD AND NECK: CPT

## 2018-07-16 ASSESSMENT — PAIN SCALES - GENERAL: PAINLEVEL: NO PAIN (0)

## 2018-07-16 NOTE — NURSING NOTE
"Chief Complaint   Patient presents with     Lymphadenopathy       Initial There were no vitals taken for this visit. Estimated body mass index is 33.28 kg/(m^2) as calculated from the following:    Height as of 1/11/18: 5' 5\" (1.651 m).    Weight as of 4/29/18: 200 lb (90.7 kg).  BP completed using cuff size: eduar Ferrer MA      "

## 2018-07-16 NOTE — MR AVS SNAPSHOT
After Visit Summary   7/16/2018    Wyatt Bruno    MRN: [id number]           Patient Information     Date Of Birth          1990        Visit Information        Provider Department      7/16/2018 11:15 AM Amber Crockett PA-C [facility]        Today's Diagnoses     Mass of neck    -  1       Follow-ups after your visit        Future tests that were ordered for you today     Open Future Orders        Priority Expected Expires Ordered    US Head Neck Soft Tissue Routine  7/16/2019 7/16/2018            Who to contact     If you have questions or need follow up information about today's clinic visit or your schedule please contact [facility] directly at [phone].  Normal or non-critical lab and imaging results will be communicated to you by MyChart, letter or phone within 4 business days after the clinic has received the results. If you do not hear from us within 7 days, please contact the clinic through [employer] or phone. If you have a critical or abnormal lab result, we will notify you by phone as soon as possible.  Submit refill requests through [employer] or call your pharmacy and they will forward the refill request to us. Please allow 3 business days for your refill to be completed.          Additional Information About Your Visit        MyChart Information     [employer] gives you secure access to your electronic health record. If you see a primary care provider, you can also send messages to your care team and make appointments. If you have questions, please call your primary care clinic.  If you do not have a primary care provider, please call [phone] and they will assist you.        Care EveryWhere ID     This is your Care EveryWhere ID. This could be used by other organizations to access your [place] medical records  [id number]        Your Vitals Were     Pulse Temperature Respirations Pulse Oximetry BMI (Body Mass Index)       90 97.2  F (36.2  C)  (Temporal) 18 96% 35.94 kg/m2        Blood Pressure from Last 3 Encounters:   07/16/18 116/72   04/29/18 (!) 138/91   03/12/18 120/80    Weight from Last 3 Encounters:   07/16/18 216 lb (98 kg)   04/29/18 200 lb (90.7 kg)   03/12/18 216 lb (98 kg)              We Performed the Following     CBC with platelets differential     CRP inflammation     ESR: Erythrocyte sedimentation rate        Primary Care Provider Office Phone # Fax #    Amber Crockett PA-C [phone] [address] [place] [address]        Equal Access to Services     [facility]: Hadii laurence Flores, waaxda ry, qaybta kaalmada yolanda, octaviano giles . So [facility] [phone].    ATENCIÓN: Si habla español, tiene a grimaldo disposición servicios gratuitos de asistencia lingüística. [facility] [phone].    We comply with applicable federal civil rights laws and [place] laws. We do not discriminate on the basis of race, color, national origin, age, disability, sex, sexual orientation, or gender identity.            Thank you!     Thank you for choosing [facility]  for your care. Our goal is always to provide you with excellent care. Hearing back from our patients is one way we can continue to improve our services. Please take a few minutes to complete the written survey that you may receive in the mail after your visit with us. Thank you!             Your Updated Medication List - Protect others around you: Learn how to safely use, store and throw away your medicines at www.disposemymeds.org.          This list is accurate as of 7/16/18 11:57 AM.  Always use your most recent med list.                   Brand Name Dispense Instructions for use Diagnosis    ibuprofen 800 MG tablet    ADVIL/MOTRIN    100 tablet    Take 1 tablet (800 mg) by mouth every 6 hours as needed for other (cramping)    Normal labor
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
## 2018-10-22 ENCOUNTER — MYC MEDICAL ADVICE (OUTPATIENT)
Dept: FAMILY MEDICINE | Facility: CLINIC | Age: 28
End: 2018-10-22

## 2018-11-12 ENCOUNTER — MYC MEDICAL ADVICE (OUTPATIENT)
Dept: FAMILY MEDICINE | Facility: CLINIC | Age: 28
End: 2018-11-12

## 2019-04-05 ENCOUNTER — ALLIED HEALTH/NURSE VISIT (OUTPATIENT)
Dept: FAMILY MEDICINE | Facility: CLINIC | Age: 29
End: 2019-04-05
Payer: COMMERCIAL

## 2019-04-05 DIAGNOSIS — N91.2 AMENORRHEA: Primary | ICD-10-CM

## 2019-04-05 LAB — HCG UR QL: POSITIVE

## 2019-04-05 PROCEDURE — 81025 URINE PREGNANCY TEST: CPT | Performed by: FAMILY MEDICINE

## 2019-04-05 PROCEDURE — 99207 ZZC NO CHARGE NURSE ONLY: CPT

## 2019-04-05 NOTE — NURSING NOTE
Wyatt Bruno is a 29 year old here today for a pregnancy test.  LMP: No LMP recorded.  Wt: 0 lbs 0 oz.    Symptoms include weight gain, absence of menses and fatigue.    Wyatt informed of positive pregnancy test results. BEATRICE: 2019    Educational advice given: nutrition, smoking and drugs & alcohol.    Current medications reviewed: Yes    Previous pregnancy history remarkable for: None    Plan: schedule appointment with OB Educator and/or OB class, follow-up appointment with Dr. Martinez for pre-osei care, take multivitamin or pre- vitamins, OB Education packet given, requesting information re: termination of pregnancy and she is unsure of her plans for this pregnancy.    She is to call back if she has any questions or concerns.  She is advised to notify a provider immediately if she experiences any severe cramping or abdominal pain or any vaginal bleeding.      Nini Jaime RN

## 2019-04-11 ENCOUNTER — PRENATAL OFFICE VISIT (OUTPATIENT)
Dept: FAMILY MEDICINE | Facility: OTHER | Age: 29
End: 2019-04-11
Payer: COMMERCIAL

## 2019-04-11 DIAGNOSIS — Z34.90 NORMAL PREGNANCY: Primary | ICD-10-CM

## 2019-04-11 DIAGNOSIS — Z23 NEED FOR PROPHYLACTIC VACCINATION AND INOCULATION AGAINST INFLUENZA: ICD-10-CM

## 2019-04-11 PROCEDURE — 99207 ZZC NO CHARGE NURSE ONLY: CPT

## 2019-04-12 DIAGNOSIS — Z34.90 NORMAL PREGNANCY: ICD-10-CM

## 2019-04-12 LAB
ABO + RH BLD: NORMAL
ABO + RH BLD: NORMAL
B-HCG SERPL-ACNC: ABNORMAL IU/L (ref 0–5)
BLD GP AB SCN SERPL QL: NORMAL
BLOOD BANK CMNT PATIENT-IMP: NORMAL
ERYTHROCYTE [DISTWIDTH] IN BLOOD BY AUTOMATED COUNT: 12.6 % (ref 10–15)
HCT VFR BLD AUTO: 38.9 % (ref 35–47)
HGB BLD-MCNC: 13 G/DL (ref 11.7–15.7)
MCH RBC QN AUTO: 31 PG (ref 26.5–33)
MCHC RBC AUTO-ENTMCNC: 33.4 G/DL (ref 31.5–36.5)
MCV RBC AUTO: 93 FL (ref 78–100)
PLATELET # BLD AUTO: 162 10E9/L (ref 150–450)
RBC # BLD AUTO: 4.2 10E12/L (ref 3.8–5.2)
SPECIMEN EXP DATE BLD: NORMAL
WBC # BLD AUTO: 4.1 10E9/L (ref 4–11)

## 2019-04-12 PROCEDURE — 36415 COLL VENOUS BLD VENIPUNCTURE: CPT | Performed by: FAMILY MEDICINE

## 2019-04-12 PROCEDURE — 0064U ANTB TP TOTAL&RPR IA QUAL: CPT | Performed by: FAMILY MEDICINE

## 2019-04-12 PROCEDURE — 86901 BLOOD TYPING SEROLOGIC RH(D): CPT | Performed by: FAMILY MEDICINE

## 2019-04-12 PROCEDURE — 85027 COMPLETE CBC AUTOMATED: CPT | Performed by: FAMILY MEDICINE

## 2019-04-12 PROCEDURE — 86850 RBC ANTIBODY SCREEN: CPT | Performed by: FAMILY MEDICINE

## 2019-04-12 PROCEDURE — 87086 URINE CULTURE/COLONY COUNT: CPT | Performed by: FAMILY MEDICINE

## 2019-04-12 PROCEDURE — 86762 RUBELLA ANTIBODY: CPT | Performed by: FAMILY MEDICINE

## 2019-04-12 PROCEDURE — G0499 HEPB SCREEN HIGH RISK INDIV: HCPCS | Performed by: FAMILY MEDICINE

## 2019-04-12 PROCEDURE — 87389 HIV-1 AG W/HIV-1&-2 AB AG IA: CPT | Performed by: FAMILY MEDICINE

## 2019-04-12 PROCEDURE — 84702 CHORIONIC GONADOTROPIN TEST: CPT | Performed by: FAMILY MEDICINE

## 2019-04-12 PROCEDURE — 86900 BLOOD TYPING SEROLOGIC ABO: CPT | Performed by: FAMILY MEDICINE

## 2019-04-13 LAB
BACTERIA SPEC CULT: NO GROWTH
HBV SURFACE AG SERPL QL IA: NONREACTIVE
HIV 1+2 AB+HIV1 P24 AG SERPL QL IA: NONREACTIVE
Lab: NORMAL
RUBV IGG SERPL IA-ACNC: 45 IU/ML
SPECIMEN SOURCE: NORMAL
T PALLIDUM AB SER QL: NONREACTIVE

## 2019-04-25 ENCOUNTER — HOSPITAL ENCOUNTER (OUTPATIENT)
Dept: ULTRASOUND IMAGING | Facility: CLINIC | Age: 29
Discharge: HOME | End: 2019-04-25
Attending: FAMILY MEDICINE | Admitting: FAMILY MEDICINE
Payer: COMMERCIAL

## 2019-04-25 DIAGNOSIS — Z34.90 NORMAL PREGNANCY: ICD-10-CM

## 2019-04-25 PROCEDURE — 76801 OB US < 14 WKS SINGLE FETUS: CPT

## 2019-05-06 ENCOUNTER — TELEPHONE (OUTPATIENT)
Dept: FAMILY MEDICINE | Facility: CLINIC | Age: 29
End: 2019-05-06

## 2019-05-06 NOTE — TELEPHONE ENCOUNTER
Reason for Call:  Other     Detailed comments: Recent US shows her due date different than figured.  She would only be about 9wks and 5days at next schedule appointment.  Does Dr Martinez think she should change this appointment to when she is about 12weeks?    Phone Number Patient can be reached at: Cell number on file:    Telephone Information:   Mobile [phone]       Best Time: any    Can we leave a detailed message on this number? YES    Call taken on 5/6/2019 at 11:08 AM by Lisa Suazo

## 2019-05-06 NOTE — TELEPHONE ENCOUNTER
Per RF- keep 05/09 appt.     Called pt and relayed message.   Darlene Nathan, [facility]

## 2019-05-09 ENCOUNTER — PRENATAL OFFICE VISIT (OUTPATIENT)
Dept: FAMILY MEDICINE | Facility: CLINIC | Age: 29
End: 2019-05-09
Payer: COMMERCIAL

## 2019-05-09 VITALS
OXYGEN SATURATION: 96 % | BODY MASS INDEX: 33.38 KG/M2 | DIASTOLIC BLOOD PRESSURE: 68 MMHG | HEIGHT: 65 IN | SYSTOLIC BLOOD PRESSURE: 134 MMHG | RESPIRATION RATE: 12 BRPM | HEART RATE: 118 BPM | WEIGHT: 200.38 LBS | TEMPERATURE: 98.7 F

## 2019-05-09 DIAGNOSIS — Z34.80 PRENATAL CARE, SUBSEQUENT PREGNANCY, UNSPECIFIED TRIMESTER: Primary | ICD-10-CM

## 2019-05-09 LAB
SPECIMEN SOURCE: NORMAL
WET PREP SPEC: NORMAL

## 2019-05-09 PROCEDURE — 87491 CHLMYD TRACH DNA AMP PROBE: CPT | Performed by: FAMILY MEDICINE

## 2019-05-09 PROCEDURE — G0145 SCR C/V CYTO,THINLAYER,RESCR: HCPCS | Performed by: FAMILY MEDICINE

## 2019-05-09 PROCEDURE — G0476 HPV COMBO ASSAY CA SCREEN: HCPCS | Performed by: FAMILY MEDICINE

## 2019-05-09 PROCEDURE — 99207 ZZC FIRST OB VISIT: CPT | Performed by: FAMILY MEDICINE

## 2019-05-09 PROCEDURE — 87210 SMEAR WET MOUNT SALINE/INK: CPT | Performed by: FAMILY MEDICINE

## 2019-05-09 PROCEDURE — 87591 N.GONORRHOEAE DNA AMP PROB: CPT | Performed by: FAMILY MEDICINE

## 2019-05-09 ASSESSMENT — PAIN SCALES - GENERAL: PAINLEVEL: NO PAIN (0)

## 2019-05-09 ASSESSMENT — MIFFLIN-ST. JEOR: SCORE: 1626.36
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
## 2019-05-10 LAB
C TRACH DNA SPEC QL NAA+PROBE: NEGATIVE
N GONORRHOEA DNA SPEC QL NAA+PROBE: NEGATIVE
SPECIMEN SOURCE: NORMAL
SPECIMEN SOURCE: NORMAL

## 2019-05-13 LAB
COPATH REPORT: NORMAL
PAP: NORMAL

## 2019-05-15 LAB
FINAL DIAGNOSIS: NORMAL
HPV HR 12 DNA CVX QL NAA+PROBE: NEGATIVE
HPV16 DNA SPEC QL NAA+PROBE: NEGATIVE
HPV18 DNA SPEC QL NAA+PROBE: NEGATIVE
SPECIMEN DESCRIPTION: NORMAL
SPECIMEN SOURCE CVX/VAG CYTO: NORMAL

## 2019-06-10 ENCOUNTER — PRENATAL OFFICE VISIT (OUTPATIENT)
Dept: FAMILY MEDICINE | Facility: CLINIC | Age: 29
End: 2019-06-10
Payer: COMMERCIAL

## 2019-06-10 VITALS
TEMPERATURE: 99 F | BODY MASS INDEX: 33.93 KG/M2 | HEART RATE: 92 BPM | OXYGEN SATURATION: 98 % | WEIGHT: 204.5 LBS | DIASTOLIC BLOOD PRESSURE: 70 MMHG | SYSTOLIC BLOOD PRESSURE: 108 MMHG | RESPIRATION RATE: 14 BRPM

## 2019-06-10 DIAGNOSIS — Z34.80 SUPERVISION OF OTHER NORMAL PREGNANCY, ANTEPARTUM: Primary | ICD-10-CM

## 2019-06-10 PROCEDURE — 81511 FTL CGEN ABNOR FOUR ANAL: CPT | Mod: 90 | Performed by: FAMILY MEDICINE

## 2019-06-10 PROCEDURE — 36415 COLL VENOUS BLD VENIPUNCTURE: CPT | Performed by: FAMILY MEDICINE

## 2019-06-10 PROCEDURE — 99207 ZZC PRENATAL VISIT: CPT | Performed by: FAMILY MEDICINE

## 2019-06-10 PROCEDURE — 99000 SPECIMEN HANDLING OFFICE-LAB: CPT | Performed by: FAMILY MEDICINE

## 2019-06-10 ASSESSMENT — PAIN SCALES - GENERAL: PAINLEVEL: NO PAIN (0)

## 2019-06-10 NOTE — PROGRESS NOTES
Concerns: no concerns   Doing well.  No concerns today.  Reportable signs and symptoms discussed.  Will schedule anatomy ultrasound.  RTC 4 weeks.      Joel Martinez MD

## 2019-06-13 LAB
# FETUSES US: NORMAL
# FETUSES: 1
AFP ADJ MOM AMN: 1.04
AFP SERPL-MCNC: 20 NG/ML
AGE - REPORTED: 31.8 YR
CURRENT SMOKER: NO
CURRENT SMOKER: NO
DIABETES STATUS PATIENT: NO
FAMILY MEMBER DISEASES HX: NO
FAMILY MEMBER DISEASES HX: NO
GA METHOD: NORMAL
GA METHOD: NORMAL
GA: NORMAL WK
HCG MOM SERPL: 1.17
HCG SERPL-ACNC: NORMAL IU/L
HX OF HEREDITARY DISORDERS: NO
IDDM PATIENT QL: NO
INHIBIN A MOM SERPL: 0.6
INHIBIN A SERPL-MCNC: 113 PG/ML
INTEGRATED SCN PATIENT-IMP: NORMAL
IVF PREGNANCY: NO
LMP START DATE: NORMAL
MONOCHORIONIC TWINS: NO
PATHOLOGY STUDY: NORMAL
PREV FETUS DEFECT: NO
SERVICE CMNT-IMP: NO
SPECIMEN DRAWN SERPL: NORMAL
U ESTRIOL MOM SERPL: 1.27
U ESTRIOL SERPL-MCNC: 0.57 NG/ML
VALPROIC/CARBAMAZEPINE STATUS: NO
WEIGHT UNITS: NORMAL

## 2019-07-08 ENCOUNTER — PRENATAL OFFICE VISIT (OUTPATIENT)
Dept: FAMILY MEDICINE | Facility: CLINIC | Age: 29
End: 2019-07-08
Payer: COMMERCIAL

## 2019-07-08 VITALS
BODY MASS INDEX: 35.56 KG/M2 | DIASTOLIC BLOOD PRESSURE: 58 MMHG | OXYGEN SATURATION: 99 % | SYSTOLIC BLOOD PRESSURE: 106 MMHG | TEMPERATURE: 99 F | RESPIRATION RATE: 12 BRPM | HEART RATE: 99 BPM | WEIGHT: 214.38 LBS

## 2019-07-08 DIAGNOSIS — Z34.80 SUPERVISION OF OTHER NORMAL PREGNANCY, ANTEPARTUM: Primary | ICD-10-CM

## 2019-07-08 PROCEDURE — 99207 ZZC PRENATAL VISIT: CPT | Performed by: FAMILY MEDICINE

## 2019-07-08 ASSESSMENT — PAIN SCALES - GENERAL: PAINLEVEL: NO PAIN (0)

## 2019-07-08 NOTE — PROGRESS NOTES
No nausea/vomiting. No heartburn.  No vaginal bleeding, no contractions/severe cramping, no leakage of fluid.  No vaginal discharge. No dysuria  No headache, vision changes, lower extremity swelling, upper abdominal pain, chest pain, shortness of breath.     We will get her scheduled for her her 20-week ultrasound today.  Joel Martinez MD, MD

## 2019-07-16 ENCOUNTER — MYC MEDICAL ADVICE (OUTPATIENT)
Dept: FAMILY MEDICINE | Facility: CLINIC | Age: 29
End: 2019-07-16

## 2019-07-16 DIAGNOSIS — Z01.84 IMMUNITY STATUS TESTING: Primary | ICD-10-CM

## 2019-07-16 DIAGNOSIS — Z11.1 SCREENING EXAMINATION FOR PULMONARY TUBERCULOSIS: Primary | ICD-10-CM

## 2019-07-16 NOTE — TELEPHONE ENCOUNTER
Per RF- she will need a physical (prenatal visits dont count) and will need to bring paperwork with her that she needs filled out.  Will mychart her back.  Darlene Nathan, [facility]

## 2019-07-16 NOTE — TELEPHONE ENCOUNTER
Patient will have to wait on a polio vaccine she is never had a polio vaccine so there is no reason to do an immune study on her she will not be immune she will have to wait till after she is done breast-feeding to receive the vaccine.  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

## 2019-07-17 ENCOUNTER — MYC MEDICAL ADVICE (OUTPATIENT)
Dept: FAMILY MEDICINE | Facility: CLINIC | Age: 29
End: 2019-07-17

## 2019-07-17 NOTE — TELEPHONE ENCOUNTER
Form is in my pending file on top of my desk. We will fill this out at her appt on 07/29 at 4:00.   Will mychart pt back.  Darlene Nathan, [facility]

## 2019-07-17 NOTE — TELEPHONE ENCOUNTER
Per RF- he did order the tb test and the polio titer. I will mychart pt back. She needs a lab only appt.  Darlene Nathan, [facility]

## 2019-07-22 ENCOUNTER — HOSPITAL ENCOUNTER (OUTPATIENT)
Dept: ULTRASOUND IMAGING | Facility: CLINIC | Age: 29
Discharge: HOME | End: 2019-07-22
Attending: FAMILY MEDICINE | Admitting: FAMILY MEDICINE
Payer: COMMERCIAL

## 2019-07-22 DIAGNOSIS — Z34.80 SUPERVISION OF OTHER NORMAL PREGNANCY, ANTEPARTUM: ICD-10-CM

## 2019-07-22 DIAGNOSIS — Z11.1 SCREENING EXAMINATION FOR PULMONARY TUBERCULOSIS: ICD-10-CM

## 2019-07-22 DIAGNOSIS — Z01.84 IMMUNITY STATUS TESTING: ICD-10-CM

## 2019-07-22 PROCEDURE — 76805 OB US >/= 14 WKS SNGL FETUS: CPT

## 2019-07-22 PROCEDURE — 86658 ENTEROVIRUS ANTIBODY: CPT | Mod: 90 | Performed by: FAMILY MEDICINE

## 2019-07-22 PROCEDURE — 86481 TB AG RESPONSE T-CELL SUSP: CPT | Performed by: FAMILY MEDICINE

## 2019-07-22 PROCEDURE — 99000 SPECIMEN HANDLING OFFICE-LAB: CPT | Performed by: FAMILY MEDICINE

## 2019-07-22 PROCEDURE — 36415 COLL VENOUS BLD VENIPUNCTURE: CPT | Performed by: FAMILY MEDICINE

## 2019-07-24 LAB
GAMMA INTERFERON BACKGROUND BLD IA-ACNC: 0.18 IU/ML
M TB IFN-G BLD-IMP: NEGATIVE
M TB IFN-G CD4+ BCKGRND COR BLD-ACNC: 8.35 IU/ML
MITOGEN IGNF BCKGRD COR BLD-ACNC: 0 IU/ML
MITOGEN IGNF BCKGRD COR BLD-ACNC: 0 IU/ML

## 2019-07-29 ENCOUNTER — OFFICE VISIT (OUTPATIENT)
Dept: FAMILY MEDICINE | Facility: CLINIC | Age: 29
End: 2019-07-29
Payer: COMMERCIAL

## 2019-07-29 VITALS
BODY MASS INDEX: 36 KG/M2 | OXYGEN SATURATION: 98 % | RESPIRATION RATE: 14 BRPM | TEMPERATURE: 97.8 F | HEART RATE: 114 BPM | DIASTOLIC BLOOD PRESSURE: 62 MMHG | SYSTOLIC BLOOD PRESSURE: 106 MMHG | WEIGHT: 217 LBS

## 2019-07-29 DIAGNOSIS — Z00.00 ROUTINE GENERAL MEDICAL EXAMINATION AT A HEALTH CARE FACILITY: Primary | ICD-10-CM

## 2019-07-29 PROCEDURE — 99395 PREV VISIT EST AGE 18-39: CPT | Performed by: FAMILY MEDICINE

## 2019-07-29 ASSESSMENT — ENCOUNTER SYMPTOMS
HEARTBURN: 0
COUGH: 0
DIZZINESS: 0
DIARRHEA: 0
FREQUENCY: 0
NAUSEA: 0
CONSTIPATION: 0
EYE PAIN: 0
ARTHRALGIAS: 0
PALPITATIONS: 0
FEVER: 0
HEMATOCHEZIA: 0
BREAST MASS: 0
CHILLS: 0
HEMATURIA: 0
MYALGIAS: 0
HEADACHES: 0
DYSURIA: 0
NERVOUS/ANXIOUS: 0
PARESTHESIAS: 0
ABDOMINAL PAIN: 0
WEAKNESS: 0
SHORTNESS OF BREATH: 0
JOINT SWELLING: 0
SORE THROAT: 0

## 2019-07-29 ASSESSMENT — PAIN SCALES - GENERAL: PAINLEVEL: NO PAIN (0)
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
## 2019-07-30 LAB
PV1 NAB TITR SER NT: NORMAL {TITER}
PV3 NAB TITR SER NT: NORMAL {TITER}

## 2019-08-08 ENCOUNTER — PRENATAL OFFICE VISIT (OUTPATIENT)
Dept: FAMILY MEDICINE | Facility: CLINIC | Age: 29
End: 2019-08-08
Payer: COMMERCIAL

## 2019-08-08 VITALS
WEIGHT: 218.8 LBS | SYSTOLIC BLOOD PRESSURE: 108 MMHG | HEART RATE: 110 BPM | TEMPERATURE: 97.5 F | RESPIRATION RATE: 16 BRPM | BODY MASS INDEX: 36.3 KG/M2 | DIASTOLIC BLOOD PRESSURE: 62 MMHG | OXYGEN SATURATION: 99 %

## 2019-08-08 DIAGNOSIS — Z34.80 SUPERVISION OF OTHER NORMAL PREGNANCY: Primary | ICD-10-CM

## 2019-08-08 LAB
GLUCOSE 1H P 50 G GLC PO SERPL-MCNC: 88 MG/DL (ref 60–129)
HGB BLD-MCNC: 11.4 G/DL (ref 11.7–15.7)

## 2019-08-08 PROCEDURE — 36415 COLL VENOUS BLD VENIPUNCTURE: CPT | Performed by: FAMILY MEDICINE

## 2019-08-08 PROCEDURE — 82950 GLUCOSE TEST: CPT | Performed by: FAMILY MEDICINE

## 2019-08-08 PROCEDURE — 00000218 ZZHCL STATISTIC OBHBG - HEMOGLOBIN: Performed by: FAMILY MEDICINE

## 2019-08-08 PROCEDURE — 99207 ZZC PRENATAL VISIT: CPT | Performed by: FAMILY MEDICINE

## 2019-08-08 PROCEDURE — 86780 TREPONEMA PALLIDUM: CPT | Performed by: FAMILY MEDICINE

## 2019-08-08 NOTE — PROGRESS NOTES
Told pt. to return or call if greater than 6 ctx's per hour, leaking of fluid, or increased pelvic pressure.  Pt. to call if any questions or concerns and to stay well hydrated.

## 2019-08-09 LAB — T PALLIDUM AB SER QL: NONREACTIVE

## 2019-09-09 ENCOUNTER — PRENATAL OFFICE VISIT (OUTPATIENT)
Dept: FAMILY MEDICINE | Facility: CLINIC | Age: 29
End: 2019-09-09
Payer: COMMERCIAL

## 2019-09-09 VITALS
TEMPERATURE: 96.6 F | SYSTOLIC BLOOD PRESSURE: 116 MMHG | BODY MASS INDEX: 37.49 KG/M2 | WEIGHT: 226 LBS | DIASTOLIC BLOOD PRESSURE: 78 MMHG | OXYGEN SATURATION: 96 % | RESPIRATION RATE: 18 BRPM | HEART RATE: 114 BPM

## 2019-09-09 DIAGNOSIS — Z34.80 SUPERVISION OF OTHER NORMAL PREGNANCY: Primary | ICD-10-CM

## 2019-09-09 PROCEDURE — 99207 ZZC PRENATAL VISIT: CPT | Performed by: FAMILY MEDICINE

## 2019-09-09 ASSESSMENT — PAIN SCALES - GENERAL: PAINLEVEL: NO PAIN (0)

## 2019-09-09 NOTE — PROGRESS NOTES
Concerns: none   No vaginal bleeding, no contractions, no leakage of fluid  No nausea/vomiting. No heartburn  No vaginal discharge. No dysuria.   No headache, vision changes, lower extremity swelling, upper abdominal pain, chest pain, shortness of breath  Tdap planned next visit  Discussed PTL, PROM, and when to call or come in.  Normal anatomy ultrasound.  RTC 4 weeks.  GTT and labs today       Joel Martinez MD, MD

## 2019-10-07 ENCOUNTER — PRENATAL OFFICE VISIT (OUTPATIENT)
Dept: FAMILY MEDICINE | Facility: CLINIC | Age: 29
End: 2019-10-07
Payer: MEDICAID

## 2019-10-07 VITALS
BODY MASS INDEX: 38.49 KG/M2 | HEART RATE: 91 BPM | WEIGHT: 232 LBS | TEMPERATURE: 98.4 F | SYSTOLIC BLOOD PRESSURE: 130 MMHG | OXYGEN SATURATION: 98 % | DIASTOLIC BLOOD PRESSURE: 74 MMHG | RESPIRATION RATE: 16 BRPM

## 2019-10-07 DIAGNOSIS — Z34.80 SUPERVISION OF OTHER NORMAL PREGNANCY: Primary | ICD-10-CM

## 2019-10-07 PROCEDURE — 99207 ZZC PRENATAL VISIT: CPT | Performed by: FAMILY MEDICINE

## 2019-10-07 PROCEDURE — 90715 TDAP VACCINE 7 YRS/> IM: CPT | Performed by: FAMILY MEDICINE

## 2019-10-07 PROCEDURE — 90471 IMMUNIZATION ADMIN: CPT | Performed by: FAMILY MEDICINE

## 2019-10-07 ASSESSMENT — PAIN SCALES - GENERAL: PAINLEVEL: NO PAIN (0)

## 2019-10-07 NOTE — NURSING NOTE
Prior to injection verified patient identity using patient's name and date of birth.   Patient instructed to remain in clinic for 20 minutes afterwards and to report any adverse reaction to me immediately.  Darlene Nathan, [facility]

## 2019-10-07 NOTE — PROGRESS NOTES
Concerns: none   Doing well.  No concerns today.  No vaginal bleeding, LOF.  No contractions.  Discussed kick counts and fetal movement.  Reportable signs and symptoms discussed.  Discussed kick counts and fetal movement.  Discussed PTL, PROM, and when to call or come in.  RTC 2 weeks.    Joel Martinez MD, MD

## 2019-10-21 ENCOUNTER — PRENATAL OFFICE VISIT (OUTPATIENT)
Dept: FAMILY MEDICINE | Facility: CLINIC | Age: 29
End: 2019-10-21

## 2019-10-21 VITALS
TEMPERATURE: 98.1 F | WEIGHT: 234.38 LBS | RESPIRATION RATE: 12 BRPM | OXYGEN SATURATION: 96 % | BODY MASS INDEX: 38.88 KG/M2 | DIASTOLIC BLOOD PRESSURE: 74 MMHG | SYSTOLIC BLOOD PRESSURE: 112 MMHG | HEART RATE: 87 BPM

## 2019-10-21 DIAGNOSIS — Z34.80 SUPERVISION OF OTHER NORMAL PREGNANCY: Primary | ICD-10-CM

## 2019-10-21 PROCEDURE — 99207 ZZC PRENATAL VISIT: CPT | Performed by: FAMILY MEDICINE

## 2019-10-21 ASSESSMENT — PAIN SCALES - GENERAL: PAINLEVEL: MILD PAIN (3)

## 2019-10-21 NOTE — PROGRESS NOTES
Concerns: none   Doing well.  No concerns today.  No vaginal bleeding, LOF.  No contractions.  Discussed kick counts and fetal movement.  Reportable signs and symptoms discussed.  Discussed kick counts and fetal movement.  Discussed PTL, PROM, and when to call or come in.  RTC 2 weeks.    Joel Martinez MD

## 2019-10-25 ENCOUNTER — IMMUNIZATION (OUTPATIENT)
Dept: FAMILY MEDICINE | Facility: OTHER | Age: 29
End: 2019-10-25
Payer: MEDICAID

## 2019-10-25 PROCEDURE — 90686 IIV4 VACC NO PRSV 0.5 ML IM: CPT

## 2019-10-25 PROCEDURE — 90471 IMMUNIZATION ADMIN: CPT

## 2019-10-26 ENCOUNTER — HEALTH MAINTENANCE LETTER (OUTPATIENT)
Age: 29
End: 2019-10-26

## 2019-11-04 ENCOUNTER — PRENATAL OFFICE VISIT (OUTPATIENT)
Dept: FAMILY MEDICINE | Facility: CLINIC | Age: 29
End: 2019-11-04

## 2019-11-04 VITALS
SYSTOLIC BLOOD PRESSURE: 136 MMHG | HEART RATE: 89 BPM | RESPIRATION RATE: 14 BRPM | DIASTOLIC BLOOD PRESSURE: 70 MMHG | WEIGHT: 239.25 LBS | OXYGEN SATURATION: 98 % | BODY MASS INDEX: 39.69 KG/M2 | TEMPERATURE: 97.8 F

## 2019-11-04 DIAGNOSIS — Z34.80 SUPERVISION OF OTHER NORMAL PREGNANCY: Primary | ICD-10-CM

## 2019-11-04 PROCEDURE — 99207 ZZC PRENATAL VISIT: CPT | Performed by: FAMILY MEDICINE

## 2019-11-04 ASSESSMENT — PAIN SCALES - GENERAL: PAINLEVEL: NO PAIN (0)

## 2019-11-06 ENCOUNTER — MYC MEDICAL ADVICE (OUTPATIENT)
Dept: FAMILY MEDICINE | Facility: CLINIC | Age: 29
End: 2019-11-06

## 2019-11-11 ENCOUNTER — PRENATAL OFFICE VISIT (OUTPATIENT)
Dept: FAMILY MEDICINE | Facility: CLINIC | Age: 29
End: 2019-11-11

## 2019-11-11 VITALS
OXYGEN SATURATION: 97 % | DIASTOLIC BLOOD PRESSURE: 72 MMHG | SYSTOLIC BLOOD PRESSURE: 122 MMHG | WEIGHT: 240 LBS | TEMPERATURE: 98.4 F | HEART RATE: 94 BPM | RESPIRATION RATE: 13 BRPM | BODY MASS INDEX: 39.82 KG/M2

## 2019-11-11 DIAGNOSIS — Z34.80 SUPERVISION OF OTHER NORMAL PREGNANCY: Primary | ICD-10-CM

## 2019-11-11 PROCEDURE — 99207 ZZC PRENATAL VISIT: CPT | Performed by: FAMILY MEDICINE

## 2019-11-11 PROCEDURE — 87653 STREP B DNA AMP PROBE: CPT | Performed by: FAMILY MEDICINE

## 2019-11-11 ASSESSMENT — PAIN SCALES - GENERAL: PAINLEVEL: NO PAIN (0)

## 2019-11-11 NOTE — PROGRESS NOTES
Concerns: none  Doing well.  No concerns today. Normal fetal movement.  No vaginal bleeding or LOF. Has some painful contractions with occasional pelvic pressure.  No cramping or abnormal discharge; no leg swelling  No HA, RUQ pain, N/V, visual changes.  Cephalic position confirmed by Leopold maneuvers.  Discussed signs of labor and when to call or come in.  Discussed kick counts and fetal movement.  Reportable signs and symptoms discussed.  GBS done today.  Labor precautions discussed.  Continue with prenatal vitamin daily and drinks a lot of water.  RTC 1 week.   Prenatal flowsheet information is reviewed.      This document serves as a record of the services and decisions personally performed and made by Joel Martinez MD.  It was created on his behalf by Feli Campos, a trained medical student and scribe.  The creation of this record is based on the provider's personal observations and the statements of the patient.     Feli Campos, MS3  November 11, 2019         I agree with the PFSH and ROS as completed by the MS, .  The remainder of the encounter was performed by me and scribed by the MS.  The scribed note accurately reflects my personal services and the decisions made by me.      Joel Martinez MD

## 2019-11-12 LAB
GP B STREP DNA SPEC QL NAA+PROBE: NEGATIVE
SPECIMEN SOURCE: NORMAL

## 2019-11-18 ENCOUNTER — PRENATAL OFFICE VISIT (OUTPATIENT)
Dept: FAMILY MEDICINE | Facility: CLINIC | Age: 29
End: 2019-11-18
Payer: COMMERCIAL

## 2019-11-18 VITALS
BODY MASS INDEX: 40.17 KG/M2 | WEIGHT: 242.13 LBS | TEMPERATURE: 98.3 F | HEART RATE: 89 BPM | SYSTOLIC BLOOD PRESSURE: 128 MMHG | RESPIRATION RATE: 16 BRPM | DIASTOLIC BLOOD PRESSURE: 70 MMHG | OXYGEN SATURATION: 97 %

## 2019-11-18 DIAGNOSIS — Z34.80 SUPERVISION OF OTHER NORMAL PREGNANCY: Primary | ICD-10-CM

## 2019-11-18 PROCEDURE — 99207 ZZC PRENATAL VISIT: CPT | Performed by: FAMILY MEDICINE

## 2019-11-18 ASSESSMENT — PAIN SCALES - GENERAL: PAINLEVEL: NO PAIN (0)

## 2019-11-18 NOTE — PROGRESS NOTES
Concerns: none   No vaginal bleeding, LOF, contractions.  No HA, RUQ pain, N/V, visual changes.  Discussed kick counts and fetal movement.  Reportable signs and symptoms discussed.  Labor precautions discussed.  RTC 1 week.    Joel Martinez MD

## 2019-11-25 ENCOUNTER — PRENATAL OFFICE VISIT (OUTPATIENT)
Dept: FAMILY MEDICINE | Facility: CLINIC | Age: 29
End: 2019-11-25
Payer: COMMERCIAL

## 2019-11-25 VITALS
OXYGEN SATURATION: 98 % | BODY MASS INDEX: 40.87 KG/M2 | HEART RATE: 87 BPM | DIASTOLIC BLOOD PRESSURE: 72 MMHG | RESPIRATION RATE: 16 BRPM | SYSTOLIC BLOOD PRESSURE: 130 MMHG | WEIGHT: 246.38 LBS | TEMPERATURE: 98.7 F

## 2019-11-25 DIAGNOSIS — Z34.80 SUPERVISION OF OTHER NORMAL PREGNANCY: Primary | ICD-10-CM

## 2019-11-25 PROCEDURE — 99207 ZZC PRENATAL VISIT: CPT | Performed by: FAMILY MEDICINE

## 2019-11-25 ASSESSMENT — PAIN SCALES - GENERAL: PAINLEVEL: NO PAIN (0)

## 2019-11-27 ENCOUNTER — PRENATAL OFFICE VISIT (OUTPATIENT)
Dept: FAMILY MEDICINE | Facility: CLINIC | Age: 29
End: 2019-11-27
Payer: COMMERCIAL

## 2019-11-27 VITALS
SYSTOLIC BLOOD PRESSURE: 126 MMHG | OXYGEN SATURATION: 95 % | RESPIRATION RATE: 26 BRPM | WEIGHT: 244.6 LBS | TEMPERATURE: 98.4 F | DIASTOLIC BLOOD PRESSURE: 80 MMHG | HEART RATE: 104 BPM | BODY MASS INDEX: 40.58 KG/M2

## 2019-11-27 DIAGNOSIS — Z34.80 SUPERVISION OF OTHER NORMAL PREGNANCY: Primary | ICD-10-CM

## 2019-11-27 PROCEDURE — 99207 ZZC PRENATAL VISIT: CPT | Performed by: FAMILY MEDICINE

## 2019-11-27 ASSESSMENT — PAIN SCALES - GENERAL: PAINLEVEL: NO PAIN (0)

## 2019-11-27 NOTE — PROGRESS NOTES
Concerns: none   No vaginal bleeding, LOF, contractions.  No HA, RUQ pain, N/V, visual changes.  Labor precautions discussed.  RTC 1 week.    Joel Martinez MD

## 2019-12-01 ENCOUNTER — HOSPITAL ENCOUNTER (OUTPATIENT)
Facility: CLINIC | Age: 29
Discharge: HOME | End: 2019-12-01
Attending: FAMILY MEDICINE | Admitting: FAMILY MEDICINE
Payer: COMMERCIAL

## 2019-12-01 VITALS — RESPIRATION RATE: 19 BRPM | TEMPERATURE: 98.1 F | DIASTOLIC BLOOD PRESSURE: 69 MMHG | SYSTOLIC BLOOD PRESSURE: 138 MMHG

## 2019-12-01 PROBLEM — Z36.89 ENCOUNTER FOR TRIAGE IN PREGNANT PATIENT: Status: ACTIVE | Noted: 2019-12-01

## 2019-12-01 LAB — RUPTURE OF FETAL MEMBRANES BY ROM PLUS: NEGATIVE

## 2019-12-01 PROCEDURE — G0463 HOSPITAL OUTPT CLINIC VISIT: HCPCS | Mod: 25

## 2019-12-01 PROCEDURE — G0463 HOSPITAL OUTPT CLINIC VISIT: HCPCS

## 2019-12-01 PROCEDURE — 84112 EVAL AMNIOTIC FLUID PROTEIN: CPT | Performed by: FAMILY MEDICINE

## 2019-12-01 NOTE — PROGRESS NOTES
S:  Discharge from triage.   A:  FHT's 135, Moderate variability, Accels present, no decels noted. Contractions 0.  Cervical exam 2  R:  Written and verbal D/C instruction provided. Pt. Verbalized understanding of D/C instructions and will follow up with provider as previously scheduled.   Verbalizes understanding that she will call or return to [facility] with any further questions or concerns.   Pt. D/C'd via walk with spouse    Nursing Discharge Checklist  Discharge order entered: Yes  Patient care order entered: Yes  Charges entered: Yes  IV start and stop times have been documented in Epic? No  NST start and stop times have been documented in Epic Doc F/S? No

## 2019-12-01 NOTE — DISCHARGE INSTRUCTIONS
OB Triage Discharge Instructions    Diet:  Regular diet as tolerated    Activity:  As tolerated    Other Special Instructions: follow up tmrw as scheduled, spouse to take wife out to dinner since the kids are at the grandparents.    Reason for being seen in L&D: leaking    Treatment/procedures performed in L&D: labs, monitor    Call the [facility] at [phone] if you have:  ? 5 or more contractions in one hour  ? Leaking of fluid from your vaginal area  ? Decreased fetal movement (follow kick count instructions)  ? Bleeding from your vaginal area  ? Swelling in your face, or increased swelling in your hands or legs  ? Headaches or vision problems such as blurring or seeing spots or starts  ? Nausea or vomiting lasting for more than 24 hours  ? An increase in weight (5lbs/week)  ? Epigastric pain (sometimes confused with heartburn that does not go away or a very bad stomach ache)    If you have any questions, please follow up with your doctor.        Patient Signature: ______________________________________________________________  By signing the above I acknowledge that a nurse or my care provider has explained the instruction to me and I have had any questions regarding my care explained to me.        Discharge Nurse Signature: _______________________________ 12/1/2019  4:31 PM    Method of discharge: walk    Accompanied by: spouse  Time of discharge: 430

## 2019-12-01 NOTE — PROGRESS NOTES
S: Triage Arrival  B: Wyatt is a 29 y.o.  @ 39w 1d who presents with complaint of leaking fluid vaginally , started this afternoon  A: EFM applied. FHT's145 with moderate variability, accels present, no decels noted on strip. Contractions 0  R: Will notify MD to obtain further orders.

## 2019-12-02 ENCOUNTER — PRENATAL OFFICE VISIT (OUTPATIENT)
Dept: FAMILY MEDICINE | Facility: CLINIC | Age: 29
End: 2019-12-02
Payer: COMMERCIAL

## 2019-12-02 VITALS
DIASTOLIC BLOOD PRESSURE: 76 MMHG | SYSTOLIC BLOOD PRESSURE: 130 MMHG | TEMPERATURE: 98.5 F | OXYGEN SATURATION: 98 % | HEART RATE: 90 BPM | WEIGHT: 245.5 LBS | BODY MASS INDEX: 40.73 KG/M2 | RESPIRATION RATE: 15 BRPM

## 2019-12-02 DIAGNOSIS — Z34.80 SUPERVISION OF OTHER NORMAL PREGNANCY: Primary | ICD-10-CM

## 2019-12-02 PROCEDURE — 99207 ZZC PRENATAL VISIT: CPT | Performed by: FAMILY MEDICINE

## 2019-12-02 RX ORDER — OXYTOCIN 10 [USP'U]/ML
10 INJECTION, SOLUTION INTRAMUSCULAR; INTRAVENOUS
Status: CANCELLED | OUTPATIENT
Start: 2019-12-02

## 2019-12-02 RX ORDER — IBUPROFEN 400 MG/1
800 TABLET, FILM COATED ORAL
Status: CANCELLED | OUTPATIENT
Start: 2019-12-02

## 2019-12-02 RX ORDER — OXYTOCIN/0.9 % SODIUM CHLORIDE 30/500 ML
1-24 PLASTIC BAG, INJECTION (ML) INTRAVENOUS CONTINUOUS
Status: CANCELLED | OUTPATIENT
Start: 2019-12-02

## 2019-12-02 RX ORDER — CARBOPROST TROMETHAMINE 250 UG/ML
250 INJECTION, SOLUTION INTRAMUSCULAR
Status: CANCELLED | OUTPATIENT
Start: 2019-12-02

## 2019-12-02 RX ORDER — NALOXONE HYDROCHLORIDE 0.4 MG/ML
.1-.4 INJECTION, SOLUTION INTRAMUSCULAR; INTRAVENOUS; SUBCUTANEOUS
Status: CANCELLED | OUTPATIENT
Start: 2019-12-02

## 2019-12-02 RX ORDER — SODIUM CHLORIDE, SODIUM LACTATE, POTASSIUM CHLORIDE, CALCIUM CHLORIDE 600; 310; 30; 20 MG/100ML; MG/100ML; MG/100ML; MG/100ML
INJECTION, SOLUTION INTRAVENOUS CONTINUOUS
Status: CANCELLED | OUTPATIENT
Start: 2019-12-02

## 2019-12-02 RX ORDER — ONDANSETRON 2 MG/ML
4 INJECTION INTRAMUSCULAR; INTRAVENOUS EVERY 6 HOURS PRN
Status: CANCELLED | OUTPATIENT
Start: 2019-12-02

## 2019-12-02 RX ORDER — OXYTOCIN/0.9 % SODIUM CHLORIDE 30/500 ML
100-340 PLASTIC BAG, INJECTION (ML) INTRAVENOUS CONTINUOUS PRN
Status: CANCELLED | OUTPATIENT
Start: 2019-12-02

## 2019-12-02 RX ORDER — OXYCODONE AND ACETAMINOPHEN 5; 325 MG/1; MG/1
1 TABLET ORAL
Status: CANCELLED | OUTPATIENT
Start: 2019-12-02

## 2019-12-02 RX ORDER — ACETAMINOPHEN 325 MG/1
650 TABLET ORAL EVERY 4 HOURS PRN
Status: CANCELLED | OUTPATIENT
Start: 2019-12-02

## 2019-12-02 RX ORDER — LIDOCAINE 40 MG/G
CREAM TOPICAL
Status: CANCELLED | OUTPATIENT
Start: 2019-12-02

## 2019-12-02 RX ORDER — METHYLERGONOVINE MALEATE 0.2 MG/ML
200 INJECTION INTRAVENOUS
Status: CANCELLED | OUTPATIENT
Start: 2019-12-02

## 2019-12-02 RX ORDER — FENTANYL CITRATE 50 UG/ML
50-100 INJECTION, SOLUTION INTRAMUSCULAR; INTRAVENOUS
Status: CANCELLED | OUTPATIENT
Start: 2019-12-02

## 2019-12-02 ASSESSMENT — PAIN SCALES - GENERAL: PAINLEVEL: NO PAIN (0)

## 2019-12-02 NOTE — PROGRESS NOTES
Concerns: marked increased in leticia leung   No vaginal bleeding, LOF, contractions.  No HA, RUQ pain, N/V, visual changes.  Cervix is favorable .  Discussed indications, risks, complications and failure rate of inductions.  Labor precautions discussed.  RTC 1 week.    Joel Martinez MD

## 2019-12-04 ENCOUNTER — ANESTHESIA (OUTPATIENT)
Dept: OBGYN | Facility: CLINIC | Age: 29
End: 2019-12-04
Payer: COMMERCIAL

## 2019-12-04 ENCOUNTER — HOSPITAL ENCOUNTER (INPATIENT)
Facility: CLINIC | Age: 29
LOS: 1 days | Discharge: HOME | End: 2019-12-05
Attending: OBSTETRICS & GYNECOLOGY | Admitting: FAMILY MEDICINE
Payer: COMMERCIAL

## 2019-12-04 ENCOUNTER — ANESTHESIA EVENT (OUTPATIENT)
Dept: OBGYN | Facility: CLINIC | Age: 29
End: 2019-12-04
Payer: COMMERCIAL

## 2019-12-04 PROBLEM — Z34.80 SUPERVISION OF OTHER NORMAL PREGNANCY, ANTEPARTUM: Status: ACTIVE | Noted: 2019-12-04

## 2019-12-04 LAB
ABO + RH BLD: NORMAL
ABO + RH BLD: NORMAL
SPECIMEN EXP DATE BLD: NORMAL
T PALLIDUM AB SER QL: NONREACTIVE

## 2019-12-04 PROCEDURE — 3E0R3BZ INTRODUCTION OF ANESTHETIC AGENT INTO SPINAL CANAL, PERCUTANEOUS APPROACH: ICD-10-PCS | Performed by: NURSE ANESTHETIST, CERTIFIED REGISTERED

## 2019-12-04 PROCEDURE — 00HU33Z INSERTION OF INFUSION DEVICE INTO SPINAL CANAL, PERCUTANEOUS APPROACH: ICD-10-PCS | Performed by: NURSE ANESTHETIST, CERTIFIED REGISTERED

## 2019-12-04 PROCEDURE — 25000125 ZZHC RX 250: Performed by: FAMILY MEDICINE

## 2019-12-04 PROCEDURE — 12000000 ZZH R&B MED SURG/OB

## 2019-12-04 PROCEDURE — 25000128 H RX IP 250 OP 636: Performed by: FAMILY MEDICINE

## 2019-12-04 PROCEDURE — 25000125 ZZHC RX 250: Performed by: NURSE ANESTHETIST, CERTIFIED REGISTERED

## 2019-12-04 PROCEDURE — 86901 BLOOD TYPING SEROLOGIC RH(D): CPT | Performed by: FAMILY MEDICINE

## 2019-12-04 PROCEDURE — 25000128 H RX IP 250 OP 636: Performed by: NURSE ANESTHETIST, CERTIFIED REGISTERED

## 2019-12-04 PROCEDURE — 3E033VJ INTRODUCTION OF OTHER HORMONE INTO PERIPHERAL VEIN, PERCUTANEOUS APPROACH: ICD-10-PCS | Performed by: FAMILY MEDICINE

## 2019-12-04 PROCEDURE — 86900 BLOOD TYPING SEROLOGIC ABO: CPT | Performed by: FAMILY MEDICINE

## 2019-12-04 PROCEDURE — 59400 OBSTETRICAL CARE: CPT | Performed by: FAMILY MEDICINE

## 2019-12-04 PROCEDURE — 25800030 ZZH RX IP 258 OP 636: Performed by: FAMILY MEDICINE

## 2019-12-04 PROCEDURE — 72200001 ZZH LABOR CARE VAGINAL DELIVERY SINGLE

## 2019-12-04 PROCEDURE — 25000125 ZZHC RX 250

## 2019-12-04 PROCEDURE — 86780 TREPONEMA PALLIDUM: CPT | Performed by: FAMILY MEDICINE

## 2019-12-04 PROCEDURE — 25000132 ZZH RX MED GY IP 250 OP 250 PS 637: Performed by: FAMILY MEDICINE

## 2019-12-04 PROCEDURE — 40000671 ZZH STATISTIC ANESTHESIA CASE

## 2019-12-04 RX ORDER — LIDOCAINE HYDROCHLORIDE 20 MG/ML
INJECTION, SOLUTION INFILTRATION; PERINEURAL PRN
Status: DISCONTINUED | OUTPATIENT
Start: 2019-12-04 | End: 2019-12-04

## 2019-12-04 RX ORDER — HYDROCORTISONE 2.5 %
CREAM (GRAM) TOPICAL 3 TIMES DAILY PRN
Status: DISCONTINUED | OUTPATIENT
Start: 2019-12-04 | End: 2019-12-05 | Stop reason: HOSPADM

## 2019-12-04 RX ORDER — SODIUM CHLORIDE, SODIUM LACTATE, POTASSIUM CHLORIDE, CALCIUM CHLORIDE 600; 310; 30; 20 MG/100ML; MG/100ML; MG/100ML; MG/100ML
INJECTION, SOLUTION INTRAVENOUS CONTINUOUS
Status: DISCONTINUED | OUTPATIENT
Start: 2019-12-04 | End: 2019-12-04

## 2019-12-04 RX ORDER — NALOXONE HYDROCHLORIDE 0.4 MG/ML
.1-.4 INJECTION, SOLUTION INTRAMUSCULAR; INTRAVENOUS; SUBCUTANEOUS
Status: DISCONTINUED | OUTPATIENT
Start: 2019-12-04 | End: 2019-12-04

## 2019-12-04 RX ORDER — OXYTOCIN/0.9 % SODIUM CHLORIDE 30/500 ML
100 PLASTIC BAG, INJECTION (ML) INTRAVENOUS CONTINUOUS
Status: DISCONTINUED | OUTPATIENT
Start: 2019-12-04 | End: 2019-12-05 | Stop reason: HOSPADM

## 2019-12-04 RX ORDER — OXYTOCIN/0.9 % SODIUM CHLORIDE 30/500 ML
100-340 PLASTIC BAG, INJECTION (ML) INTRAVENOUS CONTINUOUS PRN
Status: DISCONTINUED | OUTPATIENT
Start: 2019-12-04 | End: 2019-12-04

## 2019-12-04 RX ORDER — BUPIVACAINE HYDROCHLORIDE 2.5 MG/ML
INJECTION, SOLUTION INFILTRATION; PERINEURAL PRN
Status: DISCONTINUED | OUTPATIENT
Start: 2019-12-04 | End: 2019-12-04

## 2019-12-04 RX ORDER — METHYLERGONOVINE MALEATE 0.2 MG/ML
200 INJECTION INTRAVENOUS
Status: DISCONTINUED | OUTPATIENT
Start: 2019-12-04 | End: 2019-12-04

## 2019-12-04 RX ORDER — ACETAMINOPHEN 325 MG/1
650 TABLET ORAL EVERY 4 HOURS PRN
Status: DISCONTINUED | OUTPATIENT
Start: 2019-12-04 | End: 2019-12-04

## 2019-12-04 RX ORDER — NALBUPHINE HYDROCHLORIDE 10 MG/ML
2.5-5 INJECTION, SOLUTION INTRAMUSCULAR; INTRAVENOUS; SUBCUTANEOUS EVERY 6 HOURS PRN
Status: DISCONTINUED | OUTPATIENT
Start: 2019-12-04 | End: 2019-12-04

## 2019-12-04 RX ORDER — FENTANYL CITRATE 50 UG/ML
50-100 INJECTION, SOLUTION INTRAMUSCULAR; INTRAVENOUS
Status: DISCONTINUED | OUTPATIENT
Start: 2019-12-04 | End: 2019-12-04

## 2019-12-04 RX ORDER — OXYCODONE AND ACETAMINOPHEN 5; 325 MG/1; MG/1
1 TABLET ORAL
Status: DISCONTINUED | OUTPATIENT
Start: 2019-12-04 | End: 2019-12-04

## 2019-12-04 RX ORDER — BISACODYL 10 MG
10 SUPPOSITORY, RECTAL RECTAL DAILY PRN
Status: DISCONTINUED | OUTPATIENT
Start: 2019-12-06 | End: 2019-12-05 | Stop reason: HOSPADM

## 2019-12-04 RX ORDER — OXYTOCIN/0.9 % SODIUM CHLORIDE 30/500 ML
1-24 PLASTIC BAG, INJECTION (ML) INTRAVENOUS CONTINUOUS
Status: DISCONTINUED | OUTPATIENT
Start: 2019-12-04 | End: 2019-12-04

## 2019-12-04 RX ORDER — ACETAMINOPHEN 325 MG/1
650 TABLET ORAL EVERY 4 HOURS PRN
Status: DISCONTINUED | OUTPATIENT
Start: 2019-12-04 | End: 2019-12-05 | Stop reason: HOSPADM

## 2019-12-04 RX ORDER — AMOXICILLIN 250 MG
2 CAPSULE ORAL 2 TIMES DAILY
Status: DISCONTINUED | OUTPATIENT
Start: 2019-12-04 | End: 2019-12-05 | Stop reason: HOSPADM

## 2019-12-04 RX ORDER — LIDOCAINE HYDROCHLORIDE 10 MG/ML
INJECTION, SOLUTION EPIDURAL; INFILTRATION; INTRACAUDAL; PERINEURAL
Status: COMPLETED
Start: 2019-12-04 | End: 2019-12-04

## 2019-12-04 RX ORDER — OXYTOCIN 10 [USP'U]/ML
10 INJECTION, SOLUTION INTRAMUSCULAR; INTRAVENOUS
Status: DISCONTINUED | OUTPATIENT
Start: 2019-12-04 | End: 2019-12-05 | Stop reason: HOSPADM

## 2019-12-04 RX ORDER — LIDOCAINE 40 MG/G
CREAM TOPICAL
Status: DISCONTINUED | OUTPATIENT
Start: 2019-12-04 | End: 2019-12-04

## 2019-12-04 RX ORDER — LIDOCAINE HYDROCHLORIDE 10 MG/ML
INJECTION, SOLUTION EPIDURAL; INFILTRATION; INTRACAUDAL; PERINEURAL
Status: DISCONTINUED
Start: 2019-12-04 | End: 2019-12-04 | Stop reason: WASHOUT

## 2019-12-04 RX ORDER — LIDOCAINE HYDROCHLORIDE AND EPINEPHRINE 15; 5 MG/ML; UG/ML
INJECTION, SOLUTION EPIDURAL PRN
Status: DISCONTINUED | OUTPATIENT
Start: 2019-12-04 | End: 2019-12-04

## 2019-12-04 RX ORDER — OXYTOCIN 10 [USP'U]/ML
10 INJECTION, SOLUTION INTRAMUSCULAR; INTRAVENOUS
Status: DISCONTINUED | OUTPATIENT
Start: 2019-12-04 | End: 2019-12-04

## 2019-12-04 RX ORDER — CARBOPROST TROMETHAMINE 250 UG/ML
250 INJECTION, SOLUTION INTRAMUSCULAR
Status: DISCONTINUED | OUTPATIENT
Start: 2019-12-04 | End: 2019-12-04

## 2019-12-04 RX ORDER — IBUPROFEN 800 MG/1
800 TABLET, FILM COATED ORAL EVERY 6 HOURS PRN
Status: DISCONTINUED | OUTPATIENT
Start: 2019-12-04 | End: 2019-12-05 | Stop reason: HOSPADM

## 2019-12-04 RX ORDER — ONDANSETRON 2 MG/ML
4 INJECTION INTRAMUSCULAR; INTRAVENOUS EVERY 6 HOURS PRN
Status: DISCONTINUED | OUTPATIENT
Start: 2019-12-04 | End: 2019-12-04

## 2019-12-04 RX ORDER — IBUPROFEN 800 MG/1
800 TABLET, FILM COATED ORAL
Status: DISCONTINUED | OUTPATIENT
Start: 2019-12-04 | End: 2019-12-04

## 2019-12-04 RX ORDER — AMOXICILLIN 250 MG
1 CAPSULE ORAL 2 TIMES DAILY
Status: DISCONTINUED | OUTPATIENT
Start: 2019-12-04 | End: 2019-12-05 | Stop reason: HOSPADM

## 2019-12-04 RX ORDER — NALOXONE HYDROCHLORIDE 0.4 MG/ML
.1-.4 INJECTION, SOLUTION INTRAMUSCULAR; INTRAVENOUS; SUBCUTANEOUS
Status: DISCONTINUED | OUTPATIENT
Start: 2019-12-04 | End: 2019-12-05 | Stop reason: HOSPADM

## 2019-12-04 RX ORDER — PHENYLEPHRINE HCL IN 0.9% NACL 1 MG/10 ML
100 SYRINGE (ML) INTRAVENOUS EVERY 5 MIN PRN
Status: DISCONTINUED | OUTPATIENT
Start: 2019-12-04 | End: 2019-12-04

## 2019-12-04 RX ORDER — OXYTOCIN/0.9 % SODIUM CHLORIDE 30/500 ML
340 PLASTIC BAG, INJECTION (ML) INTRAVENOUS CONTINUOUS PRN
Status: DISCONTINUED | OUTPATIENT
Start: 2019-12-04 | End: 2019-12-05 | Stop reason: HOSPADM

## 2019-12-04 RX ORDER — LANOLIN 100 %
OINTMENT (GRAM) TOPICAL
Status: DISCONTINUED | OUTPATIENT
Start: 2019-12-04 | End: 2019-12-05 | Stop reason: HOSPADM

## 2019-12-04 RX ADMIN — LIDOCAINE HYDROCHLORIDE 0.1 ML: 10 INJECTION, SOLUTION EPIDURAL; INFILTRATION; INTRACAUDAL; PERINEURAL at 08:11

## 2019-12-04 RX ADMIN — LIDOCAINE HYDROCHLORIDE,EPINEPHRINE BITARTRATE 3 ML: 15; .005 INJECTION, SOLUTION EPIDURAL; INFILTRATION; INTRACAUDAL; PERINEURAL at 14:31

## 2019-12-04 RX ADMIN — LIDOCAINE HYDROCHLORIDE,EPINEPHRINE BITARTRATE 2 ML: 15; .005 INJECTION, SOLUTION EPIDURAL; INFILTRATION; INTRACAUDAL; PERINEURAL at 14:41

## 2019-12-04 RX ADMIN — LIDOCAINE HYDROCHLORIDE,EPINEPHRINE BITARTRATE 3 ML: 15; .005 INJECTION, SOLUTION EPIDURAL; INFILTRATION; INTRACAUDAL; PERINEURAL at 13:25

## 2019-12-04 RX ADMIN — IBUPROFEN 800 MG: 800 TABLET ORAL at 23:03

## 2019-12-04 RX ADMIN — Medication: at 23:03

## 2019-12-04 RX ADMIN — FENTANYL CITRATE 100 MCG: 50 INJECTION, SOLUTION INTRAMUSCULAR; INTRAVENOUS at 15:30

## 2019-12-04 RX ADMIN — LIDOCAINE HYDROCHLORIDE,EPINEPHRINE BITARTRATE 2 ML: 15; .005 INJECTION, SOLUTION EPIDURAL; INFILTRATION; INTRACAUDAL; PERINEURAL at 13:31

## 2019-12-04 RX ADMIN — LIDOCAINE HYDROCHLORIDE 7 ML: 20 INJECTION, SOLUTION INFILTRATION; PERINEURAL at 14:57

## 2019-12-04 RX ADMIN — BUPIVACAINE HYDROCHLORIDE 10 ML: 2.5 INJECTION, SOLUTION INFILTRATION; PERINEURAL at 14:41

## 2019-12-04 RX ADMIN — BUPIVACAINE HYDROCHLORIDE 10 ML: 2.5 INJECTION, SOLUTION INFILTRATION; PERINEURAL at 13:57

## 2019-12-04 RX ADMIN — Medication: at 13:39

## 2019-12-04 RX ADMIN — ONDANSETRON 4 MG: 2 INJECTION INTRAMUSCULAR; INTRAVENOUS at 15:04

## 2019-12-04 RX ADMIN — Medication 10 ML/HR: at 13:39

## 2019-12-04 RX ADMIN — SODIUM CHLORIDE, POTASSIUM CHLORIDE, SODIUM LACTATE AND CALCIUM CHLORIDE: 600; 310; 30; 20 INJECTION, SOLUTION INTRAVENOUS at 08:12

## 2019-12-04 RX ADMIN — SODIUM CHLORIDE, POTASSIUM CHLORIDE, SODIUM LACTATE AND CALCIUM CHLORIDE: 600; 310; 30; 20 INJECTION, SOLUTION INTRAVENOUS at 11:53

## 2019-12-04 RX ADMIN — BUPIVACAINE HYDROCHLORIDE 7 ML: 2.5 INJECTION, SOLUTION INFILTRATION; PERINEURAL at 15:13

## 2019-12-04 RX ADMIN — Medication 2 MILLI-UNITS/MIN: at 08:22

## 2019-12-04 RX ADMIN — IBUPROFEN 800 MG: 800 TABLET ORAL at 17:33

## 2019-12-04 RX ADMIN — BUPIVACAINE HYDROCHLORIDE 10 ML: 2.5 INJECTION, SOLUTION INFILTRATION; PERINEURAL at 13:31

## 2019-12-04 RX ADMIN — SENNOSIDES AND DOCUSATE SODIUM 1 TABLET: 8.6; 5 TABLET ORAL at 23:03

## 2019-12-04 NOTE — ANESTHESIA PROCEDURE NOTES
Peripheral nerve/Neuraxial procedure note : epidural catheter  Pre-Procedure    Location: OB    Procedure Times:12/4/2019 2:22 PM and 12/4/2019 2:42 PM  Pre-Anesthestic Checklist: patient identified, IV checked, risks and benefits discussed, informed consent, monitors and equipment checked, pre-op evaluation and at physician/surgeon's request    Timeout  Correct Patient: Yes   Correct Procedure: Yes   Correct Site: Yes   Correct Laterality: N/A   Correct Position: Yes   Site Marked: N/A   .   Procedure Documentation    .    Procedure: epidural catheter, .   Patient Position:sitting Insertion Site:L3-4  (midline approach) Injection technique: LORT air   Local skin infiltrated with 3 mL of 1% lidocaine.      Patient Prep/Sterile Barriers; mask, sterile gloves, povidone-iodine 7.5% surgical scrub, patient draped.  .  Needle: Touhy needle, Membreno   Needle Gauge: 18.    Needle Length (Inches) 3.5   # of attempts: 1 and  # of redirects:  1 .    Catheter: 20 G . .  Catheter threaded easily  5 cm epidural space.  .   .    Assessment/Narrative  Paresthesias: No.  .  .  Aspiration negative for heme or CSF  . Test dose of 3 mL lidocaine 1.5% w/ 1:200,000 epinephrine at 14:32.  Test dose negative for signs of intravascular, subdural or intrathecal injection. Comments:  Pain was not improved with the first epidural.  This was removed intact after agreeing with the pt that this catheter should be replaced.  Good Tom at L2-3 and confirmed CSF with the 27 gauge CSE needly through the Touhy.  CSF through the 27 ga, but none in the Touhy.  Catheter advanced easily.  No complications.  Will follow for pain improvement.

## 2019-12-04 NOTE — PLAN OF CARE
"Having trouble picking up ctx even  with freq adjustment of toco. Pt states q2-3\", are getting somewhat stronger  "

## 2019-12-04 NOTE — PLAN OF CARE
"Epidural placed and test dosed at 1325. Slowly becoming more comfortable. Tolerated procedure well. Ctx q2\" with pitocin at 10m/unit(s). FHT cat 1. Small amt amniotic fluid  "

## 2019-12-04 NOTE — PROGRESS NOTES
[facility] Labor and Delivery Progress Note    Wyatt Bruno MRN# [id number]   Age: 29 year old YOB: 1990           Subjective:   Feeling contractions every 2-3 mintues but not too painful. Laying on bed comfortably with  at bedside.            Objective:     Patient Vitals for the past 24 hrs:   BP Temp Temp src Oximeter Heart Rate   19 1009 133/71 -- -- 82 bpm   19 0742 135/74 98  F (36.7  C) Oral 84 bpm         Cervical Exam:  50-60% / -3      Position: Anterior    Membranes: Ruptured      Fetal Heart Rate:    Monitor: external US    Variability: moderate (amplitude range 6 to 25 bpm)    Baseline Rate: normal range    Fetal Heart Rate Tracin          Assessment:   Wyatt Bruno is a 29 year old  who is 39w4d here with induction of labor with pitocin progressing normally. Spontaneous rupture of membranes.           Plan:   Continue pitocin. Plan for epidural when anesthesia available. Anticipate .    This document serves as a record of the services and decisions personally performed and made by Joel Martinez MD.  It was created on his behalf by Feli Campos, a trained medical student and scribe.  The creation of this record is based on the provider's personal observations and the statements of the patient.     Feli Campos, MS3  2019       I agree with the PFSH and ROS as completed by the MS, .  The remainder of the encounter was performed by me and scribed by the MS.  The scribed note accurately reflects my personal services and the decisions made by me.           Joel Martinez MD

## 2019-12-04 NOTE — PLAN OF CARE
S: Delivery  B: induced Labor,  @ 54wmg2iiqc gestation, GBS negative   A: Patient delivered Vaginal at 1553 with Dr. Martinez in attendance. Baby delivered and placed on mother's low abdomen for delayed cord clamping where baby was dried and stimulated. After cord clamped and cut, baby was placed skin to skin on mother's chest within 5 minutes following delivery . Apgars 9/9. Placenta was delivered @ 1610 followed by administration of oxytocin. Bonding initiated with mom and baby. Educated mother on importance of exclusive breast feeding, expected feeding readiness cues and encouraged her to observe for feeding cues. Mother informed that breast feeding assistance would be provided. See flowsheet for VS and PP checks. Labor care plan goals met.  R: Expect routine postpartum care. Anticipate first feeding within the hour.

## 2019-12-04 NOTE — PROGRESS NOTES
Wyatt Bruno is a 29-year-old -0-0-2 now P3-0-0-3 who is 39 4/7wks gestation who had prenatal care with me at the [facility].  The patient's pregnancy was uncomplicated, her blood type is O +, Hep B immune, Rubella immune, TreponemaAb negative, HIVnegative.     The patient was brought in for Pitocin induction today.     FIRST STAGE: She came in she was having infrequent contraciton. Pitocin was started at 0837. ROM occurred at 1115. She had copious amounts of meconium stained fluid. She was 3cm/50% effaced/-3 station. Her Pitocin was at a max of 10 milliunits per minute. She was completely dilated and feeling pushy at 1545. She received fentanyl followed by an epidural for pain management This provided some relief.   Total time of first stage of labor was 7 hours and 8 minutes.     SECOND STAGE: She started pushing at 1553.  she delivered a viable male  over an intact perineum in the RJ presentation at 1553.  Second stage was uncomplicated     There was spontaneous crying and respirations noted. Baby was suctioned through the mouth and nares at completion of the delivery after it was placed on [facility] chest.  There was a nuchal cord that was reduced x1. The cord was clamped x2 and cut by father of the baby.   Total time of second stage of labor was 1 minutes.     THIRD STAGE: Placenta delivered with active management in the Schultze presentation. There was a 3-vessel cord noted and the placenta was intact and complete on inspection.   Total time of third stage of labor was 17 minutes.     On inspection of the perineum, there were no lacerations noted.  Fundus was firm. She received Pitocin 10 units IM. Both mom and this male  who weighed 2990 grams or 6 pounds 9.5 ounces were stable when I left the delivery room. Apgars were 9 and 9 at 1 and 5 minutes respectively.     This document serves as a record of the services and decisions personally performed and made by        SILVIANO Martinez   MD.  It was created on his behalf by Feli Campos, a trained medical student and scribe.  The creation of this record is based on the provider's personal observations and the statements of the patient.     Feli Campos, MS3  December 4, 2019       I agree with the PFSH and ROS as completed by the MS, .  The remainder of the encounter was performed by me and scribed by the MS.  The scribed note accurately reflects my personal services and the decisions made by me.   Joel Martinez MD    12/4/2019
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
## 2019-12-04 NOTE — PROGRESS NOTES
"Continues to have ctx pain at \"8-9\". Breathing well with coaching. Nitrous ineffective for relief, given fentanyl 100mcg IV at 1530, pt states good relief. Having increased pressure, will straight cath and check cervix    "

## 2019-12-04 NOTE — PLAN OF CARE
Continuing to have pain after 2nd epidural palced. Pain is lower abd, dermatomes T10/T8. Breathing well with ctx, good focus
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
## 2019-12-04 NOTE — PLAN OF CARE
S: Induction of labor  B: Patient is a  who presents for scheduled induction of labor @ 39w4d gestation. EFW 8#  A: IV Oxytocin per OB protocol beginning at 2 mU/min. Patient has infrequent ctx Lasting 40 sec. Pt rates her pain at a 0/10. Understands that she will be on continuous EFM throughout induction. FHR baseline is 130 with mod variability. Patient agrees with plan of care.   R: Will observe for active phase of labor and fetal tolerance.

## 2019-12-04 NOTE — PLAN OF CARE
"Dr. Martinez here and cervix checked, SROM during that, mod amt mec fluid. Dilated 4/80/-3. Pitocin at 8m/unit(s), ctx q2\" but mild, only 40sec long. FHT cat1    "
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
## 2019-12-05 VITALS
RESPIRATION RATE: 16 BRPM | SYSTOLIC BLOOD PRESSURE: 130 MMHG | DIASTOLIC BLOOD PRESSURE: 60 MMHG | HEART RATE: 84 BPM | TEMPERATURE: 97.9 F | OXYGEN SATURATION: 93 %

## 2019-12-05 LAB — HGB BLD-MCNC: 9.8 G/DL (ref 11.7–15.7)

## 2019-12-05 PROCEDURE — 85018 HEMOGLOBIN: CPT | Performed by: FAMILY MEDICINE

## 2019-12-05 PROCEDURE — 25000132 ZZH RX MED GY IP 250 OP 250 PS 637: Performed by: FAMILY MEDICINE

## 2019-12-05 PROCEDURE — 36415 COLL VENOUS BLD VENIPUNCTURE: CPT | Performed by: FAMILY MEDICINE

## 2019-12-05 RX ADMIN — IBUPROFEN 800 MG: 800 TABLET ORAL at 11:49

## 2019-12-05 RX ADMIN — SENNOSIDES AND DOCUSATE SODIUM 1 TABLET: 8.6; 5 TABLET ORAL at 08:40

## 2019-12-05 RX ADMIN — IBUPROFEN 800 MG: 800 TABLET ORAL at 05:31

## 2019-12-05 NOTE — PROGRESS NOTES
[facility] Obstetrics Post-Partum Progress Note          Assessment and Plan:    Assessment:   Post-partum day #1  Induced vaginal delivery secondary to maternal request  L&D complications: None      Doing well.  No excessive bleeding  Pain well-controlled.      Plan:   Ambulation encouraged  Breast feeding strategies discussed  Pain control measures as needed  Reportable signs and symptoms dicussed with the patient  Anticipate discharge tomorrow           Interval History:   Doing well.  Pain is adequately controlled.  No fevers.  No history of foul-smelling vaginal discharge.  Good appetite.  Denies chest pain, shortness of breath, nausea or vomiting.  Vaginal bleeding is similar to a heavy menstrual flow.  Breastfeeding well. Ambulating to bathroom, voiding well, passing gas but no bowel movements.          Significant Problems:    None          Review of Systems:    CONSTITUTIONAL: NEGATIVE for fever, chills, change in weight  EYES: NEGATIVE for vision changes or irritation  RESP: NEGATIVE for significant cough or SOB  CV: NEGATIVE for chest pain, palpitations or peripheral edema          Medications:     All medications related to the patient's surgery have been reviewed  Current Facility-Administered Medications   Medication     acetaminophen (TYLENOL) tablet 650 mg     [START ON 12/6/2019] bisacodyl (DULCOLAX) Suppository 10 mg     hydrocortisone 2.5 % cream     ibuprofen (ADVIL/MOTRIN) tablet 800 mg     lactated ringers BOLUS 1,000 mL     lanolin ointment     misoprostol (CYTOTEC) suppository 800 mcg     naloxone (NARCAN) injection 0.1-0.4 mg     No MMR Needed - Assessment: Patient does not need MMR vaccine     NO Rho (D) immune globulin (RhoGam) needed - mother Rh POSITIVE     No Tdap Needed - Assessment: Patient does not need Tdap vaccine     oxytocin (PITOCIN) 30 units in 500 mL 0.9% NaCl infusion     oxytocin (PITOCIN) 30 units in 500 mL 0.9% NaCl infusion     oxytocin (PITOCIN) injection 10 Units      senna-docusate (SENOKOT-S/PERICOLACE) 8.6-50 MG per tablet 1 tablet    Or     senna-docusate (SENOKOT-S/PERICOLACE) 8.6-50 MG per tablet 2 tablet     [START ON 12/6/2019] sodium phosphate (FLEET ENEMA) 1 enema     tranexamic acid (CYKLOKAPRON) Bolus 1g vial attach to NaCl 50 or 100 mL bag ADULT             Physical Exam:   All vitals stable  Temp: 97.5  F (36.4  C) Temp src: Oral BP: 136/64 Pulse: 85 Heart Rate: 71 Resp: 20 SpO2: 93 %      Uterine fundus is firm, non-tender and below the level of the umbilicus          Data:     Lab Results   Component Value Date    WBC 4.1 04/12/2019    HGB 9.8 (L) 12/05/2019    HCT 38.9 04/12/2019     04/12/2019     (H) 12/28/2016    POTASSIUM 3.8 12/28/2016    CHLORIDE 108 12/28/2016    CO2 26 12/28/2016    BUN 12 12/28/2016    CR 0.66 12/28/2016    GLC 89 12/28/2016    SED 9 07/16/2018    AST 15 12/28/2016    ALT 29 12/28/2016    ALKPHOS 102 12/28/2016    BILITOTAL 0.3 12/28/2016     No imaging studies have been ordered    This document serves as a record of the services and decisions personally performed and made by Joel Martinez MD.  It was created on his behalf by Feli Campos, a trained medical student and scribe.  The creation of this record is based on the provider's personal observations and the statements of the patient.         Feli Campos        I agree with the PFSH and ROS as completed by the MS, .  The remainder of the encounter was performed by me and scribed by the MS.  The scribed note accurately reflects my personal services and the decisions made by me.   Joel Martinez MD

## 2019-12-05 NOTE — PROGRESS NOTES
S: Transfer to postpartum  B: Vaginal birth @ 1553, no tear, breast feeding    A: Mother and baby transferred to postpartum unit at 1830 via w/c after completion of immediate recovery period. Patient oriented to room. Mother and baby bonding well and in satisfactory condition upon transfer.  R: Anticipate routine postpartum care.

## 2019-12-05 NOTE — DISCHARGE INSTRUCTIONS
Please drink plenty of liquids 7- 8 glasses a day, rest when needed, take your pain medications and ibuprofen if ordered, please call me if you have any questions or concerns  at the clinic at [phone].  Please enjoy your time with your new baby, Dr. Martinez      [facility] Discharge Instructions     Discharge disposition:  Discharged to home       Diet:  Regular       Activity Lifting restricted to 10 pounds  No lifting, driving, or strenuous exercise for 2 week(s)       Follow-up: Follow up with primary care provider in 6 weeks       Additional instructions: None      Additional Patient Information:  You do not need any additional instructions today.

## 2019-12-05 NOTE — PLAN OF CARE
Up with SBA to bathroom,unable to void. Into tub without assist. Light flow. Report to JLUIS Cleary

## 2019-12-05 NOTE — PLAN OF CARE
S: Shift review   B:Wyatt is a  who delivered vaginally on   A: VSS, patient is independent with mobility, pain well controlled with p.o. pain meds. Handles baby with confidence.  R: Continue with routine PP care

## 2019-12-05 NOTE — DISCHARGE SUMMARY
[facility] Discharge Summary    Wyatt Bruno MRN# [id number]   Age: 29 year old YOB: 1990     Date of Admission:  2019  Date of Discharge::  2019  Admitting Physician:  Joel Martinez MD  Discharge Physician:  Joel Martinez MD, MD     Home clinic: [facility]          Admission Diagnoses:   Induced Vaginal delivery           Discharge Diagnosis:   Same   Intrauterine pregnancy at 39  weeks gestation  Acute Blood loss anemia patient does not want FE supplementation will take her prenatals           Procedures:   Procedure(s): No additional procedures performed       No other procedures performed during this admission           Medications Prior to Admission:     Medications Prior to Admission   Medication Sig Dispense Refill Last Dose     Prenatal Vit-Fe Fumarate-FA (PRENATAL FORMULA PO)    12/3/2019 at 2000     ibuprofen (ADVIL/MOTRIN) 800 MG tablet Take 1 tablet (800 mg) by mouth every 6 hours as needed for other (cramping) (Patient not taking: Reported on 3/12/2018) 100 tablet 0 Not Taking             Discharge Medications:     Current Discharge Medication List      CONTINUE these medications which have NOT CHANGED    Details   Prenatal Vit-Fe Fumarate-FA (PRENATAL FORMULA PO)       ibuprofen (ADVIL/MOTRIN) 800 MG tablet Take 1 tablet (800 mg) by mouth every 6 hours as needed for other (cramping)  Qty: 100 tablet, Refills: 0    Associated Diagnoses: Normal labor                   Consultations:   No consultations were requested during this admission          Brief History of Labor:         Shared             []Hide copied text    []Edu for details  Wyatt Bruno is a 29-year-old -0-0-2 now P3-0-0-3 who is 39 4/7wks gestation who had prenatal care with me at the [facility].  The patient's pregnancy was uncomplicated, her blood type is O +, Hep B immune, Rubella immune, TreponemaAb negative, HIVnegative.     The patient was brought in for  Pitocin induction today.     FIRST STAGE: She came in she was having infrequent contraciton. Pitocin was started at 0837. ROM occurred at 1115. She had copious amounts of meconium stained fluid. She was 3cm/50% effaced/-3 station. Her Pitocin was at a max of 10 milliunits per minute. She was completely dilated and feeling pushy at 1545. She received fentanyl followed by an epidural for pain management This provided some relief.   Total time of first stage of labor was 7 hours and 8 minutes.     SECOND STAGE: She started pushing at 1553.  she delivered a viable male  over an intact perineum in the RJ presentation at 1553.  Second stage was uncomplicated     There was spontaneous crying and respirations noted. Baby was suctioned through the mouth and nares at completion of the delivery after it was placed on moms chest.  There was a nuchal cord that was reduced x1. The cord was clamped x2 and cut by father of the baby.   Total time of second stage of labor was 1 minutes.     THIRD STAGE: Placenta delivered with active management in the Schultze presentation. There was a 3-vessel cord noted and the placenta was intact and complete on inspection.   Total time of third stage of labor was 17 minutes.     On inspection of the perineum, there were no lacerations noted.  Fundus was firm. She received Pitocin 10 units IM. Both mom and this male  who weighed 2990 grams or 6 pounds 9.5 ounces were stable when I left the delivery room. Apgars were 9 and 9 at 1 and 5 minutes respectively.      This document serves as a record of the services and decisions personally performed and made by MD Juan.  It was created on his behalf by Feli Campos, a trained medical student and [facility] Course:   The patient's hospital course was unremarkable.  On discharge, her pain was well controlled. Vaginal bleeding is similar to peak menstrual flow.  Voiding without difficulty.  Ambulating well and  tolerating a normal diet.  No fever.  Breastfeeding well.  Infant is stable.  No bowel movement yet.*  She was discharged on post-partum day #2.    Post-partum hemoglobin:   Hemoglobin   Date Value Ref Range Status   12/05/2019 9.8 (L) 11.7 - 15.7 g/dL Final             Discharge Instructions and Follow-Up:   Discharge diet: Regular   Discharge activity: Lifting restricted to 10 pounds  No lifting, driving, or strenuous exercise for 2 week(s)   Discharge follow-up: Follow up with primary care provider in 6 weeks   Wound care: Drink plenty of fluids           Discharge Disposition:   Discharged to home      Attestation:  I have reviewed today's vital signs, notes, medications, labs and imaging.    Joel Martinez MD, MD

## 2019-12-05 NOTE — ANESTHESIA CARE TRANSFER NOTE
Patient: Wyatt Bruno    * No procedures listed *    Diagnosis: * No pre-op diagnosis entered *  Diagnosis Additional Information: No value filed.    Anesthesia Type:   No value filed.     Note:  Airway :Room Air  Patient transferred to:Labor and Delivery  Handoff Report: Identifed the Patient, Identified the Reponsible Provider, Reviewed the pertinent medical history, Discussed the surgical course, Reviewed Intra-OP anesthesia mangement and issues during anesthesia, Set expectations for post-procedure period and Allowed opportunity for questions and acknowledgement of understanding      Vitals: (Last set prior to Anesthesia Care Transfer)              Electronically Signed By: JEFF Ceron CRNA  December 5, 2019  12:18 PM

## 2019-12-05 NOTE — PROGRESS NOTES
S: Discharge  to home.    B: Patient had a Vaginal delivery with no complications. Baby boy Baby's name Nick, breast: . Support person's name Benito. Needs to sign Birth Certificate at business office.    A: Breastfeeding well. Pain controlled with ibuprofen. INdependent with  cares.    R:  Discharge instructions reviewed and questions answered.  Belongings gathered and returned to the patient. Agreed to follow up in 6 weeks or sooner with any question or concerns.     Nursing Discharge Checklist:    Pneumovax screened and given, if appropriate: N/A  Influenza vaccine screened and given, if appropriate: N/A  Staples removed (): N/A  Breast milk returned: N/A  Hydrogel pads sent home:YES  Birth Certificate Done: NO, patient turned it in. Will come to business office to verify when she returns for well child check.

## 2019-12-05 NOTE — ANESTHESIA POSTPROCEDURE EVALUATION
Patient: Wyatt Bruno    * No procedures listed *    Diagnosis:* No pre-op diagnosis entered *  Diagnosis Additional Information: No value filed.    Anesthesia Type:  No value filed.    Note:  Anesthesia Post Evaluation    Patient location during evaluation: Bedside  Patient participation: Able to fully participate in evaluation  Level of consciousness: awake  Pain management: adequate  Airway patency: patent  Cardiovascular status: acceptable and hemodynamically stable  Respiratory status: acceptable, room air and nonlabored ventilation  Hydration status: stable  PONV: none     Anesthetic complications: None    Comments: Patient was happy with the anesthesia care received and no anesthesia related complications were noted.  I will follow up with the patient again if it is needed.        Last vitals:  Vitals:    12/04/19 2300 12/05/19 0531 12/05/19 0900   BP: 136/64  130/60   Pulse: 85  84   Resp: 20  16   Temp: 99  F (37.2  C) 97.5  F (36.4  C) 97.9  F (36.6  C)   SpO2:            Electronically Signed By: JEFF Ceron CRNA  December 5, 2019  12:18 PM
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
## 2019-12-06 NOTE — PROGRESS NOTES
S: Discharge  to home.     B: Patient had a Vaginal delivery with no complications. Baby boy Baby's name Mick, breast: . Support person's name Benito. Needs to sign Birth Certificate at business office.     A: Breastfeeding well. Pain controlled with ibuprofen. INdependent with  cares.     R:  Discharge instructions reviewed and questions answered.  Belongings gathered and returned to the patient. Agreed to follow up in 6 weeks or sooner with any question or concerns.      Nursing Discharge Checklist:     Pneumovax screened and given, if appropriate: N/A  Influenza vaccine screened and given, if appropriate: N/A  Staples removed (): N/A  Breast milk returned: N/A  Hydrogel pads sent home:YES  Birth Certificate Done: NO, patient turned it in. Will come to business office to verify when she returns for well child check.

## 2019-12-08 NOTE — PROGRESS NOTES
"Wyatt Bruno  Gender: female  : 1990  [address] [place] [address] [phone] (home)   Medical Record: [id number]  Primary Care Provider: Joel Martinez       [facility]   ?   Discharge Phone Call: Key Words/Key Times     How are you and the baby? Pt states that both her and her  are doing well.  Pt's bleeding was a little heavier yesterday after carrying around her older child but has been fine since.  Denies going through more then 1 pad in 1-2 hours.  Denies any clots.      How are feedings going? Patient has switched  to formula.  She states that she doesn't have a good milk supply and that this happened with her other two children as well.  She is occasionally pumping so that  can get some breastmilk.  [place] is taking 1-2 ounces every few hours. Voiding and stooling.   Any questions or concerns? Denies any questions or concerns    Follow-up appointment? Has a follow up appointment in the clinic tomorrow. Pt was reminded to stop in the financial aid office to complete the birth certificate   We want to provide excellent care here at [facility]. Do you have any feedback for us that would help us improve? \"everything was good\"  "

## 2020-01-06 ENCOUNTER — MEDICAL CORRESPONDENCE (OUTPATIENT)
Dept: HEALTH INFORMATION MANAGEMENT | Facility: CLINIC | Age: 30
End: 2020-01-06

## 2020-02-13 ENCOUNTER — PRENATAL OFFICE VISIT (OUTPATIENT)
Dept: FAMILY MEDICINE | Facility: CLINIC | Age: 30
End: 2020-02-13
Payer: COMMERCIAL

## 2020-02-13 VITALS
SYSTOLIC BLOOD PRESSURE: 108 MMHG | TEMPERATURE: 98.5 F | BODY MASS INDEX: 37.15 KG/M2 | OXYGEN SATURATION: 98 % | RESPIRATION RATE: 13 BRPM | HEART RATE: 92 BPM | DIASTOLIC BLOOD PRESSURE: 84 MMHG | HEIGHT: 65 IN | WEIGHT: 223 LBS

## 2020-02-13 DIAGNOSIS — Z23 NEED FOR VACCINATION: ICD-10-CM

## 2020-02-13 PROCEDURE — 90713 POLIOVIRUS IPV SC/IM: CPT | Performed by: FAMILY MEDICINE

## 2020-02-13 PROCEDURE — 90471 IMMUNIZATION ADMIN: CPT | Performed by: FAMILY MEDICINE

## 2020-02-13 PROCEDURE — 99207 ZZC POST PARTUM EXAM: CPT | Performed by: FAMILY MEDICINE

## 2020-02-13 ASSESSMENT — MIFFLIN-ST. JEOR: SCORE: 1727.16

## 2020-02-13 ASSESSMENT — PAIN SCALES - GENERAL: PAINLEVEL: NO PAIN (0)

## 2020-02-13 NOTE — PROGRESS NOTES
"  Wyatt is here for a 6-week postpartum checkup.    She had a induced vaginal delivery of a viable boy, weight 6 pounds 9 oz., with none complications. Date of delivery was 19. Since delivery, she has not been breast feeding.  She has no signs of infection, bleeding or other complications.  She is not pregnant.  We discussed contraceptions and she has chosen vasectomy.      Post partum tubal: No  History of Gestational Diabetes? No  Type of Delivery:  Vaginal  Feeding Method:  Breast and formula  If initiated breast feeding and stopped, how long did you breast feed?:  1 week    REVIEW OF SYSTEMS:  Ears/Nose/Throat: negative  Respiratory: negative  Cardiovascular: negative  Gastrointestinal: negative  Genitourinary: negative  Musculoskeletal: negative    Neurologic: negative   Skin: negative   Endocrine:  negative  Vagina: negative  Cervix: negative  Breasts: negative  Vulva: negative  Episiotomy: negative    Contraception Plan: vasectomy    Medical History Reviewed yes   Family History Reviewed no  Problem List Updated yes     EXAM:  /84   Pulse 92   Temp 98.5  F (36.9  C) (Tympanic)   Resp 13   Ht 1.659 m (5' 5.3\")   Wt 101.2 kg (223 lb)   LMP 2019 (Exact Date)   SpO2 98%   Breastfeeding No   BMI 36.77 kg/m    HEENT: grossly normal.  NECK: no lymphadenopathy or thyroidomegaly.  LUNGS: CTA X 2, no rales or crackles.  BACK: No spinal or CVA tenderness.  HEART: RRR without murmurs clicks or gallops.  ABDOMEN: soft, non tender, good bowel sounds, without masses rebound, guarding or tenderness.  EXTREMITIES:  warm to touch, good pulses, no ankle edema or calf tenderness.  NEUROLOGIC: grossly normal.    ASSESSMENT:   6-week postpartum exam after .    PLAN:      One-hour glucose tolerance test needed? No  vasectomy for contraception.    This document serves as a record of the services and decisions personally performed and made by Joel Martinez MD.  It was created on his behalf by Amber" Jailyn, a trained medical student and scribe.  The creation of this record is based on the provider's personal observations and the statements of the patient.          I agree with the PFSH and ROS as completed by the MS, .  The remainder of the encounter was performed by me and scribed by the MS.  The scribed note accurately reflects my personal services and the decisions made by me.     Joel Martinez MD

## 2020-02-13 NOTE — PROGRESS NOTES
Prior to immunization administration, verified patients identity using patient s name and date of birth. Please see Immunization Activity for additional information.     Screening Questionnaire for Adult Immunization    Are you sick today?   No   Do you have allergies to medications, food, a vaccine component or latex?   No   Have you ever had a serious reaction after receiving a vaccination?   No   Do you have a long-term health problem with heart, lung, kidney, or metabolic disease (e.g., diabetes), asthma, a blood disorder, no spleen, complement component deficiency, a cochlear implant, or a spinal fluid leak?  Are you on long-term aspirin therapy?   No   Do you have cancer, leukemia, HIV/AIDS, or any other immune system problem?   No   Do you have a parent, brother, or sister with an immune system problem?   No   In the past 3 months, have you taken medications that affect  your immune system, such as prednisone, other steroids, or anticancer drugs; drugs for the treatment of rheumatoid arthritis, Crohn s disease, or psoriasis; or have you had radiation treatments?   No   Have you had a seizure, or a brain or other nervous system problem?   No   During the past year, have you received a transfusion of blood or blood    products, or been given immune (gamma) globulin or antiviral drug?   No   For women: Are you pregnant or is there a chance you could become       pregnant during the next month?   No   Have you received any vaccinations in the past 4 weeks?   No     Immunization questionnaire answers were all negative.        Per orders of Dr. Martinez, injection of Polio given by Kayla Warner. Patient instructed to remain in clinic for 15 minutes afterwards, and to report any adverse reaction to me immediately.  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

## 2020-03-11 ENCOUNTER — ALLIED HEALTH/NURSE VISIT (OUTPATIENT)
Dept: FAMILY MEDICINE | Facility: OTHER | Age: 30
End: 2020-03-11
Payer: COMMERCIAL

## 2020-03-11 DIAGNOSIS — Z11.1 VISIT FOR MANTOUX TEST: Primary | ICD-10-CM

## 2020-03-11 PROCEDURE — 86580 TB INTRADERMAL TEST: CPT

## 2020-03-11 PROCEDURE — 99207 ZZC NO CHARGE NURSE ONLY: CPT

## 2020-03-24 ENCOUNTER — ALLIED HEALTH/NURSE VISIT (OUTPATIENT)
Dept: FAMILY MEDICINE | Facility: OTHER | Age: 30
End: 2020-03-24
Payer: COMMERCIAL

## 2020-03-24 DIAGNOSIS — Z11.1 SCREENING EXAMINATION FOR PULMONARY TUBERCULOSIS: Primary | ICD-10-CM

## 2020-03-24 PROCEDURE — 99207 ZZC NO CHARGE NURSE ONLY: CPT

## 2020-03-24 PROCEDURE — 86580 TB INTRADERMAL TEST: CPT

## 2020-03-24 NOTE — PROGRESS NOTES
The patient is asked the following questions today and these are her answers:    -Have you had a mantoux administered in the past 30 days?    Yes  -Have you had a previous positive Mantoux.  No  -Have you received BCG in the past.  No  -Have you had a live vaccine  (MMR, Varicella, OPV, Yellow Fever) in the last 6 weeks.  No  -Have you had and active  viral or bacterial infection in the past 6 weeks.  No  -Have you received corticosteroids or immunosuppressive agents in the past 6 weeks.  No  -Have you been diagnosed with HIV?  No  -Do you have a malignancy?  No    Mantoux Questionnaire: was positive for at least one answer.  Notified Bear Andrews. Patient needs two step had last one placed on 3/11/2020.    PPD was given to patient in RFA @ 4:09pm. Patient advised to return within 48-72 hours to have PPD read.     Faby Dee MA

## 2020-04-07 ENCOUNTER — ALLIED HEALTH/NURSE VISIT (OUTPATIENT)
Dept: FAMILY MEDICINE | Facility: CLINIC | Age: 30
End: 2020-04-07
Payer: COMMERCIAL

## 2020-04-07 DIAGNOSIS — Z23 NEED FOR VACCINATION: Primary | ICD-10-CM

## 2020-04-07 PROCEDURE — 90713 POLIOVIRUS IPV SC/IM: CPT

## 2020-04-07 PROCEDURE — 99207 ZZC NO CHARGE NURSE ONLY: CPT

## 2020-04-07 PROCEDURE — 90471 IMMUNIZATION ADMIN: CPT

## 2020-04-07 NOTE — NURSING NOTE
Prior to immunization administration, verified patients identity using patient s name and date of birth. Please see Immunization Activity for additional information.     Screening Questionnaire for Adult Immunization    Are you sick today?   No   Do you have allergies to medications, food, a vaccine component or latex?   No   Have you ever had a serious reaction after receiving a vaccination?   No   Do you have a long-term health problem with heart, lung, kidney, or metabolic disease (e.g., diabetes), asthma, a blood disorder, no spleen, complement component deficiency, a cochlear implant, or a spinal fluid leak?  Are you on long-term aspirin therapy?   No   Do you have cancer, leukemia, HIV/AIDS, or any other immune system problem?   No   Do you have a parent, brother, or sister with an immune system problem?   No   In the past 3 months, have you taken medications that affect  your immune system, such as prednisone, other steroids, or anticancer drugs; drugs for the treatment of rheumatoid arthritis, Crohn s disease, or psoriasis; or have you had radiation treatments?   No   Have you had a seizure, or a brain or other nervous system problem?   No   During the past year, have you received a transfusion of blood or blood    products, or been given immune (gamma) globulin or antiviral drug?   No   For women: Are you pregnant or is there a chance you could become       pregnant during the next month?   No   Have you received any vaccinations in the past 4 weeks?   No     Immunization questionnaire answers were all negative.        Per orders of Dr. Martinez, injection of Polio given by Enoc Mayen CMA. Patient instructed to remain in clinic for 15 minutes afterwards, and to report any adverse reaction to me immediately.    Patient will be due for her 3rd dose of polio 6-12 months after her 2nd dose per CDC website.     Screening performed by Enoc Mayen CMA on 4/7/2020 at 12:16 PM.

## 2020-08-06 ENCOUNTER — MYC MEDICAL ADVICE (OUTPATIENT)
Dept: FAMILY MEDICINE | Facility: CLINIC | Age: 30
End: 2020-08-06

## 2020-08-06 DIAGNOSIS — Z23 ENCOUNTER FOR IMMUNIZATION: Primary | ICD-10-CM

## 2020-08-10 NOTE — TELEPHONE ENCOUNTER
Per RF- yes this is fine. Titer is ordered. Will mychart her back.  Darlene Nathan, [facility]

## 2020-08-11 DIAGNOSIS — Z23 ENCOUNTER FOR IMMUNIZATION: ICD-10-CM

## 2020-08-11 PROCEDURE — 86658 ENTEROVIRUS ANTIBODY: CPT | Mod: 90 | Performed by: FAMILY MEDICINE

## 2020-08-11 PROCEDURE — 36415 COLL VENOUS BLD VENIPUNCTURE: CPT | Performed by: FAMILY MEDICINE

## 2020-08-11 PROCEDURE — 99000 SPECIMEN HANDLING OFFICE-LAB: CPT | Performed by: FAMILY MEDICINE

## 2020-08-19 LAB
PV1 NAB TITR SER NT: NORMAL {TITER}
PV3 NAB TITR SER NT: NORMAL {TITER}

## 2020-11-17 DIAGNOSIS — Z11.1 SCREENING EXAMINATION FOR PULMONARY TUBERCULOSIS: ICD-10-CM

## 2020-11-17 PROCEDURE — 86481 TB AG RESPONSE T-CELL SUSP: CPT | Performed by: FAMILY MEDICINE

## 2020-11-17 PROCEDURE — 36415 COLL VENOUS BLD VENIPUNCTURE: CPT | Performed by: FAMILY MEDICINE

## 2020-11-20 LAB
GAMMA INTERFERON BACKGROUND BLD IA-ACNC: 0.14 IU/ML
M TB IFN-G CD4+ BCKGRND COR BLD-ACNC: 9.86 IU/ML
M TB TUBERC IFN-G BLD QL: NEGATIVE
MITOGEN IGNF BCKGRD COR BLD-ACNC: 0 IU/ML
MITOGEN IGNF BCKGRD COR BLD-ACNC: 0 IU/ML

## 2021-01-20 NOTE — NURSING NOTE
"Chief Complaint   Patient presents with     Prenatal Care       Initial /80  Pulse 88  Temp 98.2  F (36.8  C) (Tympanic)  Resp 18  Wt 230 lb (104.3 kg)  LMP 04/28/2017 (Exact Date)  SpO2 97%  BMI 38.27 kg/m2 Estimated body mass index is 38.27 kg/(m^2) as calculated from the following:    Height as of 1/11/18: 5' 5\" (1.651 m).    Weight as of this encounter: 230 lb (104.3 kg).  BP completed using cuff size: eduar Ferrer MA      "
right upper arm

## 2021-03-13 ENCOUNTER — HEALTH MAINTENANCE LETTER (OUTPATIENT)
Age: 31
End: 2021-03-13

## 2021-07-12 ENCOUNTER — MYC MEDICAL ADVICE (OUTPATIENT)
Dept: FAMILY MEDICINE | Facility: CLINIC | Age: 31
End: 2021-07-12

## 2021-07-12 DIAGNOSIS — Z11.1 SCREENING EXAMINATION FOR PULMONARY TUBERCULOSIS: Primary | ICD-10-CM

## 2021-07-19 ENCOUNTER — LAB (OUTPATIENT)
Dept: LAB | Facility: CLINIC | Age: 31
End: 2021-07-19
Payer: COMMERCIAL

## 2021-07-19 DIAGNOSIS — Z11.1 SCREENING EXAMINATION FOR PULMONARY TUBERCULOSIS: ICD-10-CM

## 2021-07-19 PROCEDURE — 36415 COLL VENOUS BLD VENIPUNCTURE: CPT

## 2021-07-19 PROCEDURE — 86481 TB AG RESPONSE T-CELL SUSP: CPT

## 2021-07-21 LAB
QUANTIFERON MITOGEN: 10 IU/ML
QUANTIFERON NIL TUBE: 0.01 IU/ML
QUANTIFERON TB1 TUBE: 0.02 IU/ML
QUANTIFERON TB2 TUBE: 0.02

## 2021-07-22 LAB
GAMMA INTERFERON BACKGROUND BLD IA-ACNC: 0.01 IU/ML
GAMMA INTERFERON BACKGROUND BLD IA-ACNC: 0.01 IU/ML
M TB IFN-G BLD-IMP: NEGATIVE
M TB IFN-G BLD-IMP: NEGATIVE
M TB IFN-G CD4+ BCKGRND COR BLD-ACNC: 9.99 IU/ML
M TB IFN-G CD4+ BCKGRND COR BLD-ACNC: 9.99 IU/ML
MITOGEN IGNF BCKGRD COR BLD-ACNC: 0.01 IU/ML

## 2021-08-16 DIAGNOSIS — B02.9 HERPES ZOSTER WITHOUT COMPLICATION: Primary | ICD-10-CM

## 2021-08-16 RX ORDER — VALACYCLOVIR HYDROCHLORIDE 1 G/1
1000 TABLET, FILM COATED ORAL 3 TIMES DAILY
Qty: 21 TABLET | Refills: 0 | Status: SHIPPED | OUTPATIENT
Start: 2021-08-16 | End: 2023-03-17

## 2021-10-23 ENCOUNTER — HEALTH MAINTENANCE LETTER (OUTPATIENT)
Age: 31
End: 2021-10-23

## 2022-03-15 ENCOUNTER — MYC MEDICAL ADVICE (OUTPATIENT)
Dept: FAMILY MEDICINE | Facility: CLINIC | Age: 32
End: 2022-03-15
Payer: COMMERCIAL

## 2022-03-15 DIAGNOSIS — Z92.89 H/O TB SKIN TESTING: Primary | ICD-10-CM

## 2022-03-19 ENCOUNTER — LAB (OUTPATIENT)
Dept: LAB | Facility: CLINIC | Age: 32
End: 2022-03-19
Payer: COMMERCIAL

## 2022-03-19 DIAGNOSIS — Z92.89 H/O TB SKIN TESTING: ICD-10-CM

## 2022-03-19 PROCEDURE — 36415 COLL VENOUS BLD VENIPUNCTURE: CPT

## 2022-03-19 PROCEDURE — 86481 TB AG RESPONSE T-CELL SUSP: CPT

## 2022-03-21 LAB
GAMMA INTERFERON BACKGROUND BLD IA-ACNC: 0.02 IU/ML
M TB IFN-G BLD-IMP: NEGATIVE
M TB IFN-G CD4+ BCKGRND COR BLD-ACNC: 9.98 IU/ML
MITOGEN IGNF BCKGRD COR BLD-ACNC: 0 IU/ML
MITOGEN IGNF BCKGRD COR BLD-ACNC: 0.01 IU/ML
QUANTIFERON MITOGEN: 10 IU/ML
QUANTIFERON NIL TUBE: 0.02 IU/ML
QUANTIFERON TB1 TUBE: 0.03 IU/ML
QUANTIFERON TB2 TUBE: 0.02

## 2022-04-09 ENCOUNTER — HEALTH MAINTENANCE LETTER (OUTPATIENT)
Age: 32
End: 2022-04-09

## 2022-10-09 ENCOUNTER — HEALTH MAINTENANCE LETTER (OUTPATIENT)
Age: 32
End: 2022-10-09

## 2022-11-10 ENCOUNTER — MYC MEDICAL ADVICE (OUTPATIENT)
Dept: FAMILY MEDICINE | Facility: CLINIC | Age: 32
End: 2022-11-10

## 2022-11-11 ENCOUNTER — TELEPHONE (OUTPATIENT)
Dept: FAMILY MEDICINE | Facility: CLINIC | Age: 32
End: 2022-11-11

## 2022-11-11 NOTE — TELEPHONE ENCOUNTER
Patient calling to inform PCP she has shingles. Asking for anti-viral to be sent to the pharmacy.     Patient uses [facility] in [place], [place].

## 2022-11-11 NOTE — TELEPHONE ENCOUNTER
She has not been seen in the clinic since 3/2020.  Not sure how that she was diagnosed.  Recommend to be seen at the  Urgent care for it

## 2023-03-10 ASSESSMENT — ENCOUNTER SYMPTOMS
CHILLS: 0
JOINT SWELLING: 0
BREAST MASS: 0
FEVER: 0
SHORTNESS OF BREATH: 0
EYE PAIN: 0
DIARRHEA: 0
HEADACHES: 0
FREQUENCY: 0
HEARTBURN: 0
NERVOUS/ANXIOUS: 0
HEMATOCHEZIA: 0
WEAKNESS: 0
DYSURIA: 0
MYALGIAS: 0
PARESTHESIAS: 0
DIZZINESS: 0
SORE THROAT: 0
PALPITATIONS: 0
HEMATURIA: 0
CONSTIPATION: 0
ARTHRALGIAS: 0
ABDOMINAL PAIN: 0
NAUSEA: 0
COUGH: 0

## 2023-03-17 ENCOUNTER — OFFICE VISIT (OUTPATIENT)
Dept: FAMILY MEDICINE | Facility: CLINIC | Age: 33
End: 2023-03-17
Payer: COMMERCIAL

## 2023-03-17 VITALS
WEIGHT: 179 LBS | SYSTOLIC BLOOD PRESSURE: 112 MMHG | TEMPERATURE: 96.6 F | DIASTOLIC BLOOD PRESSURE: 68 MMHG | HEART RATE: 84 BPM | BODY MASS INDEX: 28.77 KG/M2 | HEIGHT: 66 IN | RESPIRATION RATE: 16 BRPM | OXYGEN SATURATION: 100 %

## 2023-03-17 DIAGNOSIS — Z11.59 NEED FOR HEPATITIS C SCREENING TEST: ICD-10-CM

## 2023-03-17 DIAGNOSIS — Z00.00 WELL FEMALE EXAM WITHOUT GYNECOLOGICAL EXAM: Primary | ICD-10-CM

## 2023-03-17 DIAGNOSIS — Z13.6 CARDIOVASCULAR SCREENING; LDL GOAL LESS THAN 160: ICD-10-CM

## 2023-03-17 LAB
CHOLEST SERPL-MCNC: 153 MG/DL
HCV AB SERPL QL IA: NONREACTIVE
HDLC SERPL-MCNC: 56 MG/DL
LDLC SERPL CALC-MCNC: 84 MG/DL
NONHDLC SERPL-MCNC: 97 MG/DL
TRIGL SERPL-MCNC: 63 MG/DL

## 2023-03-17 PROCEDURE — 86803 HEPATITIS C AB TEST: CPT | Performed by: FAMILY MEDICINE

## 2023-03-17 PROCEDURE — 36415 COLL VENOUS BLD VENIPUNCTURE: CPT | Performed by: FAMILY MEDICINE

## 2023-03-17 PROCEDURE — 80061 LIPID PANEL: CPT | Performed by: FAMILY MEDICINE

## 2023-03-17 PROCEDURE — 99385 PREV VISIT NEW AGE 18-39: CPT | Performed by: FAMILY MEDICINE

## 2023-03-17 ASSESSMENT — ENCOUNTER SYMPTOMS
SORE THROAT: 0
WEAKNESS: 0
FEVER: 0
FREQUENCY: 0
BREAST MASS: 0
DYSURIA: 0
MYALGIAS: 0
DIZZINESS: 0
NAUSEA: 0
PARESTHESIAS: 0
ABDOMINAL PAIN: 0
HEMATOCHEZIA: 0
SHORTNESS OF BREATH: 0
NERVOUS/ANXIOUS: 0
JOINT SWELLING: 0
ARTHRALGIAS: 0
HEMATURIA: 0
CHILLS: 0
DIARRHEA: 0
CONSTIPATION: 0
PALPITATIONS: 0
EYE PAIN: 0
HEADACHES: 0
COUGH: 0
HEARTBURN: 0

## 2023-03-17 ASSESSMENT — PAIN SCALES - GENERAL: PAINLEVEL: NO PAIN (0)
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
## 2023-04-19 ENCOUNTER — MYC MEDICAL ADVICE (OUTPATIENT)
Dept: FAMILY MEDICINE | Facility: CLINIC | Age: 33
End: 2023-04-19
Payer: COMMERCIAL

## 2023-06-30 ENCOUNTER — MYC MEDICAL ADVICE (OUTPATIENT)
Dept: FAMILY MEDICINE | Facility: CLINIC | Age: 33
End: 2023-06-30
Payer: COMMERCIAL

## 2023-08-07 ASSESSMENT — ENCOUNTER SYMPTOMS
CHILLS: 0
PALPITATIONS: 0
JOINT SWELLING: 0
COUGH: 0
DYSURIA: 0
NERVOUS/ANXIOUS: 0
HEARTBURN: 0
CONSTIPATION: 0
DIARRHEA: 0
FREQUENCY: 0
FEVER: 0
PARESTHESIAS: 0
ABDOMINAL PAIN: 0
HEMATURIA: 0
SORE THROAT: 0
WEAKNESS: 0
HEADACHES: 0
BREAST MASS: 0
HEMATOCHEZIA: 0
SHORTNESS OF BREATH: 0
NAUSEA: 0
EYE PAIN: 0
DIZZINESS: 0
ARTHRALGIAS: 0
MYALGIAS: 0

## 2023-08-10 ENCOUNTER — OFFICE VISIT (OUTPATIENT)
Dept: FAMILY MEDICINE | Facility: CLINIC | Age: 33
End: 2023-08-10
Payer: COMMERCIAL

## 2023-08-10 VITALS
OXYGEN SATURATION: 100 % | BODY MASS INDEX: 29.57 KG/M2 | DIASTOLIC BLOOD PRESSURE: 74 MMHG | TEMPERATURE: 97.1 F | HEIGHT: 66 IN | HEART RATE: 68 BPM | RESPIRATION RATE: 16 BRPM | WEIGHT: 184 LBS | SYSTOLIC BLOOD PRESSURE: 102 MMHG

## 2023-08-10 DIAGNOSIS — Z12.4 CERVICAL CANCER SCREENING: Primary | ICD-10-CM

## 2023-08-10 PROCEDURE — G0145 SCR C/V CYTO,THINLAYER,RESCR: HCPCS | Performed by: FAMILY MEDICINE

## 2023-08-10 PROCEDURE — 99213 OFFICE O/P EST LOW 20 MIN: CPT | Performed by: FAMILY MEDICINE

## 2023-08-10 PROCEDURE — 87624 HPV HI-RISK TYP POOLED RSLT: CPT | Performed by: FAMILY MEDICINE

## 2023-08-10 ASSESSMENT — ENCOUNTER SYMPTOMS
HEMATOCHEZIA: 0
DYSURIA: 0
ABDOMINAL PAIN: 0
EYE PAIN: 0
DIARRHEA: 0
DIZZINESS: 0
PARESTHESIAS: 0
WEAKNESS: 0
COUGH: 0
JOINT SWELLING: 0
HEMATURIA: 0
FEVER: 0
FREQUENCY: 0
ARTHRALGIAS: 0
BREAST MASS: 0
SORE THROAT: 0
PALPITATIONS: 0
HEARTBURN: 0
MYALGIAS: 0
NERVOUS/ANXIOUS: 0
CONSTIPATION: 0
HEADACHES: 0
SHORTNESS OF BREATH: 0
NAUSEA: 0
CHILLS: 0

## 2023-08-10 ASSESSMENT — PAIN SCALES - GENERAL: PAINLEVEL: NO PAIN (0)

## 2023-08-10 NOTE — PROGRESS NOTES
SUBJECTIVE:   CC: Wyatt is an 33 year old who presents for preventive health visit.   {(!) Visit Details have not yet been documented.  Please enter Visit Details and then use this list to pull in documentation. (Optional):226992}    Healthy Habits:     Getting at least 3 servings of Calcium per day:  Yes    Bi-annual eye exam:  Yes    Dental care twice a year:  Yes    Sleep apnea or symptoms of sleep apnea:  None    Diet:  Regular (no restrictions)    Frequency of exercise:  2-3 days/week    Duration of exercise:  15-30 minutes    Taking medications regularly:  Yes    Medication side effects:  Not applicable    Additional concerns today:  No          {Add if <65 person on Medicare  - Required Questions (Optional):067429}  {Outside tests to abstract? :900582}    {additional problems to add (Optional):647694}      Social History     Tobacco Use    Smoking status: Never     Passive exposure: Never    Smokeless tobacco: Never   Substance Use Topics    Alcohol use: No     {Rooming staff  Click this link to complete the Prescreen if response below is not for today's visit  Alcohol Use Prescreen >3 drinks/day or > 7 drinks/week.  If the prescreen question answer is YES, complete the full AUDIT  :976975}        8/7/2023     9:52 AM   Alcohol Use   Prescreen: >3 drinks/day or >7 drinks/week? No   {add AUDIT responses (Optional) (A score of 7 for adult men is an indication of hazardous drinking; a score of 8 or more is an indication of an alcohol use disorder.  A score of 7 or more for adult women is an indication of hazardous drinking or an alchohol use disorder):166518}  Reviewed orders with patient.  Reviewed health maintenance and updated orders accordingly - { :474966}  {Chronicprobdata (optional):471507}    Breast Cancer Screening:        3/10/2023     8:49 AM   Breast CA Risk Assessment (FHS-7)   Do you have a family history of breast, colon, or ovarian cancer? No / Unknown       {If any of the questions to the  BCRA (FHS-7) are answered yes, consider ordering referral for genetic counseling (Optional) :522422}  {AMB Mammogram Decision Support (Optional) :784348}  Pertinent mammograms are reviewed under the imaging tab.    History of abnormal Pap smear: { :519589}      Latest Ref Rng & Units 5/9/2019    11:17 AM 5/9/2019    10:30 AM 7/20/2017     6:38 PM   PAP / HPV   PAP (Historical)  NIL   NIL    HPV 16 DNA NEG^Negative  Negative     HPV 18 DNA NEG^Negative  Negative     Other HR HPV NEG^Negative  Negative       Reviewed and updated as needed this visit by clinical staff                  Reviewed and updated as needed this visit by Provider                 {HISTORY OPTIONS (Optional):091470}    Review of Systems   Constitutional:  Negative for chills and fever.   HENT:  Negative for congestion, ear pain, hearing loss and sore throat.    Eyes:  Negative for pain and visual disturbance.   Respiratory:  Negative for cough and shortness of breath.    Cardiovascular:  Negative for chest pain, palpitations and peripheral edema.   Gastrointestinal:  Negative for abdominal pain, constipation, diarrhea, heartburn, hematochezia and nausea.   Breasts:  Negative for tenderness, breast mass and discharge.   Genitourinary:  Negative for dysuria, frequency, genital sores, hematuria, pelvic pain, urgency, vaginal bleeding and vaginal discharge.   Musculoskeletal:  Negative for arthralgias, joint swelling and myalgias.   Skin:  Negative for rash.   Neurological:  Negative for dizziness, weakness, headaches and paresthesias.   Psychiatric/Behavioral:  Negative for mood changes. The patient is not nervous/anxious.      {FEMALE ROS (Optional):362717}     OBJECTIVE:   There were no vitals taken for this visit.  Physical Exam  {Exam Choices (Optional):253098}    {Diagnostic Test Results (Optional):595181}    ASSESSMENT/PLAN:   {Diag Picklist:052155}    {Patient advised of split billing (Optional):111089}      COUNSELING:  {FEMALE COUNSELING  "MESSAGES:214500::\"Reviewed preventive health counseling, as reflected in patient instructions\"}      BMI:   Estimated body mass index is 29.24 kg/m  as calculated from the following:    Height as of 3/17/23: 1.666 m (5' 5.6\").    Weight as of 3/17/23: 81.2 kg (179 lb).   {Weight Management Plan needed for ACO:304486}      She reports that she has never smoked. She has never been exposed to tobacco smoke. She has never used smokeless tobacco.      {Counseling Resources  [place] Preventive Services Task Force  Cholesterol Screening  Health diet/nutrition  Pooled Cohorts Equation Calculator  USDA's MyPlate  ASA Prophylaxis  Lung CA Screening  Osteoporosis prevention/bone health :502984}  {Breast Cancer Risk Calculator  BRCA-Related Cancer Risk Assessment FHS-7 Tool :052856}  Joel Martinez MD, MD  [facility]  "

## 2023-08-10 NOTE — PROGRESS NOTES
"  Assessment & Plan     (Z12.4) Cervical cancer screening  (primary encounter diagnosis)    Plan: Pap screen with HPV - recommended age 30 - 65         years                BMI:   Estimated body mass index is 30.15 kg/m  as calculated from the following:    Height as of this encounter: 1.664 m (5' 5.5\").    Weight as of this encounter: 83.5 kg (184 lb).           Joel Martinez MD, MD  [facility] is a 33 year old, presenting for the following health issues:  Patient is here today for Pap only    HPI     Pap smear        Review of Systems   Constitutional, HEENT, cardiovascular, pulmonary, gi and gu systems are negative, except as otherwise noted.      Objective    There were no vitals taken for this visit.  There is no height or weight on file to calculate BMI.  Physical Exam   GENERAL: healthy, alert and no distress   (female): normal female external genitalia, normal urethral meatus, vaginal mucosa, normal cervix without masses or discharge                      " Detail Level: Detailed

## 2023-08-14 LAB
BKR LAB AP GYN ADEQUACY: NORMAL
BKR LAB AP GYN INTERPRETATION: NORMAL
BKR LAB AP HPV REFLEX: NORMAL
BKR LAB AP LMP: NORMAL
BKR LAB AP PREVIOUS ABNORMAL: NORMAL
PATH REPORT.COMMENTS IMP SPEC: NORMAL
PATH REPORT.COMMENTS IMP SPEC: NORMAL
PATH REPORT.RELEVANT HX SPEC: NORMAL

## 2023-08-16 LAB
HUMAN PAPILLOMA VIRUS 16 DNA: NEGATIVE
HUMAN PAPILLOMA VIRUS 18 DNA: NEGATIVE
HUMAN PAPILLOMA VIRUS FINAL DIAGNOSIS: NORMAL
HUMAN PAPILLOMA VIRUS OTHER HR: NEGATIVE

## 2023-08-30 ENCOUNTER — MYC MEDICAL ADVICE (OUTPATIENT)
Dept: FAMILY MEDICINE | Facility: CLINIC | Age: 33
End: 2023-08-30
Payer: COMMERCIAL

## 2023-08-30 DIAGNOSIS — Z30.011 ENCOUNTER FOR INITIAL PRESCRIPTION OF CONTRACEPTIVE PILLS: Primary | ICD-10-CM

## 2023-08-30 RX ORDER — NORETHINDRONE ACETATE 5 MG
5 TABLET ORAL DAILY
Qty: 90 TABLET | Refills: 0 | Status: SHIPPED | OUTPATIENT
Start: 2023-08-30 | End: 2024-07-09

## 2023-08-30 NOTE — TELEPHONE ENCOUNTER
Patient requesting med to be prescribed per Fertility Clinic recommendations      Familia Gomez,TENISHAN, RN

## 2023-09-29 ENCOUNTER — LAB (OUTPATIENT)
Dept: LAB | Facility: CLINIC | Age: 33
End: 2023-09-29
Payer: COMMERCIAL

## 2023-09-29 DIAGNOSIS — Z31.49 ENCOUNTER FOR INVESTIGATION AND TESTING FOR PROCREATIVE MANAGEMENT: Primary | ICD-10-CM

## 2023-09-29 DIAGNOSIS — Z32.00 PREGNANCY EXAMINATION OR TEST, PREGNANCY UNCONFIRMED: ICD-10-CM

## 2023-09-29 LAB
AMPHETAMINES UR QL SCN: NORMAL
BARBITURATES UR QL SCN: NORMAL
BENZODIAZ UR QL SCN: NORMAL
BZE UR QL SCN: NORMAL
CANNABINOIDS UR QL SCN: NORMAL
FENTANYL UR QL: NORMAL
HCG UR QL: NEGATIVE
OPIATES UR QL SCN: NORMAL
PCP QUAL URINE (ROCHE): NORMAL

## 2023-09-29 PROCEDURE — 81025 URINE PREGNANCY TEST: CPT

## 2023-09-29 PROCEDURE — 80307 DRUG TEST PRSMV CHEM ANLYZR: CPT

## 2023-10-02 ENCOUNTER — MYC MEDICAL ADVICE (OUTPATIENT)
Dept: FAMILY MEDICINE | Facility: CLINIC | Age: 33
End: 2023-10-02
Payer: COMMERCIAL

## 2023-10-03 NOTE — TELEPHONE ENCOUNTER
spoke with the patient she will go back to her fertility clinic where she is going to be [employer] get and have the test drawn there.

## 2023-10-24 NOTE — PROGRESS NOTES
CHIEF COMPLAINT: ER followup    HISTORY OF PRESENT ILLNESS: Carlos is a 6 year old without significant past medical history who presents for a followup from the ER.  She has been seen in the ER for chest pain which happens at night when she is in bed.  It has happened 4-5 times per month.  She has had feelings that her heart was beating quickly, the room was getting smaller and she was worried.  She also has complained of a sore throat for the last 3 days, occasional headache and feeling dizzy.  No fever.  Her symptoms at night have gone away with talking with mom.      ALLERGIES:  No Known Allergies    MEDICATIONS: none    PHYSICAL EXAM:  Visit Vitals  BP 96/58   Temp 99.1 °F (37.3 °C) (Oral)   Ht 4' 3.25\" (1.302 m)   Wt 30.9 kg (68 lb 3.2 oz)   BMI 18.26 kg/m²     GENERAL: The patient is awake, alert and interactive, in no acute distress. Smiling and playful.   HEENT: Atraumatic, normocephalic. Pupils: Equal, round, and reactive to light bilaterally. Tympanic membranes are within normal limits. Nares are patent. Oropharynx and mucous membranes are moist. Tonsils are 3+ in size and with erythema.   NECK: Supple with shoddy anterior cervical lymphadenopathy.  LUNGS: Clear to auscultation bilaterally. No wheezes, rales, or rhonchi.  CHEST: Regular rate and rhythm without murmurs, rubs, or gallops.  ABDOMEN: Soft, nontender, and nondistended, with normal abdominal bowel sounds. No hepatosplenomegaly.  EXTREMITIES: Normal strength and tone. Capillary refill is less than 2 seconds.  NEUROLOGIC: Grossly nonfocal.  Deep tendon reflexes 2+ bilaterally.  SKIN: No rashes.    DIAGNOSTIC TESTS: A PCR based strep was performed and was negative.    ASSESSMENT:   1. Viral pharyngitis    2. Anxiety        PLAN: The supportive care measures in the care of the viral sore throat were discussed including using tylenol or motrin as needed for pain or fever, the dose of which was discussed.  We used a PCR based rapid strep screening  FHT's 170-180 with brief drops to 150's. Tachy vs prolonged accelerations. Maternal temperature 98.9 oral. Fluid bolus started. Pitocin cut in half, down to 6mu. Baby is active. Will monitor closely.      which no longer requires culture.  Continue to encourage fluids and watch for signs or symptoms of dehydration.  I did give a handout with community options for therapy to help with the anxiety.  I also discussed keeping a journal to know when the episodes are happening to see if we can find a trigger that could be helped to make it better.  We discussed concerning features to watch for and when to have a more urgent evaluation.  We discussed ways to help calm the breathing and to focus the attention on other things to help her recover from the worry faster.  Parents will call with changes in the sydnie condition, with questions or concerns.

## 2024-02-16 ENCOUNTER — PATIENT OUTREACH (OUTPATIENT)
Dept: CARE COORDINATION | Facility: CLINIC | Age: 34
End: 2024-02-16
Payer: COMMERCIAL

## 2024-03-01 ENCOUNTER — PATIENT OUTREACH (OUTPATIENT)
Dept: CARE COORDINATION | Facility: CLINIC | Age: 34
End: 2024-03-01
Payer: COMMERCIAL

## 2024-04-15 ENCOUNTER — VIRTUAL VISIT (OUTPATIENT)
Dept: FAMILY MEDICINE | Facility: CLINIC | Age: 34
End: 2024-04-15
Payer: COMMERCIAL

## 2024-04-15 DIAGNOSIS — O09.529 SUPERVISION OF HIGH-RISK PREGNANCY OF ELDERLY MULTIGRAVIDA: Primary | ICD-10-CM

## 2024-04-15 PROCEDURE — 99207 PR NO CHARGE NURSE ONLY: CPT | Mod: 93

## 2024-04-15 NOTE — PATIENT INSTRUCTIONS
Learning About Pregnancy  Your Care Instructions     Your health in the early weeks of your pregnancy is particularly important for your baby's health. Take good care of yourself. Anything you do that harms your body can also harm your baby.  Make sure to go to all of your doctor appointments. Regular checkups will help keep you and your baby healthy.  How can you care for yourself at home?  Diet    Choose healthy foods like fruits, vegetables, whole grains, lean proteins, and healthy fats.     Choose foods that are good sources of calcium, iron, and folate. You can try dairy products, dark leafy greens, fortified orange juice and cereals, almonds, broccoli, dried fruit, and beans.     Do not skip meals or go for many hours without eating. If you are nauseated, try to eat a small, healthy snack every 2 to 3 hours.     Avoid fish that are high in mercury. These include shark, swordfish, tom mackerel, marlin, orange roughy, and bigeye tuna, as well as tilefish from the [facility].     It's okay to eat up to 8 to 12 ounces a week of fish that are low in mercury or up to 4 ounces a week of fish that have medium levels of mercury. Some fish that are low in mercury are salmon, shrimp, canned light tuna, cod, and tilapia. Some fish that have medium levels of mercury are halibut and white albacore tuna.     Drink plenty of fluids. If you have kidney, heart, or liver disease and have to limit fluids, talk with your doctor before you increase the amount of fluids you drink.     Limit caffeine to about 200 to 300 mg per day. On average, a cup of brewed coffee has around 80 to 100 mg of caffeine.     Do not drink alcohol, such as beer, wine, or hard liquor.     Take a multivitamin that contains at least 400 micrograms (mcg) of folic acid to help prevent birth defects. Fortified cereal and whole wheat bread are good additional sources of folic acid.     Increase the calcium in your diet. Try to drink a quart of skim milk  each day. You may also take calcium supplements and choose foods such as cheese and yogurt.   Lifestyle    Make sure you go to your follow-up appointments.     Get plenty of rest. You may be unusually tired while you are pregnant.     Get at least 30 minutes of exercise on most days of the week. Walking is a good choice. If you have not exercised in the past, start out slowly. Take several short walks each day.     Do not smoke. If you need help quitting, talk to your doctor about stop-smoking programs. These can increase your chances of quitting for good.     Do not touch cat feces or litter boxes. Also, wash your hands after you handle raw meat, and fully cook all meat before you eat it. Wear gloves when you work in the yard or garden, and wash your hands well when you are done. Cat feces, raw or undercooked meat, and contaminated dirt can cause an infection that may harm your baby or lead to a miscarriage.     Avoid things that can make your body too hot and may be harmful to your baby, such as a hot tub or sauna. Or talk with your doctor before doing anything that raises your body temperature. Your doctor can tell you if it's safe.     Avoid chemical fumes, paint fumes, or poisons.     Do not use illegal drugs, marijuana, or alcohol.   Medicines    Review all of your medicines with your doctor. Some of your routine medicines may need to be changed to protect your baby.     Use acetaminophen (Tylenol) to relieve minor problems, such as a mild headache or backache or a mild fever with cold symptoms. Do not use nonsteroidal anti-inflammatory drugs (NSAIDs), such as ibuprofen (Advil, Motrin) or naproxen (Aleve), unless your doctor says it is okay.     Do not take two or more pain medicines at the same time unless the doctor told you to. Many pain medicines have acetaminophen, which is Tylenol. Too much acetaminophen (Tylenol) can be harmful.     Take your medicines exactly as prescribed. Call your doctor if you  "think you are having a problem with your medicine.   To manage morning sickness    Keep food in your stomach, but not too much at once. Try eating five or six small meals a day instead of three large meals.     For nausea when you wake up, eat a small snack, such as a couple of crackers or pretzels, before rising. Allow a few minutes for your stomach to settle before you slowly get up.     Try to avoid smells and foods that make you feel nauseated. High-fat or greasy foods, milk, and coffee may make nausea worse. Some foods that may be easier to tolerate include cold, spicy, sour, and salty foods.     Drink enough fluids. Water and other caffeine-free drinks are good choices.     Take your prenatal vitamins at night on a full stomach.     Try foods and drinks made with shakila. Shakila may help with nausea.     Get lots of rest. Morning sickness may be worse when you are tired.     Talk to your doctor about over-the-counter products, such as vitamin B6 or doxylamine, to help relieve symptoms.     Try a P6 acupressure wrist band. These anti-nausea wristbands help some people.   Follow-up care is a key part of your treatment and safety. Be sure to make and go to all appointments, and call your doctor if you are having problems. It's also a good idea to know your test results and keep a list of the medicines you take.  Where can you learn more?  Go to [email or web address]/[email or web address]  Enter E868 in the search box to learn more about \"Learning About Pregnancy.\"  Current as of: July 10, 2023               Content Version: 14.0    4740-8397 [employer].   Care instructions adapted under license by your healthcare professional. If you have questions about a medical condition or this instruction, always ask your healthcare professional. [employer] disclaims any warranty or liability for your use of this information.      Weeks 6 to 10 of Your Pregnancy: Care Instructions  During these weeks of " "pregnancy, your body goes through many changes. You may start to feel different, both in your body and your emotions. Each pregnancy is different, so there's no \"right\" way to feel. These early weeks are a time to make healthy choices for you and your pregnancy.    Take a daily prenatal vitamin. Choose one with folic acid in it.    Avoid alcohol, tobacco, and drugs (including marijuana). If you need help quitting, talk to your doctor.    Drink plenty of liquids.  Be sure to drink enough water. And limit sodas, other sweetened drinks, and caffeine.     Choose foods that are good sources of calcium, iron, and folate.  You can try dairy products, dark leafy greens, fortified orange juice and cereals, almonds, broccoli, dried fruit, and beans.     Avoid foods that may be harmful.  Don't eat raw meat, deli meat, raw seafood, or raw eggs. Avoid soft cheese and unpasteurized dairy, like Brie and blue cheese. And don't eat fish that contains a lot of mercury, like shark and swordfish.     Don't touch paty litter or cat poop.  They can cause an infection that could be harmful during pregnancy.     Avoid things that can make your body too hot.  For example, avoid hot tubs and saunas.     Soothe morning sickness.  Try eating 5 or 6 small meals a day, getting some fresh air, or using stacie to control symptoms.     Ask your doctor about flu and COVID-19 shots.  Getting them can help protect against infection.   Follow-up care is a key part of your treatment and safety. Be sure to make and go to all appointments, and call your doctor if you are having problems. It's also a good idea to know your test results and keep a list of the medicines you take.  Where can you learn more?  Go to [email or web address]://[email or web address]/[email or web address]  Enter G112 in the search box to learn more about \"Weeks 6 to 10 of Your Pregnancy: Care Instructions.\"  Current as of: July 10, 2023               Content Version: 14.0    7839-4469 [employer] Incorporated. "   Care instructions adapted under license by your healthcare professional. If you have questions about a medical condition or this instruction, always ask your healthcare professional. [employer] disclaims any warranty or liability for your use of this information.         Managing Morning Sickness (01:55)  Your health professional recommends that you watch this short online health video.  Learn tips for dealing with morning sickness, no matter what time of day you have it.  Purpose:  Gives tips for managing morning sickness, including eating small low-fat meals and avoiding caffeine and spicy food.  Goal:  The user will learn tips for dealing with morning sickness during pregnancy.     How to watch the video    Scan the QR code   OR Visit the website    [email or web address]   Current as of: July 10, 2023               Content Version: 14.0    4699-7060 [employer].   Care instructions adapted under license by your healthcare professional. If you have questions about a medical condition or this instruction, always ask your healthcare professional. [employer] disclaims any warranty or liability for your use of this information.      Pregnancy and Heartburn: Care Instructions  Overview     Heartburn is a common problem during pregnancy.  Heartburn happens when stomach acid backs up into the tube that carries food to the stomach. This tube is called the esophagus. Early in pregnancy, heartburn is caused by hormone changes that slow down digestion. Later on, it's also caused by the large uterus pushing up on the stomach.  Even though you can't fix the cause, there are things you can do to get relief. Treating heartburn during pregnancy focuses first on making lifestyle changes, like changing what and how you eat, and on taking medicines.  Heartburn usually improves or goes away after childbirth.  Follow-up care is a key part of your treatment and safety. Be sure to make  "and go to all appointments, and call your doctor if you are having problems. It's also a good idea to know your test results and keep a list of the medicines you take.  How can you care for yourself at home?  Eat small, frequent meals.  Avoid foods that make your symptoms worse, such as chocolate, peppermint, and spicy foods. Avoid drinks with caffeine, such as coffee, tea, and sodas.  Avoid bending over or lying down after meals.  Take a short walk after you eat.  If heartburn is a problem at night, do not eat for 2 hours before bedtime.  Take antacids like Mylanta, Maalox, Rolaids, or Tums. Do not take antacids that have sodium bicarbonate, magnesium trisilicate, or aspirin. Be careful when you take over-the-counter antacid medicines. Many of these medicines have aspirin in them. While you are pregnant, do not take aspirin or medicines that contain aspirin unless your doctor says it is okay.  If you're not getting relief, talk to your doctor. You may be able to take a stronger acid-reducing medicine.  When should you call for help?   Call your doctor now or seek immediate medical care if:    You have new or worse belly pain.     You are vomiting.   Watch closely for changes in your health, and be sure to contact your doctor if:    You have new or worse symptoms of reflux.     You are losing weight.     You have trouble or pain swallowing.     You do not get better as expected.   Where can you learn more?  Go to https://www.isocket.net/patiented  Enter U946 in the search box to learn more about \"Pregnancy and Heartburn: Care Instructions.\"  Current as of: July 10, 2023               Content Version: 14.0    6839-4035 [employer].   Care instructions adapted under license by your healthcare professional. If you have questions about a medical condition or this instruction, always ask your healthcare professional. [employer] disclaims any warranty or liability for your use of this " information.      Constipation: Care Instructions  Overview     Constipation means that you have a hard time passing stools (bowel movements). People pass stools from 3 times a day to once every 3 days. What is normal for you may be different. Constipation may occur with pain in the rectum and cramping. The pain may get worse when you try to pass stools. Sometimes there are small amounts of bright red blood on toilet paper or the surface of stools. This is because of enlarged veins near the rectum (hemorrhoids).  A few changes in your diet and lifestyle may help you avoid ongoing constipation. Your doctor may also prescribe medicine to help loosen your stool.  Some medicines can cause constipation. These include pain medicines and antidepressants. Tell your doctor about all the medicines you take. Your doctor may want to make a medicine change to ease your symptoms.  Follow-up care is a key part of your treatment and safety. Be sure to make and go to all appointments, and call your doctor if you are having problems. It's also a good idea to know your test results and keep a list of the medicines you take.  How can you care for yourself at home?  Drink plenty of fluids. If you have kidney, heart, or liver disease and have to limit fluids, talk with your doctor before you increase the amount of fluids you drink.  Include high-fiber foods in your diet each day. These include fruits, vegetables, beans, and whole grains.  Get at least 30 minutes of exercise on most days of the week. Walking is a good choice. You also may want to do other activities, such as running, swimming, cycling, or playing tennis or team sports.  Take a fiber supplement, such as Citrucel or Metamucil, every day. Read and follow all instructions on the label.  Schedule time each day for a bowel movement. A daily routine may help. Take your time having a bowel movement, but don't sit for more than 10 minutes at a time. And don't strain too  "much.  Support your feet with a small step stool when you sit on the toilet. This helps flex your hips and places your pelvis in a squatting position.  Your doctor may recommend an over-the-counter laxative to relieve your constipation. Examples are Milk of Magnesia and MiraLax. Read and follow all instructions on the label. Do not use laxatives on a long-term basis.  When should you call for help?   Call your doctor now or seek immediate medical care if:    You have new or worse belly pain.     You have new or worse nausea or vomiting.     You have blood in your stools.   Watch closely for changes in your health, and be sure to contact your doctor if:    Your constipation is getting worse.     You do not get better as expected.   Where can you learn more?  Go to https://www.[employer].net/patiented  Enter P343 in the search box to learn more about \"Constipation: Care Instructions.\"  Current as of: October 19, 2023               Content Version: 14.0    3578-9193 [employer].   Care instructions adapted under license by your healthcare professional. If you have questions about a medical condition or this instruction, always ask your healthcare professional. [employer] disclaims any warranty or liability for your use of this information.      Learning About High-Iron Foods  What foods are high in iron?     The foods you eat contain nutrients, such as vitamins and minerals. Iron is a nutrient. Your body needs the right amount to stay healthy and work as it should. You can use the list below to help you make choices about which foods to eat.  Here are some foods that contain iron. They have 1 to 2 milligrams of iron per serving.  Fruits  Figs (dried), 5 figs  Vegetables  Asparagus (canned), 6 haines  [place]iana, beet, Swiss chard, or turnip greens, 1 cup  Dried peas, cooked,   cup  Seaweed, spirulina (dried),   cup  Spinach, (cooked)   cup or (raw) 1 cup  Grains  Cereals, fortified with iron, 1 " "cup  Grits (instant, cooked), fortified with iron,   cup  Meats and other protein foods  Beans (kidney, lima, navy, white), canned or cooked,   cup  Beef or lamb, 3 oz  Chicken giblets, 3 oz  Chickpeas (garbanzo beans),   cup  Liver of beef, lamb, or pork, 3 oz  Oysters (cooked), 3 oz  Sardines (canned), 3 oz  Soybeans (boiled),   cup  Tofu (firm),   cup  Work with your doctor to find out how much of this nutrient you need. Depending on your health, you may need more or less of it in your diet.  Where can you learn more?  Go to https://www.[employer].net/patiented  Enter R005 in the search box to learn more about \"Learning About High-Iron Foods.\"  Current as of: September 20, 2023               Content Version: 14.0    3290-0952 [employer].   Care instructions adapted under license by your healthcare professional. If you have questions about a medical condition or this instruction, always ask your healthcare professional. [employer] disclaims any warranty or liability for your use of this information.      Rh Antibodies Screening During Pregnancy: About This Test  What is it?     The Rh antibodies screening test is a blood test. It checks your blood for Rh antibodies. If you have Rh-negative blood and have been exposed to Rh-positive blood, your immune system may make antibodies to attack the Rh-positive blood. When a pregnant woman has these antibodies, it is called Rh sensitization.  Why is this test done?  The Rh antibodies screening test is done during pregnancy to find out if your baby is at risk for Rh disease. This can happen if you have Rh-negative blood and your baby has Rh-positive blood. If your Rh-negative blood mixes with Rh-positive blood, your immune system will make antibodies to attack the Rh-positive blood.  During pregnancy, these antibodies could attach to the baby's red blood cells. This can cause your baby to have serious health problems. The results of this test " "will help your doctor know how to best care for you and your baby during your pregnancy.  How do you prepare for the test?  In general, there's nothing you have to do before this test, unless your doctor tells you to.  How is the test done?  A health professional uses a needle to take a blood sample, usually from the arm.  What happens after the test?  You will probably be able to go home right away. It depends on the reason for the test.  You can go back to your usual activities right away.  Follow-up care is a key part of your treatment and safety. Be sure to make and go to all appointments, and call your doctor if you are having problems. It's also a good idea to keep a list of the medicines you take. Ask your doctor when you can expect to have your test results.  Where can you learn more?  Go to https://[email or web address].[employer] Logic/patiented  Enter P722 in the search box to learn more about \"Rh Antibodies Screening During Pregnancy: About This Test.\"  Current as of: July 10, 2023               Content Version: 14.0    4676-1157 [employer].   Care instructions adapted under license by your healthcare professional. If you have questions about a medical condition or this instruction, always ask your healthcare professional. [employer] disclaims any warranty or liability for your use of this information.      Learning About Preventing Rh Disease  What is Rh disease?     Rh disease can be a serious problem in pregnancy. It happens when substances called antibodies in the mother's blood cause red blood cells in her baby's blood to be destroyed. This can occur when the blood types of a mother and her baby do not match.  All blood has an Rh factor. This is what makes a blood type positive or negative. When you are Rh-negative, your baby may be Rh-negative or Rh-positive. If your baby has Rh-positive blood and it mixes with yours, your body will make antibodies. This is called Rh sensitization.  Most of " "the time, this is not a problem in a first pregnancy. But in future pregnancies, it could cause Rh disease.  A  with Rh disease has mild anemia and may have jaundice. In severe cases, anemia, jaundice, and swelling can be very dangerous or fatal. Some babies need to be delivered early. Some need special care in the NICU. A very sick baby will need a blood transfusion before or after birth.  Fortunately, Rh sensitization is usually easy to prevent.  That's why it's important to get your Rh status checked in your first trimester. It doesn't cause any warning signs. A blood test is the only way to know if you are Rh-sensitive or are at risk for it.  How can you prevent Rh disease?  If you are Rh-negative, your doctor gives you an Rh immune globulin shot (such as RhoGAM). It helps prevent your body from making the antibodies that attack your baby's red blood cells.  Timing is important. You need the shot at certain times during your pregnancy. And you need one anytime there is a chance that your baby's blood might mix with yours. That can happen with certain prenatal tests or when you have pregnancy bleeding, such as:  Right after any pregnancy loss, amniocentesis, or CVS testing.  After turning of a breech baby.  Before and maybe after childbirth. Your doctor gives you a shot around week 28. If your  is Rh-positive, you will have another shot.  Follow-up care is a key part of your treatment and safety. Be sure to make and go to all appointments, and call your doctor if you are having problems. It's also a good idea to know your test results and keep a list of the medicines you take.  Where can you learn more?  Go to https://www.[employer].net/patiented  Enter W177 in the search box to learn more about \"Learning About Preventing Rh Disease.\"  Current as of: July 10, 2023               Content Version: 14.0    7553-0296 Healthwise, Incorporated.   Care instructions adapted under license by [facility] healthcare " professional. If you have questions about a medical condition or this instruction, always ask your healthcare professional. Healthwise, [facility] disclaims any warranty or liability for your use of this information.      Learning About Rh Immunoglobulin Shots  Introduction     An Rh immunoglobulin shot is given to pregnant women who have Rh-negative blood.  You may have Rh-negative blood, and your baby may have Rh-positive blood. If the two types of blood mix, your body will make antibodies. This is called Rh sensitization. Most of the time, this is not a problem the first time you're pregnant. But it could cause problems in future pregnancies.  This shot keeps your body from making the antibodies. You get the shot around 28 weeks of pregnancy. After the birth, your baby's blood is tested. If the blood is Rh positive, you will get another shot. You may also get the shot if you have vaginal bleeding while you are pregnant or if you have a miscarriage. These shots protect future pregnancies.  Women with Rh negative blood will need this shot each time they get pregnant.  Example  Rh immunoglobulin (HypRho-D, MICRhoGAM, and RhoGAM)  Possible side effects  Rare side effects may include:  Some mild pain where you got the shot.  A slight fever.  An allergic reaction.  You may have other side effects not listed here. Check the information that comes with your medicine.  What to know about taking this medicine  You may need more than one shot. You may need the shot again:  After amniocentesis, fetal blood sampling, or chorionic villus sampling tests.  If you have bleeding in your second or third trimester.  After turning of a breech baby.  After an injury to the belly while you are pregnant.  After a miscarriage or an .  Before or right after treatment for an ectopic or a partial molar pregnancy.  Tell your doctor if you have any allergies or have had a bad response to medicines in the past.  If you get this shot  "within 3 months of getting a live-virus vaccine, the vaccine may not work. Your doctor will tell you if you need more vaccine.  Check with your doctor or pharmacist before you use any other medicines. This includes over-the-counter medicines. Make sure your doctor knows all of the medicines, vitamins, herbs, and supplements you take. Taking some medicines at the same time can cause problems.  Where can you learn more?  Go to https://[email or web address].Picitup/patiented  Enter V615 in the search box to learn more about \"Learning About Rh Immunoglobulin Shots.\"  Current as of: July 10, 2023               Content Version: 14.0    9035-8728 [employer].   Care instructions adapted under license by your healthcare professional. If you have questions about a medical condition or this instruction, always ask your healthcare professional. [employer] disclaims any warranty or liability for your use of this information.      Rubella (French Measles): Care Instructions  Overview  Rubella, also called French measles or 3-day measles, is a disease caused by a virus. It spreads by coughs, sneezes, and close contact. Rubella usually is mild and does not cause long-term problems. But if you are pregnant and get it, you can give the disease to your unborn baby. This can cause serious birth defects.  While you have rubella, you may get a rash and a mild fever, and the lymph glands in your neck may swell. Older children often have a fever, eye pain, a sore throat, and body aches. You can relieve most symptoms with care at home. Avoid being around others, especially pregnant people, until your rash has been gone for at least 4 days. People who have not had this disease before or have not had the vaccine have the greatest chance of getting the virus.  Follow-up care is a key part of your treatment and safety. Be sure to make and go to all appointments, and call your doctor if you are having problems. It's also a good " "idea to know your test results and keep a list of the medicines you take.  How can you care for yourself at home?  Drink plenty of fluids. If you have kidney, heart, or liver disease and have to limit fluids, talk with your doctor before you increase the amount of fluids you drink.  Get plenty of rest to help your body heal.  Take an over-the-counter pain medicine, such as acetaminophen (Tylenol), ibuprofen (Advil, Motrin), or naproxen (Aleve), to reduce fever and discomfort. Read and follow all instructions on the label. Do not give aspirin to anyone younger than 20. It has been linked to Reye syndrome, a serious illness.  Do not take two or more pain medicines at the same time unless the doctor told you to. Many pain medicines have acetaminophen, which is Tylenol. Too much acetaminophen (Tylenol) can be harmful.  Try not to scratch the rash. Put cold, wet cloths on the rash to reduce itching.  Do not smoke. Smoking can make your symptoms worse. If you need help quitting, talk to your doctor about stop-smoking programs and medicines. These can increase your chances of quitting for good.  Avoid contact with people who have never had rubella and who have not been immunized.  When should you call for help?   Call your doctor now or seek immediate medical care if:    You have a fever with a stiff neck or a severe headache.     You are sensitive to light or feel very sleepy or confused.   Watch closely for changes in your health, and be sure to contact your doctor if:    You do not get better as expected.   Where can you learn more?  Go to https://www.[employer].net/patiented  Enter B812 in the search box to learn more about \"Rubella (Amharic Measles): Care Instructions.\"  Current as of: June 12, 2023               Content Version: 14.0    3156-5363 [employer].   Care instructions adapted under license by your healthcare professional. If you have questions about a medical condition or this instruction, " always ask your healthcare professional. Healthwise, [facility] disclaims any warranty or liability for your use of this information.      Gonorrhea and Chlamydia: About These Tests  What is it?  These tests use a sample of urine or other body fluid to look for the bacteria that cause these sexually transmitted infections (STIs). The fluid sample can come from the cervix, vagina, rectum, throat, or eyes.  Why is this test done?  These tests may be done to:  Find out if symptoms are caused by gonorrhea or chlamydia.  Check people who are at high risk of being infected with gonorrhea or chlamydia.  Retest people several months after they have been treated for gonorrhea or chlamydia.  Check for infection in your  if you had a gonorrhea or chlamydia infection at the time of delivery.  How can you prepare for the test?  If you are going to have a urine test, do not urinate for at least 1 hour before the test.  If you think you may have chlamydia or gonorrhea, don't have sexual intercourse until you get your test results. And you may want to have tests for other STIs, such as HIV.  How is the test done?  For a direct sample, a swab is used to collect body fluid from the cervix, vagina, rectum, throat, or eyes. Your doctor may collect the sample. Or you may be given instructions on how to collect your own sample.  For a urine sample, you will collect the urine that comes out when you first start to urinate. Don't wipe the genital area clean before you urinate.  How long does the test take?  The test will take a few minutes.  What happens after the test?  You will be able to go home right away.  You can go back to your usual activities right away.  If you do have an infection, don't have sexual intercourse for 7 days after you start treatment. And your sex partner(s) should also be treated.  Follow-up care is a key part of your treatment and safety. Be sure to make and go to all appointments, and call your doctor  "if you are having problems. It's also a good idea to keep a list of the medicines you take. Ask your doctor when you can expect to have your test results.  Where can you learn more?  Go to https://www.[email or web address].net/patiented  Enter K976 in the search box to learn more about \"Gonorrhea and Chlamydia: About These Tests.\"  Current as of: November 27, 2023               Content Version: 14.0    6735-8397 [employer].   Care instructions adapted under license by your healthcare professional. If you have questions about a medical condition or this instruction, always ask your healthcare professional. [employer] disclaims any warranty or liability for your use of this information.      Trichomoniasis: About This Test  What is it?     This test uses a sample of urine or other body fluid to look for the tiny parasite that causes trichomoniasis (also called trich). The fluid sample can come from the vagina, cervix, or urethra. Your doctor may choose to use one or more of many available tests.  Why is it done?  A trich test may be done to:  Find out if symptoms are caused by trich.  Check people who are at high risk for being infected with trich.  Check after treatment to make sure that the infection is gone.  How do you prepare for the test?  If you are going to have a urine test, do not urinate for at least 1 hour before the test.  How is the test done?  For a direct sample, a swab is used to collect body fluid from the cervix, vagina, or urethra. Your doctor may collect the sample. Or you may be given instructions on how to collect your own sample.  For a urine sample, you will collect the urine that comes out when you first start to urinate. Don't wipe the area clean before you urinate.  How long does the test take?  It will take a few minutes to collect a sample.  What happens after the test?  You can go home right away.  You can go back to your usual activities right away.  You may get the " test results the same day or several days later. It depends on the test used.  If you do have an infection, don't have sexual intercourse for 7 days after you start treatment. Your sex partner or partners should also be treated.  Follow-up care is a key part of your treatment and safety. Be sure to make and go to all appointments, and call your doctor if you are having problems. Ask your doctor when you can expect to have your test results.  Current as of: November 27, 2023               Content Version: 14.0    4766-7436 [employer].   Care instructions adapted under license by your healthcare professional. If you have questions about a medical condition or this instruction, always ask your healthcare professional. [employer] disclaims any warranty or liability for your use of this information.      HIV Testing: Care Instructions  Overview  You can get tested for the human immunodeficiency virus (HIV). Most doctors use a blood test to check for HIV antibodies and antigens in your blood. It may also check for the genetic material (RNA) of HIV. Some tests use saliva to check for HIV antibodies. But these aren't as accurate. For example, they may give a false result if you've just been infected.  What do the results mean?    Normal (negative)    No HIV antibodies, antigens, or RNA were found.  You may need more testing. It can make sure your test results are correct.    Uncertain (indeterminate)    Test results didn't clearly show if you have an HIV infection.  HIV antibodies or antigens may not have formed yet.  Some other type of antibody or antigen may have affected the results.  You will need another test to be sure.    Abnormal (positive)    HIV antibodies, antigens, or RNA were found.  If you haven't had an RNA test yet, one will be done. If it's positive, you have HIV.  If your test result is positive, your doctor will talk to you. You will discuss starting treatment.  Follow-up care  "is a key part of your treatment and safety. Be sure to make and go to all appointments, and call your doctor if you are having problems. It's also a good idea to know your test results and keep a list of the medicines you take.  Where can you learn more?  Go to https://www.[employer].net/patiented  Enter T792 in the search box to learn more about \"HIV Testing: Care Instructions.\"  Current as of: June 12, 2023               Content Version: 14.0    8815-1355 [employer].   Care instructions adapted under license by your healthcare professional. If you have questions about a medical condition or this instruction, always ask your healthcare professional. [employer] disclaims any warranty or liability for your use of this information.      Hepatitis C Virus Tests: About These Tests  What are they?     Hepatitis C virus tests are blood tests that check for substances in the blood that show whether you have hepatitis C now or had it in the past. The tests can also tell you what type of hepatitis C you have and how severe the disease is. This can help your doctor with treatment.  If the tests show that you have long-term hepatitis C, you need to take steps to prevent spreading the disease.  Why are these tests done?  You may need these tests if:  You have symptoms of hepatitis.  You may have been exposed to the virus. You are more likely to have been exposed to the virus if you inject drugs or are exposed to body fluids (such as if you are a health care worker).  You've had other tests that show you have liver problems.  You are 18 to 79 years old.  You have an HIV infection.  The tests also are done to help your doctor decide about your treatment and see how well it works.  How do you prepare for the test?  In general, there's nothing you have to do before this test, unless your doctor tells you to.  How is the test done?  A health professional uses a needle to take a blood sample, usually from " "the arm.  What happens after these tests?  You will probably be able to go home right away.  You can go back to your usual activities right away.  Follow-up care is a key part of your treatment and safety. Be sure to make and go to all appointments, and call your doctor if you are having problems. It's also a good idea to keep a list of the medicines you take. Ask your doctor when you can expect to have your test results.  Where can you learn more?  Go to https://www.[employer].net/patiented  Enter W551 in the search box to learn more about \"Hepatitis C Virus Tests: About These Tests.\"  Current as of: June 12, 2023               Content Version: 14.0    2172-1102 [employer].   Care instructions adapted under license by your healthcare professional. If you have questions about a medical condition or this instruction, always ask your healthcare professional. [employer] disclaims any warranty or liability for your use of this information.      Learning About Fetal Ultrasound Results  What is a fetal ultrasound?     Fetal ultrasound is a test that lets your doctor see an image of your baby. Your doctor learns information about your baby from this picture. You may find out, for example, if you are having a boy or a girl. But the main reason you have this test is to get information about your baby's health.  (You may hear your baby called a fetus. This is a common medical term for a baby that's growing in the mother's uterus.)  What kind of information can you learn from this test?  The findings of an ultrasound fall into two categories, normal and abnormal.  Normal  The fetus is the right size for its age.  The placenta is the expected size and does not cover the cervix.  There is enough amniotic fluid in the uterus.  No birth defects can be seen.  Abnormal  The fetus is small or large for its age.  The placenta covers the cervix.  There is too much or too little amniotic fluid in the " uterus.  The fetus may have a birth defect.  What does an abnormal result mean?  Abnormal seems to imply that something is wrong with your baby. But what it means is that the test has shown something the doctor wants to take a closer look at.  And that's what happens next. Your doctor will talk to you about what further test or tests you may need.  What do the results mean?  Some of the things your doctor may see on an abnormal ultrasound include:  Echogenic bowel.  The bowel looks very bright on the screen. This could mean that there's blood in the bowel. Or it could mean that something is blocking the small bowel.  Increased nuchal translucency.  The ultrasound measures the thickness at the back of the baby's neck. An increase in thickness is sometimes an early sign of Down syndrome.  Increased or decreased amniotic fluid.  The doctor will look for a reason for the level of amniotic fluid and will watch the pregnancy closely as it progresses.  Large ventricles.  Ventricles in the brain look larger than they should. Your doctor may take a closer look at the brain.  Renal pyelectasis/hydronephrosis.  The ultrasound measures the fluid around the kidney. If there is more fluid than expected, there is a chance of urinary tract or kidney problems.  Short long bones.  The ultrasound measures certain arm and leg bones. A long bone (humerus or femur) that is shorter than average could be a sign of Down syndrome.  Subchorionic hemorrhage.  An ultrasound can show bleeding under one of the membranes that surrounds the fetus. Some women don't have symptoms of bleeding. The ultrasound can find this problem when women are not bleeding from their vagina. Women who have this condition have a slightly higher chance of miscarriage.  What do you do now?  Take a deep breath, and let it out. Keep in mind that an abnormal finding on an ultrasound, after it's coupled with more information, may:  Turn out to be nothing.  Turn out to be  "something mild that won't affect the baby.  Turn out to be something more serious. But if this happens, early diagnosis helps you and your doctor plan treatment options sooner rather than later.  Your medical team is there for you. So are your family and friends. Ask questions, and get the help and support you need.  Follow-up care is a key part of your treatment and safety. Be sure to make and go to all appointments, and call your doctor if you are having problems. It's also a good idea to know your test results and keep a list of the medicines you take.  Where can you learn more?  Go to https://www.[email or web address].net/patiented  Enter K451 in the search box to learn more about \"Learning About Fetal Ultrasound Results.\"  Current as of: July 10, 2023               Content Version: 14.0    6975-8683 [employer].   Care instructions adapted under license by your healthcare professional. If you have questions about a medical condition or this instruction, always ask your healthcare professional. [employer] disclaims any warranty or liability for your use of this information.      Learning About Prenatal Visits  Overview     Regular prenatal visits are very important during any pregnancy. These quick office visits may seem simple and routine. But they can help you have a safe and healthy pregnancy. Your doctor is watching for problems that can only be found through regular checkups. The visits also give you and your doctor time to build a good relationship.  After your first visit, you will most likely start on a schedule of monthly visits. In your third trimester, the visits will get more frequent. Based on your health, your age, and if you've had a normal, full-term pregnancy before, your doctor may want to see you more or less often.  At different times in your pregnancy, you will have exams and tests. Some are routine. Others are done only when there is a chance of a problem. Everything healthy " you do for your body helps you have a healthy pregnancy. Rest when you need it. Eat well, drink plenty of water, and exercise regularly.  What happens during a prenatal visit?  You will have blood pressure checks, along with urine tests. You also may have blood tests. If you need to go to the bathroom while waiting for the doctor, tell the nurse. You will be given a sample cup so your urine can be tested.  You will be weighed and have your belly measured.  Your doctor may listen to the fetal heartbeat with a special device.  At about 24 weeks, and possibly earlier in your pregnancy, your doctor will check your blood sugar (glucose tolerance test) for diabetes that can occur during pregnancy. This is gestational diabetes, which can be harmful.  You will have tests to check for infections that could harm your . These include group B streptococcus and hepatitis B.  Your doctor may do ultrasounds to check for problems. This also checks the position of the fetus. An ultrasound uses sound waves to produce a picture of the fetus.  You may get your vaccines updated.  Your doctor may ask you questions to check for signs of anxiety or depression. Tell your doctor if you feel sad, anxious, or hopeless for more than a few days.  You may have other tests at any time during your pregnancy.  Use your visits to discuss with your doctor any concerns you have.  How can you care for yourself at home?  Get plenty of rest.  Try to exercise every day, if your doctor says it is okay. If you have not exercised in the past, start out slowly. For example, you can take short walks each day.  Choose healthy foods, such as fruits, vegetables, whole grains, lean proteins, low-fat dairy, and healthy fats.  Drink plenty of fluids. Cut down on drinks with caffeine, such as coffee, tea, and cola. If you have kidney, heart, or liver disease and have to limit fluids, talk with your doctor before you increase the amount of fluids you drink.  Try  "to avoid chemical fumes, paint fumes, and poisons.  If you smoke, vape, or use alcohol, marijuana, or other drugs, quit or cut back as much as you can. Talk to your doctor if you need help quitting.  Review all of your medicines, including over-the-counter medicines and supplements, with your doctor. Some of your routine medicines may need to be changed. Do not stop or start taking any medicines without talking to your doctor first.  Follow-up care is a key part of your treatment and safety. Be sure to make and go to all appointments, and call your doctor if you are having problems. It's also a good idea to know your test results and keep a list of the medicines you take.  Where can you learn more?  Go to https://www.BringShare.net/patiented  Enter J502 in the search box to learn more about \"Learning About Prenatal Visits.\"  Current as of: July 10, 2023               Content Version: 14.0    0552-0125 [employer].   Care instructions adapted under license by your healthcare professional. If you have questions about a medical condition or this instruction, always ask your healthcare professional. [employer] disclaims any warranty or liability for your use of this information.      Intimate Partner Violence: Care Instructions  Overview     If you want to save this information but don't think it is safe to take it home, see if a trusted friend can keep it for you. Plan ahead. Know who you can call for help, and memorize the phone number.   Be careful online too. Your online activity may be seen by others. Do not use your personal computer or device to read about this topic. Use a safe computer, such as one at work, a friend's home, or a library.    Intimate partner violence--a type of domestic abuse--is different from an argument now and then. It is a pattern of abuse that one person may use to control another person's behavior. It may start with threats and name-calling. Then, it may lead to " more serious acts, like pushing and slapping. The abuse also may occur in other areas. For example, the abuser may withhold money or spend a partner's money without their knowledge.  Abuse can cause serious harm. You are more likely to have a long-term health problem from the injuries and stress of living in a violent relationship. People who are sexually abused by their partners have more sexually transmitted infections and unplanned pregnancies. Anyone who is abused also faces emotional pain. Anyone can be abused in relationships. In some relationships, both people use abusive behavior.  If you are pregnant, abuse can cause problems such as poor weight gain, infections, and bleeding. Abuse during this time may increase your baby's risk of low birth weight, premature birth, and death.  Follow-up care is a key part of your treatment and safety. Be sure to make and go to all appointments, and call your doctor if you are having problems. It's also a good idea to know your test results and keep a list of the medicines you take.  How can you care for yourself at home?  If you do not have a safe place to stay, discuss this with your doctor before you leave.  Have a plan for where to go, how to leave your home, and where to stay in case of an emergency. Do not tell your partner about your plan. Contact:  The National Domestic Violence Hotline toll-free at 1-147.937.9511. They can help you find resources in your area.  Your local police department, hospital, or clinic for information about shelters and safe homes near you.  Talk to a trusted friend or neighbor, a counselor, or a shaka leader. Do not feel that you have to hide what happened.  Teach your children how to call for help in an emergency.  Be alert to warning signs, such as threats, heavy alcohol use, or drug use. This can help you avoid danger.  If you can, make sure that there are no guns or other weapons in your home.  When should you call for help?   Call 356  "anytime you think you may need emergency care. For example, call if:    You or someone else has just been abused.     You think you or someone else is in danger of being abused.   Watch closely for changes in your health, and be sure to contact your doctor if you have any problems.  Where can you learn more?  Go to https://www.[employer].net/patiented  Enter G282 in the search box to learn more about \"Intimate Partner Violence: Care Instructions.\"  Current as of: June 24, 2023               Content Version: 14.0    8933-7849 [employer].   Care instructions adapted under license by your healthcare professional. If you have questions about a medical condition or this instruction, always ask your healthcare professional. [employer] disclaims any warranty or liability for your use of this information.      Intimate Partner Violence Safety Instructions: Care Instructions  Overview     If you want to save this information but don't think it is safe to take it home, see if a trusted friend can keep it for you. Plan ahead. Know who you can call for help, and memorize the phone number.   Be careful online too. Your online activity may be seen by others. Do not use your personal computer or device to read about this topic. Use a safe computer, such as one at work, a friend's home, or a library.    When you are abused by a spouse or partner, you can take actions to protect yourself and your children.  You can increase your safety whether you decide to stay with your spouse or partner or you decide to leave. You may want to make a safety plan and pack a bag ahead of time. This will help you leave quickly when you decide to. Remember, you cannot change your partner's actions, but you can find help for you and your children. No one deserves to be abused.  Follow-up care is a key part of your treatment and safety. Be sure to make and go to all appointments, and call your doctor if you are having " problems. It's also a good idea to know your test results and keep a list of the medicines you take.  How can you care for yourself at home?  Make a plan for your safety   If you decide to stay with your abusive spouse or partner, you can do the following to increase your safety:  Decide what works best to keep you safe in an emergency.  Know who you can call to help you in an emergency.  Decide if you will call the police if you get hurt again. If you can, agree on a signal with your children or neighbor to call the police for you if you need help. You can flash lights or hang something out of a window.  Choose a safe place to go for a short time if you need to leave home. Memorize the address and phone number.  Learn escape routes out of your home in case you need to leave in a hurry. Teach your children different ways to get out of your home quickly if they need to.  If you can, hide or lock up things that can be used as weapons (guns, knives, hammers).  Learn the number of a domestic violence shelter. Talk to the people there about how they can help.  Find out about other community resources that can help you.  Take pictures of bruises or other injuries if you can. You can also take pictures of things your abuser has broken.  Teach your children that violence is never okay. Tell them that they do not deserve to be hurt.  Pack a bag   Prepare a bag with things you will need if you leave suddenly. Leave it with a friend, a relative, or someone else you trust. You could include the following items in the bag:  A set of keys to your home and car.  Emergency phone numbers and addresses.  Money such as cash or checks. You can also ask a friend, a relative, or someone else you trust to hold money for you.  Copies of legal documents such as house and car titles or rent receipts, birth certificates, Social Security card, voter registration, marriage and 's licenses, and your children's health records.  Personal  "items you would need for a few days, such as clothes, a toothbrush, toothpaste, and any medicines you or your children need.  A favorite toy or book for your child or children.  Diapers and bottles, if you have very young children.  Pictures that show signs of abuse and violence. You may also add pictures of your abuser.  If you leave   If you decide to leave, you can take the following steps:  Go to the emergency room at a hospital if you have been hurt.  Think about asking the police to be with you as you leave. They can protect you as you leave your home.  If you decide to leave secretly, remember that activities can be tracked. Your abuser may still have access to your cell phone, email, and credit cards. It may be possible for these to be traced. Always be aware of your surroundings.  If this is an emergency, do not worry about gathering up anything. Just leave--your safety is most important.  If your abuser moves out, change the locks on the doors. If you have a security system, change the access code.  When should you call for help?   Call 911 anytime you think you may need emergency care. For example, call if:    You or someone else has just been abused.     You think you or someone else is in danger of being abused.   Watch closely for changes in your health, and be sure to contact your doctor if you have any problems.  Where can you learn more?  Go to https://www.eelusion.net/patiented  Enter A752 in the search box to learn more about \"Intimate Partner Violence Safety Instructions: Care Instructions.\"  Current as of: June 24, 2023               Content Version: 14.0    8292-7441 [employer].   Care instructions adapted under license by your healthcare professional. If you have questions about a medical condition or this instruction, always ask your healthcare professional. [employer] disclaims any warranty or liability for your use of this information.      Learning About " Intimate Partner Violence  What is intimate partner violence?  Intimate partner violence is a type of domestic abuse. It's threatening, emotionally harmful, or violent behavior in a personal relationship. It can happen between past or current partners or spouses. In some relationships both people abuse each other. One partner may be more abusive. Or the abuse may be equal.  Abuse can affect people of any ethnic group, race, or Confucianism. It can affect teens, adults, or the elderly. And it can happen to people of any sexual orientation, gender, or social status.  Abusers use fear, bullying, and threats to control their partners. They may control what their partners do. They may control where their partners go or who they see. They may act jealous, controlling, or possessive. These early signs of abuse may happen soon after the start of the relationship. Sometimes it can be hard to notice abuse at first. But after the relationship becomes more serious, the abuse may get worse.  If you are being abused in your relationship, it's important to get help. The abuse is not your fault. You don't have to face it alone.  Be careful  It may not be safe to take home domestic abuse information like this handout. Some people ask a trusted friend to keep it for them. It's also important to plan ahead and to memorize the phone number of places you can go for help. If you are concerned about your safety, do not use your computer, smartphone, or tablet to read about domestic abuse.   What are the types of intimate partner violence?  Abuse can happen in different ways. Each type can happen on its own or in combination with others.  Emotional abuse  Emotional abuse is a pattern of threats, insults, or controlling behavior. It includes verbal abuse. It goes beyond healthy disagreements in a relationship. It's a sign of an unhealthy relationship.  Do you feel threatened, intimidated, or controlled?  Does your partner:  Threaten your  children, other family members, or pets?  Use jokes meant to embarrass or shame you?  Call you names?  Tell you that you are a bad parent?  Threaten to take away your children?  Threaten to have you or your family members deported?  Control your access to money or other basic needs?  Control what you do, who you see or talk to, or where you go?  Another form of emotional abuse is denying that it is happening. Or the abuser may act like the abuse is no big deal or is your fault.  Sexual abuse  With sexual abuse, abusers may try to convince or force you to have sex. They may force you into sex acts you're not comfortable with. Or they may sexually assault you. Sexual abuse can happen even if you are in a committed relationship.  Physical abuse  Physical abuse means that a partner hits, kicks, or does something else to physically hurt you. Physical abuse that starts with a slap might lead to kicking, shoving, and choking over time. The abuser may also threaten to hurt or kill you.  Stalking  Stalking means that an abuser gives you attention that you do not want and that causes you fear. Examples of stalking include:  Following you.  Showing up at places where the abuser isn't invited, such as at your work or school.  Constantly calling or texting you.  What problems can  to?  Intimate partner violence can be very dangerous. It can cause serious, repeated injury. It can even lead to death.  All forms of abuse can cause long-term health problems from the stress of a violent relationship. Verbal abuse can lead to sexual and physical abuse.  Abuse causes:  Emotional pain.  Depression.  Anxiety.  Post-traumatic stress.  Sexual abuse can lead to sexually transmitted infections (such as HIV/AIDS) and unplanned pregnancy.  Pregnancy can be a very dangerous time for people in abusive relationships. Abuse can cause or increase the risk of problems during pregnancy. These include low weight gain, anemia, infections, and  "bleeding. Abuse may also increase your baby's risk of low birth weight, premature birth, and death.  It can be hard for some victims of abuse to ask for help or to leave their relationship. You may feel scared, stuck, or not sure what steps to take. But it's important not to ignore abuse. Talking to someone you trust could be the first step to ending the abuse and taking care of your own health and happiness again. There are resources available that can help keep you safe.  Where can you get help?  Talk to a trusted friend. Find a local advocacy group, or talk to your doctor about the abuse.  Contact the National Domestic Violence Hotline at 3-211-917-XFYE (1-230.454.3849) for more safety tips. They can guide you to groups in your area that can help. Or go to the National Coalition Against Domestic Violence website at www.thehotlFesticket.org to learn more.  Domestic violence groups or a counselor in your area can help you make a safety plan for yourself and your children.  When to call for help  Call 911 anytime you think you may need emergency care. For example, call if:  You think that you or someone you know is in danger of being abused.  You have been hurt and can't have someone safely take you to emergency care.  You have just been abused.  A family member has just been abused.  Where can you learn more?  Go to https://www.Amplify.LA.net/patiented  Enter S665 in the search box to learn more about \"Learning About Intimate Partner Violence.\"  Current as of: June 24, 2023               Content Version: 14.0    0241-9395 [employer].   Care instructions adapted under license by your healthcare professional. If you have questions about a medical condition or this instruction, always ask your healthcare professional. [employer] disclaims any warranty or liability for your use of this information.      Vaginal Bleeding During Pregnancy: Care Instructions  Overview     It's common to have some " vaginal spotting when you are pregnant. In some cases, the bleeding isn't serious. And there aren't any more problems with the pregnancy.  But sometimes bleeding is a sign of a more serious problem. This is more common if the bleeding is heavy or painful. Examples of more serious problems include miscarriage, an ectopic pregnancy, and a problem with the placenta.  You may have to see your doctor again to be sure everything is okay. You may also need more tests to find the cause of the bleeding.  Home treatment may be all you need. But it depends on what is causing the bleeding. Be sure to tell your doctor if you have any new symptoms or if your symptoms get worse.  The doctor has checked you carefully, but problems can develop later. If you notice any problems or new symptoms, get medical treatment right away.  Follow-up care is a key part of your treatment and safety. Be sure to make and go to all appointments, and call your doctor if you are having problems. It's also a good idea to know your test results and keep a list of the medicines you take.  How can you care for yourself at home?  If your doctor prescribed medicines, take them exactly as directed. Call your doctor if you think you are having a problem with your medicine.  Do not have vaginal sex until your doctor says it's okay.  Do not put anything in your vagina until your doctor says it's okay.  Ask your doctor about other activities you can or can't do.  Get a lot of rest. Being pregnant can make you tired.  Do not use nonsteroidal anti-inflammatory drugs (NSAIDs), such as ibuprofen (Advil, Motrin), naproxen (Aleve), or aspirin, unless your doctor says it is okay.  When should you call for help?   Call 911 anytime you think you may need emergency care. For example, call if:    You passed out (lost consciousness).     You have severe vaginal bleeding. This means you are soaking through a pad each hour for 2 or more hours.     You have sudden, severe pain  "in your belly or pelvis.   Call your doctor now or seek immediate medical care if:    You have new or worse vaginal bleeding.     You are dizzy or lightheaded, or you feel like you may faint.     You have pain in your belly, pelvis, or lower back.     You think that you are in labor.     You have a sudden release of fluid from your vagina.     You've been having regular contractions for an hour. This means that you've had at least 8 contractions within 1 hour or at least 4 contractions within 20 minutes, even after you change your position and drink fluids.     You notice that your baby has stopped moving or is moving much less than normal.   Watch closely for changes in your health, and be sure to contact your doctor if you have any problems.  Where can you learn more?  Go to https://www.Service Route.net/patiented  Enter N829 in the search box to learn more about \"Vaginal Bleeding During Pregnancy: Care Instructions.\"  Current as of: July 10, 2023               Content Version: 14.0    5092-6139 [employer].   Care instructions adapted under license by your healthcare professional. If you have questions about a medical condition or this instruction, always ask your healthcare professional. [employer] disclaims any warranty or liability for your use of this information.      Learning About Pregnancy  Your Care Instructions     Your health in the early weeks of your pregnancy is particularly important for your baby's health. Take good care of yourself. Anything you do that harms your body can also harm your baby.  Make sure to go to all of your doctor appointments. Regular checkups will help keep you and your baby healthy.  How can you care for yourself at home?  Diet    Choose healthy foods like fruits, vegetables, whole grains, lean proteins, and healthy fats.     Choose foods that are good sources of calcium, iron, and folate. You can try dairy products, dark leafy greens, fortified orange " juice and cereals, almonds, broccoli, dried fruit, and beans.     Do not skip meals or go for many hours without eating. If you are nauseated, try to eat a small, healthy snack every 2 to 3 hours.     Avoid fish that are high in mercury. These include shark, swordfish, tom mackerel, marlin, orange roughy, and bigeye tuna, as well as tilefish from the [facility].     It's okay to eat up to 8 to 12 ounces a week of fish that are low in mercury or up to 4 ounces a week of fish that have medium levels of mercury. Some fish that are low in mercury are salmon, shrimp, canned light tuna, cod, and tilapia. Some fish that have medium levels of mercury are halibut and white albacore tuna.     Drink plenty of fluids. If you have kidney, heart, or liver disease and have to limit fluids, talk with your doctor before you increase the amount of fluids you drink.     Limit caffeine to about 200 to 300 mg per day. On average, a cup of brewed coffee has around 80 to 100 mg of caffeine.     Do not drink alcohol, such as beer, wine, or hard liquor.     Take a multivitamin that contains at least 400 micrograms (mcg) of folic acid to help prevent birth defects. Fortified cereal and whole wheat bread are good additional sources of folic acid.     Increase the calcium in your diet. Try to drink a quart of skim milk each day. You may also take calcium supplements and choose foods such as cheese and yogurt.   Lifestyle    Make sure you go to your follow-up appointments.     Get plenty of rest. You may be unusually tired while you are pregnant.     Get at least 30 minutes of exercise on most days of the week. Walking is a good choice. If you have not exercised in the past, start out slowly. Take several short walks each day.     Do not smoke. If you need help quitting, talk to your doctor about stop-smoking programs. These can increase your chances of quitting for good.     Do not touch cat feces or litter boxes. Also, wash your hands  after you handle raw meat, and fully cook all meat before you eat it. Wear gloves when you work in the yard or garden, and wash your hands well when you are done. Cat feces, raw or undercooked meat, and contaminated dirt can cause an infection that may harm your baby or lead to a miscarriage.     Avoid things that can make your body too hot and may be harmful to your baby, such as a hot tub or sauna. Or talk with your doctor before doing anything that raises your body temperature. Your doctor can tell you if it's safe.     Avoid chemical fumes, paint fumes, or poisons.     Do not use illegal drugs, marijuana, or alcohol.   Medicines    Review all of your medicines with your doctor. Some of your routine medicines may need to be changed to protect your baby.     Use acetaminophen (Tylenol) to relieve minor problems, such as a mild headache or backache or a mild fever with cold symptoms. Do not use nonsteroidal anti-inflammatory drugs (NSAIDs), such as ibuprofen (Advil, Motrin) or naproxen (Aleve), unless your doctor says it is okay.     Do not take two or more pain medicines at the same time unless the doctor told you to. Many pain medicines have acetaminophen, which is Tylenol. Too much acetaminophen (Tylenol) can be harmful.     Take your medicines exactly as prescribed. Call your doctor if you think you are having a problem with your medicine.   To manage morning sickness    Keep food in your stomach, but not too much at once. Try eating five or six small meals a day instead of three large meals.     For nausea when you wake up, eat a small snack, such as a couple of crackers or pretzels, before rising. Allow a few minutes for your stomach to settle before you slowly get up.     Try to avoid smells and foods that make you feel nauseated. High-fat or greasy foods, milk, and coffee may make nausea worse. Some foods that may be easier to tolerate include cold, spicy, sour, and salty foods.     Drink enough fluids.  "Water and other caffeine-free drinks are good choices.     Take your prenatal vitamins at night on a full stomach.     Try foods and drinks made with shakila. Shakila may help with nausea.     Get lots of rest. Morning sickness may be worse when you are tired.     Talk to your doctor about over-the-counter products, such as vitamin B6 or doxylamine, to help relieve symptoms.     Try a P6 acupressure wrist band. These anti-nausea wristbands help some people.   Follow-up care is a key part of your treatment and safety. Be sure to make and go to all appointments, and call your doctor if you are having problems. It's also a good idea to know your test results and keep a list of the medicines you take.  Where can you learn more?  Go to https://www.MesM[employer]iaux.net/patiented  Enter E868 in the search box to learn more about \"Learning About Pregnancy.\"  Current as of: July 10, 2023               Content Version: 14.0    7810-3234 [employer].   Care instructions adapted under license by your healthcare professional. If you have questions about a medical condition or this instruction, always ask your healthcare professional. [employer] disclaims any warranty or liability for your use of this information.      Weeks 6 to 10 of Your Pregnancy: Care Instructions  During these weeks of pregnancy, your body goes through many changes. You may start to feel different, both in your body and your emotions. Each pregnancy is different, so there's no \"right\" way to feel. These early weeks are a time to make healthy choices for you and your pregnancy.    Take a daily prenatal vitamin. Choose one with folic acid in it.    Avoid alcohol, tobacco, and drugs (including marijuana). If you need help quitting, talk to your doctor.    Drink plenty of liquids.  Be sure to drink enough water. And limit sodas, other sweetened drinks, and caffeine.     Choose foods that are good sources of calcium, iron, and folate.  You can " "try dairy products, dark leafy greens, fortified orange juice and cereals, almonds, broccoli, dried fruit, and beans.     Avoid foods that may be harmful.  Don't eat raw meat, deli meat, raw seafood, or raw eggs. Avoid soft cheese and unpasteurized dairy, like Brie and blue cheese. And don't eat fish that contains a lot of mercury, like shark and swordfish.     Don't touch paty litter or cat poop.  They can cause an infection that could be harmful during pregnancy.     Avoid things that can make your body too hot.  For example, avoid hot tubs and saunas.     Soothe morning sickness.  Try eating 5 or 6 small meals a day, getting some fresh air, or using stacie to control symptoms.     Ask your doctor about flu and COVID-19 shots.  Getting them can help protect against infection.   Follow-up care is a key part of your treatment and safety. Be sure to make and go to all appointments, and call your doctor if you are having problems. It's also a good idea to know your test results and keep a list of the medicines you take.  Where can you learn more?  Go to https://www.[employer].net/patiented  Enter G112 in the search box to learn more about \"Weeks 6 to 10 of Your Pregnancy: Care Instructions.\"  Current as of: July 10, 2023               Content Version: 14.0    4432-5839 [employer].   Care instructions adapted under license by your healthcare professional. If you have questions about a medical condition or this instruction, always ask your healthcare professional. [employer] disclaims any warranty or liability for your use of this information.         Managing Morning Sickness (01:55)  Your health professional recommends that you watch this short online health video.  Learn tips for dealing with morning sickness, no matter what time of day you have it.  Purpose:  Gives tips for managing morning sickness, including eating small low-fat meals and avoiding caffeine and spicy food.  Goal:  The " user will learn tips for dealing with morning sickness during pregnancy.     How to watch the video    Scan the QR code   OR Visit the website    [email or web address]   Current as of: July 10, 2023               Content Version: 14.0    9962-8099 [employer].   Care instructions adapted under license by your healthcare professional. If you have questions about a medical condition or this instruction, always ask your healthcare professional. [employer] disclaims any warranty or liability for your use of this information.      Pregnancy and Heartburn: Care Instructions  Overview     Heartburn is a common problem during pregnancy.  Heartburn happens when stomach acid backs up into the tube that carries food to the stomach. This tube is called the esophagus. Early in pregnancy, heartburn is caused by hormone changes that slow down digestion. Later on, it's also caused by the large uterus pushing up on the stomach.  Even though you can't fix the cause, there are things you can do to get relief. Treating heartburn during pregnancy focuses first on making lifestyle changes, like changing what and how you eat, and on taking medicines.  Heartburn usually improves or goes away after childbirth.  Follow-up care is a key part of your treatment and safety. Be sure to make and go to all appointments, and call your doctor if you are having problems. It's also a good idea to know your test results and keep a list of the medicines you take.  How can you care for yourself at home?  Eat small, frequent meals.  Avoid foods that make your symptoms worse, such as chocolate, peppermint, and spicy foods. Avoid drinks with caffeine, such as coffee, tea, and sodas.  Avoid bending over or lying down after meals.  Take a short walk after you eat.  If heartburn is a problem at night, do not eat for 2 hours before bedtime.  Take antacids like Mylanta, Maalox, Rolaids, or Tums. Do not take antacids that have  "sodium bicarbonate, magnesium trisilicate, or aspirin. Be careful when you take over-the-counter antacid medicines. Many of these medicines have aspirin in them. While you are pregnant, do not take aspirin or medicines that contain aspirin unless your doctor says it is okay.  If you're not getting relief, talk to your doctor. You may be able to take a stronger acid-reducing medicine.  When should you call for help?   Call your doctor now or seek immediate medical care if:    You have new or worse belly pain.     You are vomiting.   Watch closely for changes in your health, and be sure to contact your doctor if:    You have new or worse symptoms of reflux.     You are losing weight.     You have trouble or pain swallowing.     You do not get better as expected.   Where can you learn more?  Go to https://www.Vouchr.net/patiented  Enter U946 in the search box to learn more about \"Pregnancy and Heartburn: Care Instructions.\"  Current as of: July 10, 2023               Content Version: 14.0    2100-8709 to be.   Care instructions adapted under license by your healthcare professional. If you have questions about a medical condition or this instruction, always ask your healthcare professional. to be disclaims any warranty or liability for your use of this information.      Constipation: Care Instructions  Overview     Constipation means that you have a hard time passing stools (bowel movements). People pass stools from 3 times a day to once every 3 days. What is normal for you may be different. Constipation may occur with pain in the rectum and cramping. The pain may get worse when you try to pass stools. Sometimes there are small amounts of bright red blood on toilet paper or the surface of stools. This is because of enlarged veins near the rectum (hemorrhoids).  A few changes in your diet and lifestyle may help you avoid ongoing constipation. Your doctor may also prescribe " medicine to help loosen your stool.  Some medicines can cause constipation. These include pain medicines and antidepressants. Tell your doctor about all the medicines you take. Your doctor may want to make a medicine change to ease your symptoms.  Follow-up care is a key part of your treatment and safety. Be sure to make and go to all appointments, and call your doctor if you are having problems. It's also a good idea to know your test results and keep a list of the medicines you take.  How can you care for yourself at home?  Drink plenty of fluids. If you have kidney, heart, or liver disease and have to limit fluids, talk with your doctor before you increase the amount of fluids you drink.  Include high-fiber foods in your diet each day. These include fruits, vegetables, beans, and whole grains.  Get at least 30 minutes of exercise on most days of the week. Walking is a good choice. You also may want to do other activities, such as running, swimming, cycling, or playing tennis or team sports.  Take a fiber supplement, such as Citrucel or Metamucil, every day. Read and follow all instructions on the label.  Schedule time each day for a bowel movement. A daily routine may help. Take your time having a bowel movement, but don't sit for more than 10 minutes at a time. And don't strain too much.  Support your feet with a small step stool when you sit on the toilet. This helps flex your hips and places your pelvis in a squatting position.  Your doctor may recommend an over-the-counter laxative to relieve your constipation. Examples are Milk of Magnesia and MiraLax. Read and follow all instructions on the label. Do not use laxatives on a long-term basis.  When should you call for help?   Call your doctor now or seek immediate medical care if:    You have new or worse belly pain.     You have new or worse nausea or vomiting.     You have blood in your stools.   Watch closely for changes in your health, and be sure to  "contact your doctor if:    Your constipation is getting worse.     You do not get better as expected.   Where can you learn more?  Go to https://www.healthOlogy [email or web address]/patiented  Enter P343 in the search box to learn more about \"Constipation: Care Instructions.\"  Current as of: October 19, 2023               Content Version: 14.0    3992-2390 [employer].   Care instructions adapted under license by your healthcare professional. If you have questions about a medical condition or this instruction, always ask your healthcare professional. [employer] disclaims any warranty or liability for your use of this information.      Learning About High-Iron Foods  What foods are high in iron?     The foods you eat contain nutrients, such as vitamins and minerals. Iron is a nutrient. Your body needs the right amount to stay healthy and work as it should. You can use the list below to help you make choices about which foods to eat.  Here are some foods that contain iron. They have 1 to 2 milligrams of iron per serving.  Fruits  Figs (dried), 5 figs  Vegetables  Asparagus (canned), 6 haines  Iliana, beet, Swiss chard, or turnip greens, 1 cup  Dried peas, cooked,   cup  Seaweed, spirulina (dried),   cup  Spinach, (cooked)   cup or (raw) 1 cup  Grains  Cereals, fortified with iron, 1 cup  Grits (instant, cooked), fortified with iron,   cup  Meats and other protein foods  Beans (kidney, lima, navy, white), canned or cooked,   cup  Beef or lamb, 3 oz  Chicken giblets, 3 oz  Chickpeas (garbanzo beans),   cup  Liver of beef, lamb, or pork, 3 oz  Oysters (cooked), 3 oz  Sardines (canned), 3 oz  Soybeans (boiled),   cup  Tofu (firm),   cup  Work with your doctor to find out how much of this nutrient you need. Depending on your health, you may need more or less of it in your diet.  Where can you learn more?  Go to https://www.JumpMusic.net/patiented  Enter R005 in the search box to learn more about \"Learning About " "High-Iron Foods.\"  Current as of: September 20, 2023               Content Version: 14.0    4798-6611 [employer].   Care instructions adapted under license by your healthcare professional. If you have questions about a medical condition or this instruction, always ask your healthcare professional. [employer] disclaims any warranty or liability for your use of this information.      Rh Antibodies Screening During Pregnancy: About This Test  What is it?     The Rh antibodies screening test is a blood test. It checks your blood for Rh antibodies. If you have Rh-negative blood and have been exposed to Rh-positive blood, your immune system may make antibodies to attack the Rh-positive blood. When a pregnant woman has these antibodies, it is called Rh sensitization.  Why is this test done?  The Rh antibodies screening test is done during pregnancy to find out if your baby is at risk for Rh disease. This can happen if you have Rh-negative blood and your baby has Rh-positive blood. If your Rh-negative blood mixes with Rh-positive blood, your immune system will make antibodies to attack the Rh-positive blood.  During pregnancy, these antibodies could attach to the baby's red blood cells. This can cause your baby to have serious health problems. The results of this test will help your doctor know how to best care for you and your baby during your pregnancy.  How do you prepare for the test?  In general, there's nothing you have to do before this test, unless your doctor tells you to.  How is the test done?  A health professional uses a needle to take a blood sample, usually from the arm.  What happens after the test?  You will probably be able to go home right away. It depends on the reason for the test.  You can go back to your usual activities right away.  Follow-up care is a key part of your treatment and safety. Be sure to make and go to all appointments, and call your doctor if you are having " "problems. It's also a good idea to keep a list of the medicines you take. Ask your doctor when you can expect to have your test results.  Where can you learn more?  Go to https://www.[email or web address] Water.net/patiented  Enter P722 in the search box to learn more about \"Rh Antibodies Screening During Pregnancy: About This Test.\"  Current as of: July 10, 2023               Content Version: 14.0    3853-1285 [email or web address].   Care instructions adapted under license by your healthcare professional. If you have questions about a medical condition or this instruction, always ask your healthcare professional. [email or web address] disclaims any warranty or liability for your use of this information.      Learning About Preventing Rh Disease  What is Rh disease?     Rh disease can be a serious problem in pregnancy. It happens when substances called antibodies in the mother's blood cause red blood cells in her baby's blood to be destroyed. This can occur when the blood types of a mother and her baby do not match.  All blood has an Rh factor. This is what makes a blood type positive or negative. When you are Rh-negative, your baby may be Rh-negative or Rh-positive. If your baby has Rh-positive blood and it mixes with yours, your body will make antibodies. This is called Rh sensitization.  Most of the time, this is not a problem in a first pregnancy. But in future pregnancies, it could cause Rh disease.  A  with Rh disease has mild anemia and may have jaundice. In severe cases, anemia, jaundice, and swelling can be very dangerous or fatal. Some babies need to be delivered early. Some need special care in the NICU. A very sick baby will need a blood transfusion before or after birth.  Fortunately, Rh sensitization is usually easy to prevent.  That's why it's important to get your Rh status checked in your first trimester. It doesn't cause any warning signs. A blood test is the only way to know if you are Rh-sensitive " "or are at risk for it.  How can you prevent Rh disease?  If you are Rh-negative, your doctor gives you an Rh immune globulin shot (such as RhoGAM). It helps prevent your body from making the antibodies that attack your baby's red blood cells.  Timing is important. You need the shot at certain times during your pregnancy. And you need one anytime there is a chance that your baby's blood might mix with yours. That can happen with certain prenatal tests or when you have pregnancy bleeding, such as:  Right after any pregnancy loss, amniocentesis, or CVS testing.  After turning of a breech baby.  Before and maybe after childbirth. Your doctor gives you a shot around week 28. If your  is Rh-positive, you will have another shot.  Follow-up care is a key part of your treatment and safety. Be sure to make and go to all appointments, and call your doctor if you are having problems. It's also a good idea to know your test results and keep a list of the medicines you take.  Where can you learn more?  Go to https://www.CiiNOW.net/patiented  Enter W177 in the search box to learn more about \"Learning About Preventing Rh Disease.\"  Current as of: July 10, 2023               Content Version: 14.0    7768-6632 [employer].   Care instructions adapted under license by your healthcare professional. If you have questions about a medical condition or this instruction, always ask your healthcare professional. [employer] disclaims any warranty or liability for your use of this information.      Learning About Rh Immunoglobulin Shots  Introduction     An Rh immunoglobulin shot is given to pregnant women who have Rh-negative blood.  You may have Rh-negative blood, and your baby may have Rh-positive blood. If the two types of blood mix, your body will make antibodies. This is called Rh sensitization. Most of the time, this is not a problem the first time you're pregnant. But it could cause problems in " "future pregnancies.  This shot keeps your body from making the antibodies. You get the shot around 28 weeks of pregnancy. After the birth, your baby's blood is tested. If the blood is Rh positive, you will get another shot. You may also get the shot if you have vaginal bleeding while you are pregnant or if you have a miscarriage. These shots protect future pregnancies.  Women with Rh negative blood will need this shot each time they get pregnant.  Example  Rh immunoglobulin (HypRho-D, MICRhoGAM, and RhoGAM)  Possible side effects  Rare side effects may include:  Some mild pain where you got the shot.  A slight fever.  An allergic reaction.  You may have other side effects not listed here. Check the information that comes with your medicine.  What to know about taking this medicine  You may need more than one shot. You may need the shot again:  After amniocentesis, fetal blood sampling, or chorionic villus sampling tests.  If you have bleeding in your second or third trimester.  After turning of a breech baby.  After an injury to the belly while you are pregnant.  After a miscarriage or an .  Before or right after treatment for an ectopic or a partial molar pregnancy.  Tell your doctor if you have any allergies or have had a bad response to medicines in the past.  If you get this shot within 3 months of getting a live-virus vaccine, the vaccine may not work. Your doctor will tell you if you need more vaccine.  Check with your doctor or pharmacist before you use any other medicines. This includes over-the-counter medicines. Make sure your doctor knows all of the medicines, vitamins, herbs, and supplements you take. Taking some medicines at the same time can cause problems.  Where can you learn more?  Go to https://www.healthwise.net/patiented  Enter V615 in the search box to learn more about \"Learning About Rh Immunoglobulin Shots.\"  Current as of: July 10, 2023               Content Version: 14.0    7334-0769 " [employer].   Care instructions adapted under license by your healthcare professional. If you have questions about a medical condition or this instruction, always ask your healthcare professional. [employer] disclaims any warranty or liability for your use of this information.      Rubella (Monegasque Measles): Care Instructions  Overview  Rubella, also called Monegasque measles or 3-day measles, is a disease caused by a virus. It spreads by coughs, sneezes, and close contact. Rubella usually is mild and does not cause long-term problems. But if you are pregnant and get it, you can give the disease to your unborn baby. This can cause serious birth defects.  While you have rubella, you may get a rash and a mild fever, and the lymph glands in your neck may swell. Older children often have a fever, eye pain, a sore throat, and body aches. You can relieve most symptoms with care at home. Avoid being around others, especially pregnant people, until your rash has been gone for at least 4 days. People who have not had this disease before or have not had the vaccine have the greatest chance of getting the virus.  Follow-up care is a key part of your treatment and safety. Be sure to make and go to all appointments, and call your doctor if you are having problems. It's also a good idea to know your test results and keep a list of the medicines you take.  How can you care for yourself at home?  Drink plenty of fluids. If you have kidney, heart, or liver disease and have to limit fluids, talk with your doctor before you increase the amount of fluids you drink.  Get plenty of rest to help your body heal.  Take an over-the-counter pain medicine, such as acetaminophen (Tylenol), ibuprofen (Advil, Motrin), or naproxen (Aleve), to reduce fever and discomfort. Read and follow all instructions on the label. Do not give aspirin to anyone younger than 20. It has been linked to Reye syndrome, a serious illness.  Do  "not take two or more pain medicines at the same time unless the doctor told you to. Many pain medicines have acetaminophen, which is Tylenol. Too much acetaminophen (Tylenol) can be harmful.  Try not to scratch the rash. Put cold, wet cloths on the rash to reduce itching.  Do not smoke. Smoking can make your symptoms worse. If you need help quitting, talk to your doctor about stop-smoking programs and medicines. These can increase your chances of quitting for good.  Avoid contact with people who have never had rubella and who have not been immunized.  When should you call for help?   Call your doctor now or seek immediate medical care if:    You have a fever with a stiff neck or a severe headache.     You are sensitive to light or feel very sleepy or confused.   Watch closely for changes in your health, and be sure to contact your doctor if:    You do not get better as expected.   Where can you learn more?  Go to https://www.Gremln.MValve technologies/patiented  Enter B812 in the search box to learn more about \"Rubella (Turkmen Measles): Care Instructions.\"  Current as of: June 12, 2023               Content Version: 14.0    7786-6876 [employer].   Care instructions adapted under license by your healthcare professional. If you have questions about a medical condition or this instruction, always ask your healthcare professional. [employer] disclaims any warranty or liability for your use of this information.      Gonorrhea and Chlamydia: About These Tests  What is it?  These tests use a sample of urine or other body fluid to look for the bacteria that cause these sexually transmitted infections (STIs). The fluid sample can come from the cervix, vagina, rectum, throat, or eyes.  Why is this test done?  These tests may be done to:  Find out if symptoms are caused by gonorrhea or chlamydia.  Check people who are at high risk of being infected with gonorrhea or chlamydia.  Retest people several months " "after they have been treated for gonorrhea or chlamydia.  Check for infection in your  if you had a gonorrhea or chlamydia infection at the time of delivery.  How can you prepare for the test?  If you are going to have a urine test, do not urinate for at least 1 hour before the test.  If you think you may have chlamydia or gonorrhea, don't have sexual intercourse until you get your test results. And you may want to have tests for other STIs, such as HIV.  How is the test done?  For a direct sample, a swab is used to collect body fluid from the cervix, vagina, rectum, throat, or eyes. Your doctor may collect the sample. Or you may be given instructions on how to collect your own sample.  For a urine sample, you will collect the urine that comes out when you first start to urinate. Don't wipe the genital area clean before you urinate.  How long does the test take?  The test will take a few minutes.  What happens after the test?  You will be able to go home right away.  You can go back to your usual activities right away.  If you do have an infection, don't have sexual intercourse for 7 days after you start treatment. And your sex partner(s) should also be treated.  Follow-up care is a key part of your treatment and safety. Be sure to make and go to all appointments, and call your doctor if you are having problems. It's also a good idea to keep a list of the medicines you take. Ask your doctor when you can expect to have your test results.  Where can you learn more?  Go to https://www.Vir2us.net/patiented  Enter K976 in the search box to learn more about \"Gonorrhea and Chlamydia: About These Tests.\"  Current as of: 2023               Content Version: 14.0    1257-9333 [employer].   Care instructions adapted under license by your healthcare professional. If you have questions about a medical condition or this instruction, always ask your healthcare professional. [employer], " [employer] disclaims any warranty or liability for your use of this information.      Trichomoniasis: About This Test  What is it?     This test uses a sample of urine or other body fluid to look for the tiny parasite that causes trichomoniasis (also called trich). The fluid sample can come from the vagina, cervix, or urethra. Your doctor may choose to use one or more of many available tests.  Why is it done?  A trich test may be done to:  Find out if symptoms are caused by trich.  Check people who are at high risk for being infected with trich.  Check after treatment to make sure that the infection is gone.  How do you prepare for the test?  If you are going to have a urine test, do not urinate for at least 1 hour before the test.  How is the test done?  For a direct sample, a swab is used to collect body fluid from the cervix, vagina, or urethra. Your doctor may collect the sample. Or you may be given instructions on how to collect your own sample.  For a urine sample, you will collect the urine that comes out when you first start to urinate. Don't wipe the area clean before you urinate.  How long does the test take?  It will take a few minutes to collect a sample.  What happens after the test?  You can go home right away.  You can go back to your usual activities right away.  You may get the test results the same day or several days later. It depends on the test used.  If you do have an infection, don't have sexual intercourse for 7 days after you start treatment. Your sex partner or partners should also be treated.  Follow-up care is a key part of your treatment and safety. Be sure to make and go to all appointments, and call your doctor if you are having problems. Ask your doctor when you can expect to have your test results.  Current as of: November 27, 2023               Content Version: 14.0    7641-0219 [employer].   Care instructions adapted under license by your healthcare professional. If  "you have questions about a medical condition or this instruction, always ask your healthcare professional. [employer] disclaims any warranty or liability for your use of this information.      HIV Testing: Care Instructions  Overview  You can get tested for the human immunodeficiency virus (HIV). Most doctors use a blood test to check for HIV antibodies and antigens in your blood. It may also check for the genetic material (RNA) of HIV. Some tests use saliva to check for HIV antibodies. But these aren't as accurate. For example, they may give a false result if you've just been infected.  What do the results mean?    Normal (negative)    No HIV antibodies, antigens, or RNA were found.  You may need more testing. It can make sure your test results are correct.    Uncertain (indeterminate)    Test results didn't clearly show if you have an HIV infection.  HIV antibodies or antigens may not have formed yet.  Some other type of antibody or antigen may have affected the results.  You will need another test to be sure.    Abnormal (positive)    HIV antibodies, antigens, or RNA were found.  If you haven't had an RNA test yet, one will be done. If it's positive, you have HIV.  If your test result is positive, your doctor will talk to you. You will discuss starting treatment.  Follow-up care is a key part of your treatment and safety. Be sure to make and go to all appointments, and call your doctor if you are having problems. It's also a good idea to know your test results and keep a list of the medicines you take.  Where can you learn more?  Go to https://www.Southern Dreams.net/patiented  Enter T792 in the search box to learn more about \"HIV Testing: Care Instructions.\"  Current as of: June 12, 2023               Content Version: 14.0    5814-1089 [employer].   Care instructions adapted under license by your healthcare professional. If you have questions about a medical condition or this instruction, " "always ask your healthcare professional. [employer] disclaims any warranty or liability for your use of this information.      Hepatitis C Virus Tests: About These Tests  What are they?     Hepatitis C virus tests are blood tests that check for substances in the blood that show whether you have hepatitis C now or had it in the past. The tests can also tell you what type of hepatitis C you have and how severe the disease is. This can help your doctor with treatment.  If the tests show that you have long-term hepatitis C, you need to take steps to prevent spreading the disease.  Why are these tests done?  You may need these tests if:  You have symptoms of hepatitis.  You may have been exposed to the virus. You are more likely to have been exposed to the virus if you inject drugs or are exposed to body fluids (such as if you are a health care worker).  You've had other tests that show you have liver problems.  You are 18 to 79 years old.  You have an HIV infection.  The tests also are done to help your doctor decide about your treatment and see how well it works.  How do you prepare for the test?  In general, there's nothing you have to do before this test, unless your doctor tells you to.  How is the test done?  A health professional uses a needle to take a blood sample, usually from the arm.  What happens after these tests?  You will probably be able to go home right away.  You can go back to your usual activities right away.  Follow-up care is a key part of your treatment and safety. Be sure to make and go to all appointments, and call your doctor if you are having problems. It's also a good idea to keep a list of the medicines you take. Ask your doctor when you can expect to have your test results.  Where can you learn more?  Go to https://www.[employer].net/patiented  Enter W551 in the search box to learn more about \"Hepatitis C Virus Tests: About These Tests.\"  Current as of: June 12, " 2023               Content Version: 14.0    4402-6288 [employer].   Care instructions adapted under license by your healthcare professional. If you have questions about a medical condition or this instruction, always ask your healthcare professional. [employer] disclaims any warranty or liability for your use of this information.      Learning About Fetal Ultrasound Results  What is a fetal ultrasound?     Fetal ultrasound is a test that lets your doctor see an image of your baby. Your doctor learns information about your baby from this picture. You may find out, for example, if you are having a boy or a girl. But the main reason you have this test is to get information about your baby's health.  (You may hear your baby called a fetus. This is a common medical term for a baby that's growing in the mother's uterus.)  What kind of information can you learn from this test?  The findings of an ultrasound fall into two categories, normal and abnormal.  Normal  The fetus is the right size for its age.  The placenta is the expected size and does not cover the cervix.  There is enough amniotic fluid in the uterus.  No birth defects can be seen.  Abnormal  The fetus is small or large for its age.  The placenta covers the cervix.  There is too much or too little amniotic fluid in the uterus.  The fetus may have a birth defect.  What does an abnormal result mean?  Abnormal seems to imply that something is wrong with your baby. But what it means is that the test has shown something the doctor wants to take a closer look at.  And that's what happens next. Your doctor will talk to you about what further test or tests you may need.  What do the results mean?  Some of the things your doctor may see on an abnormal ultrasound include:  Echogenic bowel.  The bowel looks very bright on the screen. This could mean that there's blood in the bowel. Or it could mean that something is blocking the small  bowel.  Increased nuchal translucency.  The ultrasound measures the thickness at the back of the baby's neck. An increase in thickness is sometimes an early sign of Down syndrome.  Increased or decreased amniotic fluid.  The doctor will look for a reason for the level of amniotic fluid and will watch the pregnancy closely as it progresses.  Large ventricles.  Ventricles in the brain look larger than they should. Your doctor may take a closer look at the brain.  Renal pyelectasis/hydronephrosis.  The ultrasound measures the fluid around the kidney. If there is more fluid than expected, there is a chance of urinary tract or kidney problems.  Short long bones.  The ultrasound measures certain arm and leg bones. A long bone (humerus or femur) that is shorter than average could be a sign of Down syndrome.  Subchorionic hemorrhage.  An ultrasound can show bleeding under one of the membranes that surrounds the fetus. Some women don't have symptoms of bleeding. The ultrasound can find this problem when women are not bleeding from their vagina. Women who have this condition have a slightly higher chance of miscarriage.  What do you do now?  Take a deep breath, and let it out. Keep in mind that an abnormal finding on an ultrasound, after it's coupled with more information, may:  Turn out to be nothing.  Turn out to be something mild that won't affect the baby.  Turn out to be something more serious. But if this happens, early diagnosis helps you and your doctor plan treatment options sooner rather than later.  Your medical team is there for you. So are your family and friends. Ask questions, and get the help and support you need.  Follow-up care is a key part of your treatment and safety. Be sure to make and go to all appointments, and call your doctor if you are having problems. It's also a good idea to know your test results and keep a list of the medicines you take.  Where can you learn more?  Go to  "https://www.[employer].net/patiented  Enter K451 in the search box to learn more about \"Learning About Fetal Ultrasound Results.\"  Current as of: July 10, 2023               Content Version: 14.0    4486-4992 [employer].   Care instructions adapted under license by your healthcare professional. If you have questions about a medical condition or this instruction, always ask your healthcare professional. [employer] disclaims any warranty or liability for your use of this information.      Learning About Prenatal Visits  Overview     Regular prenatal visits are very important during any pregnancy. These quick office visits may seem simple and routine. But they can help you have a safe and healthy pregnancy. Your doctor is watching for problems that can only be found through regular checkups. The visits also give you and your doctor time to build a good relationship.  After your first visit, you will most likely start on a schedule of monthly visits. In your third trimester, the visits will get more frequent. Based on your health, your age, and if you've had a normal, full-term pregnancy before, your doctor may want to see you more or less often.  At different times in your pregnancy, you will have exams and tests. Some are routine. Others are done only when there is a chance of a problem. Everything healthy you do for your body helps you have a healthy pregnancy. Rest when you need it. Eat well, drink plenty of water, and exercise regularly.  What happens during a prenatal visit?  You will have blood pressure checks, along with urine tests. You also may have blood tests. If you need to go to the bathroom while waiting for the doctor, tell the nurse. You will be given a sample cup so your urine can be tested.  You will be weighed and have your belly measured.  Your doctor may listen to the fetal heartbeat with a special device.  At about 24 weeks, and possibly earlier in your pregnancy, your " doctor will check your blood sugar (glucose tolerance test) for diabetes that can occur during pregnancy. This is gestational diabetes, which can be harmful.  You will have tests to check for infections that could harm your . These include group B streptococcus and hepatitis B.  Your doctor may do ultrasounds to check for problems. This also checks the position of the fetus. An ultrasound uses sound waves to produce a picture of the fetus.  You may get your vaccines updated.  Your doctor may ask you questions to check for signs of anxiety or depression. Tell your doctor if you feel sad, anxious, or hopeless for more than a few days.  You may have other tests at any time during your pregnancy.  Use your visits to discuss with your doctor any concerns you have.  How can you care for yourself at home?  Get plenty of rest.  Try to exercise every day, if your doctor says it is okay. If you have not exercised in the past, start out slowly. For example, you can take short walks each day.  Choose healthy foods, such as fruits, vegetables, whole grains, lean proteins, low-fat dairy, and healthy fats.  Drink plenty of fluids. Cut down on drinks with caffeine, such as coffee, tea, and cola. If you have kidney, heart, or liver disease and have to limit fluids, talk with your doctor before you increase the amount of fluids you drink.  Try to avoid chemical fumes, paint fumes, and poisons.  If you smoke, vape, or use alcohol, marijuana, or other drugs, quit or cut back as much as you can. Talk to your doctor if you need help quitting.  Review all of your medicines, including over-the-counter medicines and supplements, with your doctor. Some of your routine medicines may need to be changed. Do not stop or start taking any medicines without talking to your doctor first.  Follow-up care is a key part of your treatment and safety. Be sure to make and go to all appointments, and call your doctor if you are having problems. It's  "also a good idea to know your test results and keep a list of the medicines you take.  Where can you learn more?  Go to https://www.[employer].net/patiented  Enter J502 in the search box to learn more about \"Learning About Prenatal Visits.\"  Current as of: July 10, 2023               Content Version: 14.0    7648-6618 [email or web address].   Care instructions adapted under license by your healthcare professional. If you have questions about a medical condition or this instruction, always ask your healthcare professional. [email or web address] disclaims any warranty or liability for your use of this information.      Intimate Partner Violence: Care Instructions  Overview     If you want to save this information but don't think it is safe to take it home, see if a trusted friend can keep it for you. Plan ahead. Know who you can call for help, and memorize the phone number.   Be careful online too. Your online activity may be seen by others. Do not use your personal computer or device to read about this topic. Use a safe computer, such as one at work, a friend's home, or a library.    Intimate partner violence--a type of domestic abuse--is different from an argument now and then. It is a pattern of abuse that one person may use to control another person's behavior. It may start with threats and name-calling. Then, it may lead to more serious acts, like pushing and slapping. The abuse also may occur in other areas. For example, the abuser may withhold money or spend a partner's money without their knowledge.  Abuse can cause serious harm. You are more likely to have a long-term health problem from the injuries and stress of living in a violent relationship. People who are sexually abused by their partners have more sexually transmitted infections and unplanned pregnancies. Anyone who is abused also faces emotional pain. Anyone can be abused in relationships. In some relationships, both people use abusive " "behavior.  If you are pregnant, abuse can cause problems such as poor weight gain, infections, and bleeding. Abuse during this time may increase your baby's risk of low birth weight, premature birth, and death.  Follow-up care is a key part of your treatment and safety. Be sure to make and go to all appointments, and call your doctor if you are having problems. It's also a good idea to know your test results and keep a list of the medicines you take.  How can you care for yourself at home?  If you do not have a safe place to stay, discuss this with your doctor before you leave.  Have a plan for where to go, how to leave your home, and where to stay in case of an emergency. Do not tell your partner about your plan. Contact:  The National Domestic Violence Hotline toll-free at 1-447.857.8040. They can help you find resources in your area.  Your local police department, hospital, or clinic for information about shelters and safe homes near you.  Talk to a trusted friend or neighbor, a counselor, or a shaka leader. Do not feel that you have to hide what happened.  Teach your children how to call for help in an emergency.  Be alert to warning signs, such as threats, heavy alcohol use, or drug use. This can help you avoid danger.  If you can, make sure that there are no guns or other weapons in your home.  When should you call for help?   Call 911 anytime you think you may need emergency care. For example, call if:    You or someone else has just been abused.     You think you or someone else is in danger of being abused.   Watch closely for changes in your health, and be sure to contact your doctor if you have any problems.  Where can you learn more?  Go to https://www.[employer].net/patiented  Enter G282 in the search box to learn more about \"Intimate Partner Violence: Care Instructions.\"  Current as of: June 24, 2023               Content Version: 14.0    2551-0279 [employer].   Care instructions adapted " under license by your healthcare professional. If you have questions about a medical condition or this instruction, always ask your healthcare professional. [employer] disclaims any warranty or liability for your use of this information.      Intimate Partner Violence Safety Instructions: Care Instructions  Overview     If you want to save this information but don't think it is safe to take it home, see if a trusted friend can keep it for you. Plan ahead. Know who you can call for help, and memorize the phone number.   Be careful online too. Your online activity may be seen by others. Do not use your personal computer or device to read about this topic. Use a safe computer, such as one at work, a friend's home, or a library.    When you are abused by a spouse or partner, you can take actions to protect yourself and your children.  You can increase your safety whether you decide to stay with your spouse or partner or you decide to leave. You may want to make a safety plan and pack a bag ahead of time. This will help you leave quickly when you decide to. Remember, you cannot change your partner's actions, but you can find help for you and your children. No one deserves to be abused.  Follow-up care is a key part of your treatment and safety. Be sure to make and go to all appointments, and call your doctor if you are having problems. It's also a good idea to know your test results and keep a list of the medicines you take.  How can you care for yourself at home?  Make a plan for your safety   If you decide to stay with your abusive spouse or partner, you can do the following to increase your safety:  Decide what works best to keep you safe in an emergency.  Know who you can call to help you in an emergency.  Decide if you will call the police if you get hurt again. If you can, agree on a signal with your children or neighbor to call the police for you if you need help. You can flash lights or hang  something out of a window.  Choose a safe place to go for a short time if you need to leave home. Memorize the address and phone number.  Learn escape routes out of your home in case you need to leave in a hurry. Teach your children different ways to get out of your home quickly if they need to.  If you can, hide or lock up things that can be used as weapons (guns, knives, hammers).  Learn the number of a domestic violence shelter. Talk to the people there about how they can help.  Find out about other community resources that can help you.  Take pictures of bruises or other injuries if you can. You can also take pictures of things your abuser has broken.  Teach your children that violence is never okay. Tell them that they do not deserve to be hurt.  Pack a bag   Prepare a bag with things you will need if you leave suddenly. Leave it with a friend, a relative, or someone else you trust. You could include the following items in the bag:  A set of keys to your home and car.  Emergency phone numbers and addresses.  Money such as cash or checks. You can also ask a friend, a relative, or someone else you trust to hold money for you.  Copies of legal documents such as house and car titles or rent receipts, birth certificates, Social Security card, voter registration, marriage and 's licenses, and your children's health records.  Personal items you would need for a few days, such as clothes, a toothbrush, toothpaste, and any medicines you or your children need.  A favorite toy or book for your child or children.  Diapers and bottles, if you have very young children.  Pictures that show signs of abuse and violence. You may also add pictures of your abuser.  If you leave   If you decide to leave, you can take the following steps:  Go to the emergency room at a hospital if you have been hurt.  Think about asking the police to be with you as you leave. They can protect you as you leave your home.  If you decide to leave  "secretly, remember that activities can be tracked. Your abuser may still have access to your cell phone, email, and credit cards. It may be possible for these to be traced. Always be aware of your surroundings.  If this is an emergency, do not worry about gathering up anything. Just leave--your safety is most important.  If your abuser moves out, change the locks on the doors. If you have a security system, change the access code.  When should you call for help?   Call 911 anytime you think you may need emergency care. For example, call if:    You or someone else has just been abused.     You think you or someone else is in danger of being abused.   Watch closely for changes in your health, and be sure to contact your doctor if you have any problems.  Where can you learn more?  Go to https://www.[employer].net/patiented  Enter A752 in the search box to learn more about \"Intimate Partner Violence Safety Instructions: Care Instructions.\"  Current as of: June 24, 2023               Content Version: 14.0    0408-3789 [employer].   Care instructions adapted under license by your healthcare professional. If you have questions about a medical condition or this instruction, always ask your healthcare professional. [employer] disclaims any warranty or liability for your use of this information.      Learning About Intimate Partner Violence  What is intimate partner violence?  Intimate partner violence is a type of domestic abuse. It's threatening, emotionally harmful, or violent behavior in a personal relationship. It can happen between past or current partners or spouses. In some relationships both people abuse each other. One partner may be more abusive. Or the abuse may be equal.  Abuse can affect people of any ethnic group, race, or Christian. It can affect teens, adults, or the elderly. And it can happen to people of any sexual orientation, gender, or social status.  Abusers use fear, " bullying, and threats to control their partners. They may control what their partners do. They may control where their partners go or who they see. They may act jealous, controlling, or possessive. These early signs of abuse may happen soon after the start of the relationship. Sometimes it can be hard to notice abuse at first. But after the relationship becomes more serious, the abuse may get worse.  If you are being abused in your relationship, it's important to get help. The abuse is not your fault. You don't have to face it alone.  Be careful  It may not be safe to take home domestic abuse information like this handout. Some people ask a trusted friend to keep it for them. It's also important to plan ahead and to memorize the phone number of places you can go for help. If you are concerned about your safety, do not use your computer, smartphone, or tablet to read about domestic abuse.   What are the types of intimate partner violence?  Abuse can happen in different ways. Each type can happen on its own or in combination with others.  Emotional abuse  Emotional abuse is a pattern of threats, insults, or controlling behavior. It includes verbal abuse. It goes beyond healthy disagreements in a relationship. It's a sign of an unhealthy relationship.  Do you feel threatened, intimidated, or controlled?  Does your partner:  Threaten your children, other family members, or pets?  Use jokes meant to embarrass or shame you?  Call you names?  Tell you that you are a bad parent?  Threaten to take away your children?  Threaten to have you or your family members deported?  Control your access to money or other basic needs?  Control what you do, who you see or talk to, or where you go?  Another form of emotional abuse is denying that it is happening. Or the abuser may act like the abuse is no big deal or is your fault.  Sexual abuse  With sexual abuse, abusers may try to convince or force you to have sex. They may force you  into sex acts you're not comfortable with. Or they may sexually assault you. Sexual abuse can happen even if you are in a committed relationship.  Physical abuse  Physical abuse means that a partner hits, kicks, or does something else to physically hurt you. Physical abuse that starts with a slap might lead to kicking, shoving, and choking over time. The abuser may also threaten to hurt or kill you.  Stalking  Stalking means that an abuser gives you attention that you do not want and that causes you fear. Examples of stalking include:  Following you.  Showing up at places where the abuser isn't invited, such as at your work or school.  Constantly calling or texting you.  What problems can  to?  Intimate partner violence can be very dangerous. It can cause serious, repeated injury. It can even lead to death.  All forms of abuse can cause long-term health problems from the stress of a violent relationship. Verbal abuse can lead to sexual and physical abuse.  Abuse causes:  Emotional pain.  Depression.  Anxiety.  Post-traumatic stress.  Sexual abuse can lead to sexually transmitted infections (such as HIV/AIDS) and unplanned pregnancy.  Pregnancy can be a very dangerous time for people in abusive relationships. Abuse can cause or increase the risk of problems during pregnancy. These include low weight gain, anemia, infections, and bleeding. Abuse may also increase your baby's risk of low birth weight, premature birth, and death.  It can be hard for some victims of abuse to ask for help or to leave their relationship. You may feel scared, stuck, or not sure what steps to take. But it's important not to ignore abuse. Talking to someone you trust could be the first step to ending the abuse and taking care of your own health and happiness again. There are resources available that can help keep you safe.  Where can you get help?  Talk to a trusted friend. Find a local advocacy group, or talk to your doctor about the  "abuse.  Contact the National Domestic Violence Hotline at 0-968-772-SAFE (1-767.692.4389) for more safety tips. They can guide you to groups in your area that can help. Or go to the National Coalition Against Domestic Violence website at www.thehotline.org to learn more.  Domestic violence groups or a counselor in your area can help you make a safety plan for yourself and your children.  When to call for help  Call 911 anytime you think you may need emergency care. For example, call if:  You think that you or someone you know is in danger of being abused.  You have been hurt and can't have someone safely take you to emergency care.  You have just been abused.  A family member has just been abused.  Where can you learn more?  Go to https://www.[employer].net/patiented  Enter S665 in the search box to learn more about \"Learning About Intimate Partner Violence.\"  Current as of: June 24, 2023               Content Version: 14.0    5301-9336 [employer].   Care instructions adapted under license by your healthcare professional. If you have questions about a medical condition or this instruction, always ask your healthcare professional. [employer] disclaims any warranty or liability for your use of this information.      Vaginal Bleeding During Pregnancy: Care Instructions  Overview     It's common to have some vaginal spotting when you are pregnant. In some cases, the bleeding isn't serious. And there aren't any more problems with the pregnancy.  But sometimes bleeding is a sign of a more serious problem. This is more common if the bleeding is heavy or painful. Examples of more serious problems include miscarriage, an ectopic pregnancy, and a problem with the placenta.  You may have to see your doctor again to be sure everything is okay. You may also need more tests to find the cause of the bleeding.  Home treatment may be all you need. But it depends on what is causing the bleeding. Be sure to " tell your doctor if you have any new symptoms or if your symptoms get worse.  The doctor has checked you carefully, but problems can develop later. If you notice any problems or new symptoms, get medical treatment right away.  Follow-up care is a key part of your treatment and safety. Be sure to make and go to all appointments, and call your doctor if you are having problems. It's also a good idea to know your test results and keep a list of the medicines you take.  How can you care for yourself at home?  If your doctor prescribed medicines, take them exactly as directed. Call your doctor if you think you are having a problem with your medicine.  Do not have vaginal sex until your doctor says it's okay.  Do not put anything in your vagina until your doctor says it's okay.  Ask your doctor about other activities you can or can't do.  Get a lot of rest. Being pregnant can make you tired.  Do not use nonsteroidal anti-inflammatory drugs (NSAIDs), such as ibuprofen (Advil, Motrin), naproxen (Aleve), or aspirin, unless your doctor says it is okay.  When should you call for help?   Call 911 anytime you think you may need emergency care. For example, call if:    You passed out (lost consciousness).     You have severe vaginal bleeding. This means you are soaking through a pad each hour for 2 or more hours.     You have sudden, severe pain in your belly or pelvis.   Call your doctor now or seek immediate medical care if:    You have new or worse vaginal bleeding.     You are dizzy or lightheaded, or you feel like you may faint.     You have pain in your belly, pelvis, or lower back.     You think that you are in labor.     You have a sudden release of fluid from your vagina.     You've been having regular contractions for an hour. This means that you've had at least 8 contractions within 1 hour or at least 4 contractions within 20 minutes, even after you change your position and drink fluids.     You notice that your baby  "has stopped moving or is moving much less than normal.   Watch closely for changes in your health, and be sure to contact your doctor if you have any problems.  Where can you learn more?  Go to https://www.[employer].net/patiented  Enter N829 in the search box to learn more about \"Vaginal Bleeding During Pregnancy: Care Instructions.\"  Current as of: July 10, 2023               Content Version: 14.0    7829-2406 [employer].   Care instructions adapted under license by your healthcare professional. If you have questions about a medical condition or this instruction, always ask your healthcare professional. [employer] disclaims any warranty or liability for your use of this information.      Circumcision in Infants: What to Expect at Home  Your Child's Recovery  After circumcision, your baby's penis may look red and swollen. It may have petroleum jelly and gauze on it. The gauze will likely come off when your baby urinates. Follow your doctor's directions about whether to put clean gauze back on your baby's penis or to leave the gauze off. If you need to remove gauze from the penis, use warm water to soak the gauze and gently loosen it.  The doctor may have used a Plastibell device to do the circumcision. If so, your baby will have a plastic ring around the head of the penis. The ring should fall off by itself in 10 to 12 days.  A thin, yellow film may form over the area the day after the procedure. This is part of the normal healing process. It should go away in a few days.  Your baby may seem fussy while the area heals. It may hurt for your baby to urinate. This pain often gets better in 3 or 4 days. But it may last for up to 2 weeks.  Even though your baby's penis will likely start to feel better after 3 or 4 days, it may look worse. The penis often starts to look like it's getting better after about 7 to 10 days.  This care sheet gives you a general idea about how long it will take for " your child to recover. But each child recovers at a different pace. Follow the steps below to help your child get better as quickly as possible.  How can you care for your child at home?  Activity    Let your baby rest as much as possible. Sleeping will help with recovery.     You can give your baby a sponge bath the day after surgery. Ask your doctor when it is okay to give your baby a bath.   Medicines    Your doctor will tell you if and when your child can restart any medicines. The doctor will also give you instructions about your child taking any new medicines.     Your doctor may recommend giving your baby acetaminophen (Tylenol) to help with pain after the procedure. Be safe with medicines. Give your child medicines exactly as prescribed. Call your doctor if you think your child is having a problem with a medicine.     Do not give your child two or more pain medicines at the same time unless the doctor told you to. Many pain medicines have acetaminophen, which is Tylenol. Too much acetaminophen (Tylenol) can be harmful.   Circumcision care    Always wash your hands before and after touching the circumcision area.     Gently wash your baby's penis with plain, warm water after each diaper change, and pat it dry. Do not use soap. Don't use hydrogen peroxide or alcohol. They can slow healing.     Do not try to remove the film that forms on the penis. The film will go away on its own.     Put plenty of petroleum jelly (such as Vaseline) on the circumcision area during each diaper change. This will prevent your baby's penis from sticking to the diaper while it heals.     Fasten your baby's diapers loosely so that there is less pressure on the penis while it heals.   Follow-up care is a key part of your child's treatment and safety. Be sure to make and go to all appointments, and call your doctor if your child is having problems. It's also a good idea to know your child's test results and keep a list of the  "medicines your child takes.  When should you call for help?   Call your doctor now or seek immediate medical care if:    Your baby has a fever over 100.4 F.     Your baby is extremely fussy or irritable, has a high-pitched cry, or refuses to eat.     Your baby does not have a wet diaper within 12 hours after the circumcision.     You find a spot of bleeding larger than a 2-inch [place] from the incision.     Your baby has signs of infection. Signs may include severe swelling; redness; a red streak on the shaft of the penis; or a thick, yellow discharge.   Watch closely for changes in your child's health, and be sure to contact your doctor if:    A Plastibell device was used for the circumcision and the ring has not fallen off after 10 to 12 days.   Where can you learn more?  Go to https://www.[employer].net/patiented  Enter S255 in the search box to learn more about \"Circumcision in Infants: What to Expect at Home.\"  Current as of: October 24, 2023               Content Version: 14.0    0539-1941 [employer].   Care instructions adapted under license by your healthcare professional. If you have questions about a medical condition or this instruction, always ask your healthcare professional. [employer] disclaims any warranty or liability for your use of this information.      "

## 2024-04-29 LAB
ABO/RH(D): NORMAL
ANTIBODY SCREEN: NEGATIVE
SPECIMEN EXPIRATION DATE: NORMAL

## 2024-04-30 ENCOUNTER — LAB (OUTPATIENT)
Dept: LAB | Facility: CLINIC | Age: 34
End: 2024-04-30
Payer: COMMERCIAL

## 2024-04-30 ENCOUNTER — HOSPITAL ENCOUNTER (OUTPATIENT)
Dept: ULTRASOUND IMAGING | Facility: CLINIC | Age: 34
Discharge: HOME | End: 2024-04-30
Attending: FAMILY MEDICINE | Admitting: FAMILY MEDICINE
Payer: COMMERCIAL

## 2024-04-30 DIAGNOSIS — O09.529 SUPERVISION OF HIGH-RISK PREGNANCY OF ELDERLY MULTIGRAVIDA: ICD-10-CM

## 2024-04-30 LAB
ERYTHROCYTE [DISTWIDTH] IN BLOOD BY AUTOMATED COUNT: 12.1 % (ref 10–15)
HBA1C MFR BLD: 4.9 %
HCT VFR BLD AUTO: 36.4 % (ref 35–47)
HGB BLD-MCNC: 12.7 G/DL (ref 11.7–15.7)
MCH RBC QN AUTO: 31.4 PG (ref 26.5–33)
MCHC RBC AUTO-ENTMCNC: 34.9 G/DL (ref 31.5–36.5)
MCV RBC AUTO: 90 FL (ref 78–100)
PLATELET # BLD AUTO: 214 10E3/UL (ref 150–450)
RBC # BLD AUTO: 4.05 10E6/UL (ref 3.8–5.2)
WBC # BLD AUTO: 7.5 10E3/UL (ref 4–11)

## 2024-04-30 PROCEDURE — 87389 HIV-1 AG W/HIV-1&-2 AB AG IA: CPT

## 2024-04-30 PROCEDURE — 83036 HEMOGLOBIN GLYCOSYLATED A1C: CPT

## 2024-04-30 PROCEDURE — 87340 HEPATITIS B SURFACE AG IA: CPT

## 2024-04-30 PROCEDURE — 85027 COMPLETE CBC AUTOMATED: CPT

## 2024-04-30 PROCEDURE — 86900 BLOOD TYPING SEROLOGIC ABO: CPT

## 2024-04-30 PROCEDURE — 86780 TREPONEMA PALLIDUM: CPT

## 2024-04-30 PROCEDURE — 87086 URINE CULTURE/COLONY COUNT: CPT

## 2024-04-30 PROCEDURE — 86901 BLOOD TYPING SEROLOGIC RH(D): CPT

## 2024-04-30 PROCEDURE — 86850 RBC ANTIBODY SCREEN: CPT

## 2024-04-30 PROCEDURE — 76801 OB US < 14 WKS SINGLE FETUS: CPT

## 2024-04-30 PROCEDURE — 86803 HEPATITIS C AB TEST: CPT

## 2024-04-30 PROCEDURE — 36415 COLL VENOUS BLD VENIPUNCTURE: CPT

## 2024-04-30 PROCEDURE — 86762 RUBELLA ANTIBODY: CPT

## 2024-05-01 LAB
HBV SURFACE AG SERPL QL IA: NONREACTIVE
HCV AB SERPL QL IA: NONREACTIVE
HIV 1+2 AB+HIV1 P24 AG SERPL QL IA: NONREACTIVE
RUBV IGG SERPL QL IA: 3.75 INDEX
RUBV IGG SERPL QL IA: POSITIVE
T PALLIDUM AB SER QL: NONREACTIVE

## 2024-05-02 LAB — BACTERIA UR CULT: NO GROWTH

## 2024-05-06 ENCOUNTER — TRANSFERRED RECORDS (OUTPATIENT)
Dept: HEALTH INFORMATION MANAGEMENT | Facility: CLINIC | Age: 34
End: 2024-05-06
Payer: COMMERCIAL

## 2024-05-09 ENCOUNTER — TELEPHONE (OUTPATIENT)
Dept: FAMILY MEDICINE | Facility: CLINIC | Age: 34
End: 2024-05-09

## 2024-05-09 ENCOUNTER — PRENATAL OFFICE VISIT (OUTPATIENT)
Dept: FAMILY MEDICINE | Facility: CLINIC | Age: 34
End: 2024-05-09
Payer: COMMERCIAL

## 2024-05-09 VITALS
OXYGEN SATURATION: 99 % | TEMPERATURE: 98.5 F | DIASTOLIC BLOOD PRESSURE: 70 MMHG | WEIGHT: 198.8 LBS | RESPIRATION RATE: 18 BRPM | HEART RATE: 94 BPM | BODY MASS INDEX: 33.12 KG/M2 | SYSTOLIC BLOOD PRESSURE: 92 MMHG | HEIGHT: 65 IN

## 2024-05-09 DIAGNOSIS — O09.90 HIGH-RISK PREGNANCY SUPERVISION, UNSPECIFIED TRIMESTER: Primary | ICD-10-CM

## 2024-05-09 DIAGNOSIS — Z33.3 SURROGATE PREGNANCY: ICD-10-CM

## 2024-05-09 DIAGNOSIS — Z33.3 SURROGATE PREGNANCY: Primary | ICD-10-CM

## 2024-05-09 PROCEDURE — 99213 OFFICE O/P EST LOW 20 MIN: CPT | Performed by: FAMILY MEDICINE

## 2024-05-09 ASSESSMENT — PAIN SCALES - GENERAL: PAINLEVEL: NO PAIN (0)

## 2024-05-09 NOTE — PROGRESS NOTES
"  SUBJECTIVE:     HPI:    This is a 34 year old female patient,  who presents for her first obstetrical visit.    BEATRICE: 2024, by Embryo Transfer for [facility]cker couple who is friends.  She is 8w5d weeks.  Her cycles are regular.  Her last menstrual period was normal.   Since her LMP, she has experienced  nausea and fatigue).   She denies vaginal discharge, vaginal bleeding, hemorrhoids, and constipation.    Additional History:   Epidural didn't work well after accident  Goes fast  Unknown paternal and maternal history  On progesterone supp qid until 9w  No PAST MEDICAL HISTORY of DM, HTN, PPH with retained placenta   Had FHT check last week at [facility]    Have you travelled during the pregnancy?Yes, If yes, where? [place]  Have your sexual partner(s) travelled during the pregnancy?Yes, If yes, where? [place]       HISTORY:   Planned Pregnancy: Yes  Marital Status:   Occupation: RN  Living in Household: Spouse and Children    Past History:  Her past medical history .      She has a history of      Since her last LMP she denies use of alcohol, tobacco and street drugs.    Past medical, surgical, social and family history were reviewed and updated in [facility].        Current Outpatient Medications   Medication Sig Dispense Refill    Prenatal Vit-Fe Fumarate-FA (PRENATAL MULTIVITAMIN  PLUS IRON) 27-1 MG TABS Take by mouth daily      norethindrone (AYGESTIN) 5 MG tablet Take 1 tablet (5 mg) by mouth daily (Patient not taking: Reported on 2024) 90 tablet 0     No current facility-administered medications for this visit.       ROS:   12 point review of systems negative other than symptoms noted below or in the HPI.      OBJECTIVE:     EXAM:  BP 92/70   Pulse 94   Temp 98.5  F (36.9  C) (Temporal)   Resp 18   Ht 1.657 m (5' 5.25\")   Wt 90.2 kg (198 lb 12.8 oz)   SpO2 99%   BMI 32.83 kg/m   Body mass index is 32.83 kg/m .    GENERAL: alert and no distress  EYES: Eyes grossly normal to inspection, PERRL " and conjunctivae and sclerae normal  HENT: ear canals and TM's normal, nose and mouth without ulcers or lesions  NECK: no adenopathy, no asymmetry, masses, or scars  RESP: lungs clear to auscultation - no rales, rhonchi or wheezes  CV: regular rate and rhythm, normal S1 S2, no S3 or S4, no murmur, click or rub, no peripheral edema  ABDOMEN: soft, nontender, no hepatosplenomegaly, no masses and bowel sounds normal  MS: no gross musculoskeletal defects noted, no edema  SKIN: no suspicious lesions or rashes  NEURO: Normal strength and tone, mentation intact and speech normal  PSYCH: mentation appears normal, affect normal/bright    ASSESSMENT/PLAN:       ICD-10-CM    1. High-risk pregnancy supervision, unspecified trimester  O09.90       2. Surrogate pregnancy  Z33.3           34 year old , 8w5d weeks of pregnancy with BEATRICE of 2024, by Embryo Transfer    Discussed as follows:  Surrogacy.  Plan for level 2 MFM ultrasound and consultation.  Continue with progesterone supplementation for the next week.  As per the surrogacy center's recommendations.  Plan for prenatal testing with NIPT per the patient's wishes.  Plan to proceed after 13 weeks.    Counseling given:   - Follow up in 4-6 weeks for return OB visit.  - Recommended weight gain for pregnancy: 15-25 lbs.       Sylvester Gallardo MD

## 2024-05-09 NOTE — PATIENT INSTRUCTIONS
Learning About Pregnancy  Your Care Instructions     Your health in the early weeks of your pregnancy is particularly important for your baby's health. Take good care of yourself. Anything you do that harms your body can also harm your baby.  Make sure to go to all of your doctor appointments. Regular checkups will help keep you and your baby healthy.  How can you care for yourself at home?  Diet    Choose healthy foods like fruits, vegetables, whole grains, lean proteins, and healthy fats.     Choose foods that are good sources of calcium, iron, and folate. You can try dairy products, dark leafy greens, fortified orange juice and cereals, almonds, broccoli, dried fruit, and beans.     Do not skip meals or go for many hours without eating. If you are nauseated, try to eat a small, healthy snack every 2 to 3 hours.     Avoid fish that are high in mercury. These include shark, swordfish, tom mackerel, marlin, orange roughy, and bigeye tuna, as well as tilefish from the [facility].     It's okay to eat up to 8 to 12 ounces a week of fish that are low in mercury or up to 4 ounces a week of fish that have medium levels of mercury. Some fish that are low in mercury are salmon, shrimp, canned light tuna, cod, and tilapia. Some fish that have medium levels of mercury are halibut and white albacore tuna.     Drink plenty of fluids. If you have kidney, heart, or liver disease and have to limit fluids, talk with your doctor before you increase the amount of fluids you drink.     Limit caffeine to about 200 to 300 mg per day. On average, a cup of brewed coffee has around 80 to 100 mg of caffeine.     Do not drink alcohol, such as beer, wine, or hard liquor.     Take a multivitamin that contains at least 400 micrograms (mcg) of folic acid to help prevent birth defects. Fortified cereal and whole wheat bread are good additional sources of folic acid.     Increase the calcium in your diet. Try to drink a quart of skim milk  each day. You may also take calcium supplements and choose foods such as cheese and yogurt.   Lifestyle    Make sure you go to your follow-up appointments.     Get plenty of rest. You may be unusually tired while you are pregnant.     Get at least 30 minutes of exercise on most days of the week. Walking is a good choice. If you have not exercised in the past, start out slowly. Take several short walks each day.     Do not smoke. If you need help quitting, talk to your doctor about stop-smoking programs. These can increase your chances of quitting for good.     Do not touch cat feces or litter boxes. Also, wash your hands after you handle raw meat, and fully cook all meat before you eat it. Wear gloves when you work in the yard or garden, and wash your hands well when you are done. Cat feces, raw or undercooked meat, and contaminated dirt can cause an infection that may harm your baby or lead to a miscarriage.     Avoid things that can make your body too hot and may be harmful to your baby, such as a hot tub or sauna. Or talk with your doctor before doing anything that raises your body temperature. Your doctor can tell you if it's safe.     Avoid chemical fumes, paint fumes, or poisons.     Do not use illegal drugs, marijuana, or alcohol.   Medicines    Review all of your medicines with your doctor. Some of your routine medicines may need to be changed to protect your baby.     Use acetaminophen (Tylenol) to relieve minor problems, such as a mild headache or backache or a mild fever with cold symptoms. Do not use nonsteroidal anti-inflammatory drugs (NSAIDs), such as ibuprofen (Advil, Motrin) or naproxen (Aleve), unless your doctor says it is okay.     Do not take two or more pain medicines at the same time unless the doctor told you to. Many pain medicines have acetaminophen, which is Tylenol. Too much acetaminophen (Tylenol) can be harmful.     Take your medicines exactly as prescribed. Call your doctor if you  "think you are having a problem with your medicine.   To manage morning sickness    Keep food in your stomach, but not too much at once. Try eating five or six small meals a day instead of three large meals.     For nausea when you wake up, eat a small snack, such as a couple of crackers or pretzels, before rising. Allow a few minutes for your stomach to settle before you slowly get up.     Try to avoid smells and foods that make you feel nauseated. High-fat or greasy foods, milk, and coffee may make nausea worse. Some foods that may be easier to tolerate include cold, spicy, sour, and salty foods.     Drink enough fluids. Water and other caffeine-free drinks are good choices.     Take your prenatal vitamins at night on a full stomach.     Try foods and drinks made with shakila. Shakila may help with nausea.     Get lots of rest. Morning sickness may be worse when you are tired.     Talk to your doctor about over-the-counter products, such as vitamin B6 or doxylamine, to help relieve symptoms.     Try a P6 acupressure wrist band. These anti-nausea wristbands help some people.   Follow-up care is a key part of your treatment and safety. Be sure to make and go to all appointments, and call your doctor if you are having problems. It's also a good idea to know your test results and keep a list of the medicines you take.  Where can you learn more?  Go to [email or web address] [email or web address]  Enter E868 in the search box to learn more about \"Learning About Pregnancy.\"  Current as of: July 10, 2023               Content Version: 14.0    7916-3943 [employer].   Care instructions adapted under license by your healthcare professional. If you have questions about a medical condition or this instruction, always ask your healthcare professional. [employer] disclaims any warranty or liability for your use of this information.      Weeks 6 to 10 of Your Pregnancy: Care Instructions  During these weeks of " "pregnancy, your body goes through many changes. You may start to feel different, both in your body and your emotions. Each pregnancy is different, so there's no \"right\" way to feel. These early weeks are a time to make healthy choices for you and your pregnancy.    Take a daily prenatal vitamin. Choose one with folic acid in it.    Avoid alcohol, tobacco, and drugs (including marijuana). If you need help quitting, talk to your doctor.    Drink plenty of liquids.  Be sure to drink enough water. And limit sodas, other sweetened drinks, and caffeine.     Choose foods that are good sources of calcium, iron, and folate.  You can try dairy products, dark leafy greens, fortified orange juice and cereals, almonds, broccoli, dried fruit, and beans.     Avoid foods that may be harmful.  Don't eat raw meat, deli meat, raw seafood, or raw eggs. Avoid soft cheese and unpasteurized dairy, like Brie and blue cheese. And don't eat fish that contains a lot of mercury, like shark and swordfish.     Don't touch paty litter or cat poop.  They can cause an infection that could be harmful during pregnancy.     Avoid things that can make your body too hot.  For example, avoid hot tubs and saunas.     Soothe morning sickness.  Try eating 5 or 6 small meals a day, getting some fresh air, or using stacie to control symptoms.     Ask your doctor about flu and COVID-19 shots.  Getting them can help protect against infection.   Follow-up care is a key part of your treatment and safety. Be sure to make and go to all appointments, and call your doctor if you are having problems. It's also a good idea to know your test results and keep a list of the medicines you take.  Where can you learn more?  Go to https://www.Gullivearth.net/patiented  Enter G112 in the search box to learn more about \"Weeks 6 to 10 of Your Pregnancy: Care Instructions.\"  Current as of: July 10, 2023               Content Version: 14.0    6657-5224 [employer]. "   Care instructions adapted under license by your healthcare professional. If you have questions about a medical condition or this instruction, always ask your healthcare professional. [employer] disclaims any warranty or liability for your use of this information.         Managing Morning Sickness (01:55)  Your health professional recommends that you watch this short online health video.  Learn tips for dealing with morning sickness, no matter what time of day you have it.  Purpose:  Gives tips for managing morning sickness, including eating small low-fat meals and avoiding caffeine and spicy food.  Goal:  The user will learn tips for dealing with morning sickness during pregnancy.     How to watch the video    Scan the QR code   OR Visit the website    [email or web address]   Current as of: July 10, 2023               Content Version: 14.0    2899-4939 [employer]   Care instructions adapted under license by your healthcare professional. If you have questions about a medical condition or this instruction, always ask your healthcare professional. [employer]) disclaims any warranty or liability for your use of this information.      Pregnancy and Heartburn: Care Instructions  Overview     Heartburn is a common problem during pregnancy.  Heartburn happens when stomach acid backs up into the tube that carries food to the stomach. This tube is called the esophagus. Early in pregnancy, heartburn is caused by hormone changes that slow down digestion. Later on, it's also caused by the large uterus pushing up on the stomach.  Even though you can't fix the cause, there are things you can do to get relief. Treating heartburn during pregnancy focuses first on making lifestyle changes, like changing what and how you eat, and on taking medicines.  Heartburn usually improves or goes away after childbirth.  Follow-up care is a key part of your treatment and safety. Be sure to make  "and go to all appointments, and call your doctor if you are having problems. It's also a good idea to know your test results and keep a list of the medicines you take.  How can you care for yourself at home?  Eat small, frequent meals.  Avoid foods that make your symptoms worse, such as chocolate, peppermint, and spicy foods. Avoid drinks with caffeine, such as coffee, tea, and sodas.  Avoid bending over or lying down after meals.  Take a short walk after you eat.  If heartburn is a problem at night, do not eat for 2 hours before bedtime.  Take antacids like Mylanta, Maalox, Rolaids, or Tums. Do not take antacids that have sodium bicarbonate, magnesium trisilicate, or aspirin. Be careful when you take over-the-counter antacid medicines. Many of these medicines have aspirin in them. While you are pregnant, do not take aspirin or medicines that contain aspirin unless your doctor says it is okay.  If you're not getting relief, talk to your doctor. You may be able to take a stronger acid-reducing medicine.  When should you call for help?   Call your doctor now or seek immediate medical care if:    You have new or worse belly pain.     You are vomiting.   Watch closely for changes in your health, and be sure to contact your doctor if:    You have new or worse symptoms of reflux.     You are losing weight.     You have trouble or pain swallowing.     You do not get better as expected.   Where can you learn more?  Go to [email or web address] [email or web address]/patiented  Enter U946 in the search box to learn more about \"Pregnancy and Heartburn: Care Instructions.\"  Current as of: July 10, 2023               Content Version: 14.0    8159-2233 [employer].   Care instructions adapted under license by your healthcare professional. If you have questions about a medical condition or this instruction, always ask your healthcare professional. [employer] disclaims any warranty or liability for your use of this " information.      Constipation: Care Instructions  Overview     Constipation means that you have a hard time passing stools (bowel movements). People pass stools from 3 times a day to once every 3 days. What is normal for you may be different. Constipation may occur with pain in the rectum and cramping. The pain may get worse when you try to pass stools. Sometimes there are small amounts of bright red blood on toilet paper or the surface of stools. This is because of enlarged veins near the rectum (hemorrhoids).  A few changes in your diet and lifestyle may help you avoid ongoing constipation. Your doctor may also prescribe medicine to help loosen your stool.  Some medicines can cause constipation. These include pain medicines and antidepressants. Tell your doctor about all the medicines you take. Your doctor may want to make a medicine change to ease your symptoms.  Follow-up care is a key part of your treatment and safety. Be sure to make and go to all appointments, and call your doctor if you are having problems. It's also a good idea to know your test results and keep a list of the medicines you take.  How can you care for yourself at home?  Drink plenty of fluids. If you have kidney, heart, or liver disease and have to limit fluids, talk with your doctor before you increase the amount of fluids you drink.  Include high-fiber foods in your diet each day. These include fruits, vegetables, beans, and whole grains.  Get at least 30 minutes of exercise on most days of the week. Walking is a good choice. You also may want to do other activities, such as running, swimming, cycling, or playing tennis or team sports.  Take a fiber supplement, such as Citrucel or Metamucil, every day. Read and follow all instructions on the label.  Schedule time each day for a bowel movement. A daily routine may help. Take your time having a bowel movement, but don't sit for more than 10 minutes at a time. And don't strain too  "much.  Support your feet with a small step stool when you sit on the toilet. This helps flex your hips and places your pelvis in a squatting position.  Your doctor may recommend an over-the-counter laxative to relieve your constipation. Examples are Milk of Magnesia and MiraLax. Read and follow all instructions on the label. Do not use laxatives on a long-term basis.  When should you call for help?   Call your doctor now or seek immediate medical care if:    You have new or worse belly pain.     You have new or worse nausea or vomiting.     You have blood in your stools.   Watch closely for changes in your health, and be sure to contact your doctor if:    Your constipation is getting worse.     You do not get better as expected.   Where can you learn more?  Go to [email or web address]://[email or web address]/[email or web address]  Enter P343 in the search box to learn more about \"Constipation: Care Instructions.\"  Current as of: October 19, 2023               Content Version: 14.0    7360-8991 [employer].   Care instructions adapted under license by your healthcare professional. If you have questions about a medical condition or this instruction, always ask your healthcare professional. [employer] disclaims any warranty or liability for your use of this information.      Learning About High-Iron Foods  What foods are high in iron?     The foods you eat contain nutrients, such as vitamins and minerals. Iron is a nutrient. Your body needs the right amount to stay healthy and work as it should. You can use the list below to help you make choices about which foods to eat.  Here are some foods that contain iron. They have 1 to 2 milligrams of iron per serving.  Fruits  Figs (dried), 5 figs  Vegetables  Asparagus (canned), 6 haines  Iliana, beet, Swiss chard, or turnip greens, 1 cup  Dried peas, cooked,   cup  Seaweed, spirulina (dried),   cup  Spinach, (cooked)   cup or (raw) 1 cup  Grains  Cereals, fortified with iron, 1 " "cup  Grits (instant, cooked), fortified with iron,   cup  Meats and other protein foods  Beans (kidney, lima, navy, white), canned or cooked,   cup  Beef or lamb, 3 oz  Chicken giblets, 3 oz  Chickpeas (garbanzo beans),   cup  Liver of beef, lamb, or pork, 3 oz  Oysters (cooked), 3 oz  Sardines (canned), 3 oz  Soybeans (boiled),   cup  Tofu (firm),   cup  Work with your doctor to find out how much of this nutrient you need. Depending on your health, you may need more or less of it in your diet.  Where can you learn more?  Go to https://www.[employer]Union.net/patiented  Enter R005 in the search box to learn more about \"Learning About High-Iron Foods.\"  Current as of: September 20, 2023               Content Version: 14.0    3008-0808 [employer].   Care instructions adapted under license by your healthcare professional. If you have questions about a medical condition or this instruction, always ask your healthcare professional. [employer] disclaims any warranty or liability for your use of this information.      Rh Antibodies Screening During Pregnancy: About This Test  What is it?     The Rh antibodies screening test is a blood test. It checks your blood for Rh antibodies. If you have Rh-negative blood and have been exposed to Rh-positive blood, your immune system may make antibodies to attack the Rh-positive blood. When a pregnant woman has these antibodies, it is called Rh sensitization.  Why is this test done?  The Rh antibodies screening test is done during pregnancy to find out if your baby is at risk for Rh disease. This can happen if you have Rh-negative blood and your baby has Rh-positive blood. If your Rh-negative blood mixes with Rh-positive blood, your immune system will make antibodies to attack the Rh-positive blood.  During pregnancy, these antibodies could attach to the baby's red blood cells. This can cause your baby to have serious health problems. The results of this test " "will help your doctor know how to best care for you and your baby during your pregnancy.  How do you prepare for the test?  In general, there's nothing you have to do before this test, unless your doctor tells you to.  How is the test done?  A health professional uses a needle to take a blood sample, usually from the arm.  What happens after the test?  You will probably be able to go home right away. It depends on the reason for the test.  You can go back to your usual activities right away.  Follow-up care is a key part of your treatment and safety. Be sure to make and go to all appointments, and call your doctor if you are having problems. It's also a good idea to keep a list of the medicines you take. Ask your doctor when you can expect to have your test results.  Where can you learn more?  Go to https://[email or web address] [email or web address]/[email or web address]  Enter P722 in the search box to learn more about \"Rh Antibodies Screening During Pregnancy: About This Test.\"  Current as of: July 10, 2023               Content Version: 14.0    9223-7034 [employer].   Care instructions adapted under license by your healthcare professional. If you have questions about a medical condition or this instruction, always ask your healthcare professional. [employer] disclaims any warranty or liability for your use of this information.      Learning About Preventing Rh Disease  What is Rh disease?     Rh disease can be a serious problem in pregnancy. It happens when substances called antibodies in the mother's blood cause red blood cells in her baby's blood to be destroyed. This can occur when the blood types of a mother and her baby do not match.  All blood has an Rh factor. This is what makes a blood type positive or negative. When you are Rh-negative, your baby may be Rh-negative or Rh-positive. If your baby has Rh-positive blood and it mixes with yours, your body will make antibodies. This is called Rh sensitization.  Most of " "the time, this is not a problem in a first pregnancy. But in future pregnancies, it could cause Rh disease.  A  with Rh disease has mild anemia and may have jaundice. In severe cases, anemia, jaundice, and swelling can be very dangerous or fatal. Some babies need to be delivered early. Some need special care in the NICU. A very sick baby will need a blood transfusion before or after birth.  Fortunately, Rh sensitization is usually easy to prevent.  That's why it's important to get your Rh status checked in your first trimester. It doesn't cause any warning signs. A blood test is the only way to know if you are Rh-sensitive or are at risk for it.  How can you prevent Rh disease?  If you are Rh-negative, your doctor gives you an Rh immune globulin shot (such as RhoGAM). It helps prevent your body from making the antibodies that attack your baby's red blood cells.  Timing is important. You need the shot at certain times during your pregnancy. And you need one anytime there is a chance that your baby's blood might mix with yours. That can happen with certain prenatal tests or when you have pregnancy bleeding, such as:  Right after any pregnancy loss, amniocentesis, or CVS testing.  After turning of a breech baby.  Before and maybe after childbirth. Your doctor gives you a shot around week 28. If your  is Rh-positive, you will have another shot.  Follow-up care is a key part of your treatment and safety. Be sure to make and go to all appointments, and call your doctor if you are having problems. It's also a good idea to know your test results and keep a list of the medicines you take.  Where can you learn more?  Go to https://www.[email or web address]/patiented  Enter W177 in the search box to learn more about \"Learning About Preventing Rh Disease.\"  Current as of: July 10, 2023               Content Version: 14.0    9091-8842 Healthwise, Incorporated.   Care instructions adapted under license by [facility] healthcare " professional. If you have questions about a medical condition or this instruction, always ask your healthcare professional. Healthwise, [facility] disclaims any warranty or liability for your use of this information.      Learning About Rh Immunoglobulin Shots  Introduction     An Rh immunoglobulin shot is given to pregnant women who have Rh-negative blood.  You may have Rh-negative blood, and your baby may have Rh-positive blood. If the two types of blood mix, your body will make antibodies. This is called Rh sensitization. Most of the time, this is not a problem the first time you're pregnant. But it could cause problems in future pregnancies.  This shot keeps your body from making the antibodies. You get the shot around 28 weeks of pregnancy. After the birth, your baby's blood is tested. If the blood is Rh positive, you will get another shot. You may also get the shot if you have vaginal bleeding while you are pregnant or if you have a miscarriage. These shots protect future pregnancies.  Women with Rh negative blood will need this shot each time they get pregnant.  Example  Rh immunoglobulin (HypRho-D, MICRhoGAM, and RhoGAM)  Possible side effects  Rare side effects may include:  Some mild pain where you got the shot.  A slight fever.  An allergic reaction.  You may have other side effects not listed here. Check the information that comes with your medicine.  What to know about taking this medicine  You may need more than one shot. You may need the shot again:  After amniocentesis, fetal blood sampling, or chorionic villus sampling tests.  If you have bleeding in your second or third trimester.  After turning of a breech baby.  After an injury to the belly while you are pregnant.  After a miscarriage or an .  Before or right after treatment for an ectopic or a partial molar pregnancy.  Tell your doctor if you have any allergies or have had a bad response to medicines in the past.  If you get this shot  "within 3 months of getting a live-virus vaccine, the vaccine may not work. Your doctor will tell you if you need more vaccine.  Check with your doctor or pharmacist before you use any other medicines. This includes over-the-counter medicines. Make sure your doctor knows all of the medicines, vitamins, herbs, and supplements you take. Taking some medicines at the same time can cause problems.  Where can you learn more?  Go to https://[email or web address].LiquidM/patiented  Enter V615 in the search box to learn more about \"Learning About Rh Immunoglobulin Shots.\"  Current as of: July 10, 2023               Content Version: 14.0    0808-3660 [employer].   Care instructions adapted under license by your healthcare professional. If you have questions about a medical condition or this instruction, always ask your healthcare professional. [employer] disclaims any warranty or liability for your use of this information.      Rubella (Guamanian Measles): Care Instructions  Overview  Rubella, also called Guamanian measles or 3-day measles, is a disease caused by a virus. It spreads by coughs, sneezes, and close contact. Rubella usually is mild and does not cause long-term problems. But if you are pregnant and get it, you can give the disease to your unborn baby. This can cause serious birth defects.  While you have rubella, you may get a rash and a mild fever, and the lymph glands in your neck may swell. Older children often have a fever, eye pain, a sore throat, and body aches. You can relieve most symptoms with care at home. Avoid being around others, especially pregnant people, until your rash has been gone for at least 4 days. People who have not had this disease before or have not had the vaccine have the greatest chance of getting the virus.  Follow-up care is a key part of your treatment and safety. Be sure to make and go to all appointments, and call your doctor if you are having problems. It's also a good " "idea to know your test results and keep a list of the medicines you take.  How can you care for yourself at home?  Drink plenty of fluids. If you have kidney, heart, or liver disease and have to limit fluids, talk with your doctor before you increase the amount of fluids you drink.  Get plenty of rest to help your body heal.  Take an over-the-counter pain medicine, such as acetaminophen (Tylenol), ibuprofen (Advil, Motrin), or naproxen (Aleve), to reduce fever and discomfort. Read and follow all instructions on the label. Do not give aspirin to anyone younger than 20. It has been linked to Reye syndrome, a serious illness.  Do not take two or more pain medicines at the same time unless the doctor told you to. Many pain medicines have acetaminophen, which is Tylenol. Too much acetaminophen (Tylenol) can be harmful.  Try not to scratch the rash. Put cold, wet cloths on the rash to reduce itching.  Do not smoke. Smoking can make your symptoms worse. If you need help quitting, talk to your doctor about stop-smoking programs and medicines. These can increase your chances of quitting for good.  Avoid contact with people who have never had rubella and who have not been immunized.  When should you call for help?   Call your doctor now or seek immediate medical care if:    You have a fever with a stiff neck or a severe headache.     You are sensitive to light or feel very sleepy or confused.   Watch closely for changes in your health, and be sure to contact your doctor if:    You do not get better as expected.   Where can you learn more?  Go to [email or web address]://[email or web address]/[email or web address]  Enter B812 in the search box to learn more about \"Rubella (Frisian Measles): Care Instructions.\"  Current as of: June 12, 2023               Content Version: 14.0    5614-7595 [employer].   Care instructions adapted under license by your healthcare professional. If you have questions about a medical condition or this instruction, " always ask your healthcare professional. Healthwise, [facility] disclaims any warranty or liability for your use of this information.      Gonorrhea and Chlamydia: About These Tests  What is it?  These tests use a sample of urine or other body fluid to look for the bacteria that cause these sexually transmitted infections (STIs). The fluid sample can come from the cervix, vagina, rectum, throat, or eyes.  Why is this test done?  These tests may be done to:  Find out if symptoms are caused by gonorrhea or chlamydia.  Check people who are at high risk of being infected with gonorrhea or chlamydia.  Retest people several months after they have been treated for gonorrhea or chlamydia.  Check for infection in your  if you had a gonorrhea or chlamydia infection at the time of delivery.  How can you prepare for the test?  If you are going to have a urine test, do not urinate for at least 1 hour before the test.  If you think you may have chlamydia or gonorrhea, don't have sexual intercourse until you get your test results. And you may want to have tests for other STIs, such as HIV.  How is the test done?  For a direct sample, a swab is used to collect body fluid from the cervix, vagina, rectum, throat, or eyes. Your doctor may collect the sample. Or you may be given instructions on how to collect your own sample.  For a urine sample, you will collect the urine that comes out when you first start to urinate. Don't wipe the genital area clean before you urinate.  How long does the test take?  The test will take a few minutes.  What happens after the test?  You will be able to go home right away.  You can go back to your usual activities right away.  If you do have an infection, don't have sexual intercourse for 7 days after you start treatment. And your sex partner(s) should also be treated.  Follow-up care is a key part of your treatment and safety. Be sure to make and go to all appointments, and call your doctor  "if you are having problems. It's also a good idea to keep a list of the medicines you take. Ask your doctor when you can expect to have your test results.  Where can you learn more?  Go to https://www.[employer].net/patiented  Enter K976 in the search box to learn more about \"Gonorrhea and Chlamydia: About These Tests.\"  Current as of: November 27, 2023               Content Version: 14.0    7306-6773 [employer].   Care instructions adapted under license by your healthcare professional. If you have questions about a medical condition or this instruction, always ask your healthcare professional. [employer] disclaims any warranty or liability for your use of this information.      Trichomoniasis: About This Test  What is it?     This test uses a sample of urine or other body fluid to look for the tiny parasite that causes trichomoniasis (also called trich). The fluid sample can come from the vagina, cervix, or urethra. Your doctor may choose to use one or more of many available tests.  Why is it done?  A trich test may be done to:  Find out if symptoms are caused by trich.  Check people who are at high risk for being infected with trich.  Check after treatment to make sure that the infection is gone.  How do you prepare for the test?  If you are going to have a urine test, do not urinate for at least 1 hour before the test.  How is the test done?  For a direct sample, a swab is used to collect body fluid from the cervix, vagina, or urethra. Your doctor may collect the sample. Or you may be given instructions on how to collect your own sample.  For a urine sample, you will collect the urine that comes out when you first start to urinate. Don't wipe the area clean before you urinate.  How long does the test take?  It will take a few minutes to collect a sample.  What happens after the test?  You can go home right away.  You can go back to your usual activities right away.  You may get the " test results the same day or several days later. It depends on the test used.  If you do have an infection, don't have sexual intercourse for 7 days after you start treatment. Your sex partner or partners should also be treated.  Follow-up care is a key part of your treatment and safety. Be sure to make and go to all appointments, and call your doctor if you are having problems. Ask your doctor when you can expect to have your test results.  Current as of: November 27, 2023               Content Version: 14.0    9783-4725 [employer].   Care instructions adapted under license by your healthcare professional. If you have questions about a medical condition or this instruction, always ask your healthcare professional. [employer] disclaims any warranty or liability for your use of this information.      HIV Testing: Care Instructions  Overview  You can get tested for the human immunodeficiency virus (HIV). Most doctors use a blood test to check for HIV antibodies and antigens in your blood. It may also check for the genetic material (RNA) of HIV. Some tests use saliva to check for HIV antibodies. But these aren't as accurate. For example, they may give a false result if you've just been infected.  What do the results mean?    Normal (negative)    No HIV antibodies, antigens, or RNA were found.  You may need more testing. It can make sure your test results are correct.    Uncertain (indeterminate)    Test results didn't clearly show if you have an HIV infection.  HIV antibodies or antigens may not have formed yet.  Some other type of antibody or antigen may have affected the results.  You will need another test to be sure.    Abnormal (positive)    HIV antibodies, antigens, or RNA were found.  If you haven't had an RNA test yet, one will be done. If it's positive, you have HIV.  If your test result is positive, your doctor will talk to you. You will discuss starting treatment.  Follow-up care  "is a key part of your treatment and safety. Be sure to make and go to all appointments, and call your doctor if you are having problems. It's also a good idea to know your test results and keep a list of the medicines you take.  Where can you learn more?  Go to [email or web address]  Enter T792 in the search box to learn more about \"HIV Testing: Care Instructions.\"  Current as of: June 12, 2023               Content Version: 14.0    7410-7425 [employer].   Care instructions adapted under license by your healthcare professional. If you have questions about a medical condition or this instruction, always ask your healthcare professional. [employer] disclaims any warranty or liability for your use of this information.      Hepatitis C Virus Tests: About These Tests  What are they?     Hepatitis C virus tests are blood tests that check for substances in the blood that show whether you have hepatitis C now or had it in the past. The tests can also tell you what type of hepatitis C you have and how severe the disease is. This can help your doctor with treatment.  If the tests show that you have long-term hepatitis C, you need to take steps to prevent spreading the disease.  Why are these tests done?  You may need these tests if:  You have symptoms of hepatitis.  You may have been exposed to the virus. You are more likely to have been exposed to the virus if you inject drugs or are exposed to body fluids (such as if you are a health care worker).  You've had other tests that show you have liver problems.  You are 18 to 79 years old.  You have an HIV infection.  The tests also are done to help your doctor decide about your treatment and see how well it works.  How do you prepare for the test?  In general, there's nothing you have to do before this test, unless your doctor tells you to.  How is the test done?  A health professional uses a needle to take a blood sample, usually from " "the arm.  What happens after these tests?  You will probably be able to go home right away.  You can go back to your usual activities right away.  Follow-up care is a key part of your treatment and safety. Be sure to make and go to all appointments, and call your doctor if you are having problems. It's also a good idea to keep a list of the medicines you take. Ask your doctor when you can expect to have your test results.  Where can you learn more?  Go to https://www.[email or web address].net/patiented  Enter W551 in the search box to learn more about \"Hepatitis C Virus Tests: About These Tests.\"  Current as of: June 12, 2023               Content Version: 14.0    7730-5069 [employer].   Care instructions adapted under license by your healthcare professional. If you have questions about a medical condition or this instruction, always ask your healthcare professional. [employer] disclaims any warranty or liability for your use of this information.      Learning About Fetal Ultrasound Results  What is a fetal ultrasound?     Fetal ultrasound is a test that lets your doctor see an image of your baby. Your doctor learns information about your baby from this picture. You may find out, for example, if you are having a boy or a girl. But the main reason you have this test is to get information about your baby's health.  (You may hear your baby called a fetus. This is a common medical term for a baby that's growing in the mother's uterus.)  What kind of information can you learn from this test?  The findings of an ultrasound fall into two categories, normal and abnormal.  Normal  The fetus is the right size for its age.  The placenta is the expected size and does not cover the cervix.  There is enough amniotic fluid in the uterus.  No birth defects can be seen.  Abnormal  The fetus is small or large for its age.  The placenta covers the cervix.  There is too much or too little amniotic fluid in the " uterus.  The fetus may have a birth defect.  What does an abnormal result mean?  Abnormal seems to imply that something is wrong with your baby. But what it means is that the test has shown something the doctor wants to take a closer look at.  And that's what happens next. Your doctor will talk to you about what further test or tests you may need.  What do the results mean?  Some of the things your doctor may see on an abnormal ultrasound include:  Echogenic bowel.  The bowel looks very bright on the screen. This could mean that there's blood in the bowel. Or it could mean that something is blocking the small bowel.  Increased nuchal translucency.  The ultrasound measures the thickness at the back of the baby's neck. An increase in thickness is sometimes an early sign of Down syndrome.  Increased or decreased amniotic fluid.  The doctor will look for a reason for the level of amniotic fluid and will watch the pregnancy closely as it progresses.  Large ventricles.  Ventricles in the brain look larger than they should. Your doctor may take a closer look at the brain.  Renal pyelectasis/hydronephrosis.  The ultrasound measures the fluid around the kidney. If there is more fluid than expected, there is a chance of urinary tract or kidney problems.  Short long bones.  The ultrasound measures certain arm and leg bones. A long bone (humerus or femur) that is shorter than average could be a sign of Down syndrome.  Subchorionic hemorrhage.  An ultrasound can show bleeding under one of the membranes that surrounds the fetus. Some women don't have symptoms of bleeding. The ultrasound can find this problem when women are not bleeding from their vagina. Women who have this condition have a slightly higher chance of miscarriage.  What do you do now?  Take a deep breath, and let it out. Keep in mind that an abnormal finding on an ultrasound, after it's coupled with more information, may:  Turn out to be nothing.  Turn out to be  "something mild that won't affect the baby.  Turn out to be something more serious. But if this happens, early diagnosis helps you and your doctor plan treatment options sooner rather than later.  Your medical team is there for you. So are your family and friends. Ask questions, and get the help and support you need.  Follow-up care is a key part of your treatment and safety. Be sure to make and go to all appointments, and call your doctor if you are having problems. It's also a good idea to know your test results and keep a list of the medicines you take.  Where can you learn more?  Go to https://www.[employer].net/patiented  Enter K451 in the search box to learn more about \"Learning About Fetal Ultrasound Results.\"  Current as of: July 10, 2023               Content Version: 14.0    9870-9325 [employer].   Care instructions adapted under license by your healthcare professional. If you have questions about a medical condition or this instruction, always ask your healthcare professional. [employer] disclaims any warranty or liability for your use of this information.      Learning About Prenatal Visits  Overview     Regular prenatal visits are very important during any pregnancy. These quick office visits may seem simple and routine. But they can help you have a safe and healthy pregnancy. Your doctor is watching for problems that can only be found through regular checkups. The visits also give you and your doctor time to build a good relationship.  After your first visit, you will most likely start on a schedule of monthly visits. In your third trimester, the visits will get more frequent. Based on your health, your age, and if you've had a normal, full-term pregnancy before, your doctor may want to see you more or less often.  At different times in your pregnancy, you will have exams and tests. Some are routine. Others are done only when there is a chance of a problem. Everything healthy " you do for your body helps you have a healthy pregnancy. Rest when you need it. Eat well, drink plenty of water, and exercise regularly.  What happens during a prenatal visit?  You will have blood pressure checks, along with urine tests. You also may have blood tests. If you need to go to the bathroom while waiting for the doctor, tell the nurse. You will be given a sample cup so your urine can be tested.  You will be weighed and have your belly measured.  Your doctor may listen to the fetal heartbeat with a special device.  At about 24 weeks, and possibly earlier in your pregnancy, your doctor will check your blood sugar (glucose tolerance test) for diabetes that can occur during pregnancy. This is gestational diabetes, which can be harmful.  You will have tests to check for infections that could harm your . These include group B streptococcus and hepatitis B.  Your doctor may do ultrasounds to check for problems. This also checks the position of the fetus. An ultrasound uses sound waves to produce a picture of the fetus.  You may get your vaccines updated.  Your doctor may ask you questions to check for signs of anxiety or depression. Tell your doctor if you feel sad, anxious, or hopeless for more than a few days.  You may have other tests at any time during your pregnancy.  Use your visits to discuss with your doctor any concerns you have.  How can you care for yourself at home?  Get plenty of rest.  Try to exercise every day, if your doctor says it is okay. If you have not exercised in the past, start out slowly. For example, you can take short walks each day.  Choose healthy foods, such as fruits, vegetables, whole grains, lean proteins, low-fat dairy, and healthy fats.  Drink plenty of fluids. Cut down on drinks with caffeine, such as coffee, tea, and cola. If you have kidney, heart, or liver disease and have to limit fluids, talk with your doctor before you increase the amount of fluids you drink.  Try  "to avoid chemical fumes, paint fumes, and poisons.  If you smoke, vape, or use alcohol, marijuana, or other drugs, quit or cut back as much as you can. Talk to your doctor if you need help quitting.  Review all of your medicines, including over-the-counter medicines and supplements, with your doctor. Some of your routine medicines may need to be changed. Do not stop or start taking any medicines without talking to your doctor first.  Follow-up care is a key part of your treatment and safety. Be sure to make and go to all appointments, and call your doctor if you are having problems. It's also a good idea to know your test results and keep a list of the medicines you take.  Where can you learn more?  Go to https://www.[employer].net/patiented  Enter J502 in the search box to learn more about \"Learning About Prenatal Visits.\"  Current as of: July 10, 2023               Content Version: 14.0    3592-6780 [employer].   Care instructions adapted under license by your healthcare professional. If you have questions about a medical condition or this instruction, always ask your healthcare professional. [employer] disclaims any warranty or liability for your use of this information.      Intimate Partner Violence: Care Instructions  Overview     If you want to save this information but don't think it is safe to take it home, see if a trusted friend can keep it for you. Plan ahead. Know who you can call for help, and memorize the phone number.   Be careful online too. Your online activity may be seen by others. Do not use your personal computer or device to read about this topic. Use a safe computer, such as one at work, a friend's home, or a library.    Intimate partner violence--a type of domestic abuse--is different from an argument now and then. It is a pattern of abuse that one person may use to control another person's behavior. It may start with threats and name-calling. Then, it may lead to " more serious acts, like pushing and slapping. The abuse also may occur in other areas. For example, the abuser may withhold money or spend a partner's money without their knowledge.  Abuse can cause serious harm. You are more likely to have a long-term health problem from the injuries and stress of living in a violent relationship. People who are sexually abused by their partners have more sexually transmitted infections and unplanned pregnancies. Anyone who is abused also faces emotional pain. Anyone can be abused in relationships. In some relationships, both people use abusive behavior.  If you are pregnant, abuse can cause problems such as poor weight gain, infections, and bleeding. Abuse during this time may increase your baby's risk of low birth weight, premature birth, and death.  Follow-up care is a key part of your treatment and safety. Be sure to make and go to all appointments, and call your doctor if you are having problems. It's also a good idea to know your test results and keep a list of the medicines you take.  How can you care for yourself at home?  If you do not have a safe place to stay, discuss this with your doctor before you leave.  Have a plan for where to go, how to leave your home, and where to stay in case of an emergency. Do not tell your partner about your plan. Contact:  The National Domestic Violence Hotline toll-free at 1-286.934.6705. They can help you find resources in your area.  Your local police department, hospital, or clinic for information about shelters and safe homes near you.  Talk to a trusted friend or neighbor, a counselor, or a shaka leader. Do not feel that you have to hide what happened.  Teach your children how to call for help in an emergency.  Be alert to warning signs, such as threats, heavy alcohol use, or drug use. This can help you avoid danger.  If you can, make sure that there are no guns or other weapons in your home.  When should you call for help?   Call 119  "anytime you think you may need emergency care. For example, call if:    You or someone else has just been abused.     You think you or someone else is in danger of being abused.   Watch closely for changes in your health, and be sure to contact your doctor if you have any problems.  Where can you learn more?  Go to https://www.[employer].net/patiented  Enter G282 in the search box to learn more about \"Intimate Partner Violence: Care Instructions.\"  Current as of: June 24, 2023               Content Version: 14.0    5462-1010 [employer].   Care instructions adapted under license by your healthcare professional. If you have questions about a medical condition or this instruction, always ask your healthcare professional. [employer] disclaims any warranty or liability for your use of this information.      Intimate Partner Violence Safety Instructions: Care Instructions  Overview     If you want to save this information but don't think it is safe to take it home, see if a trusted friend can keep it for you. Plan ahead. Know who you can call for help, and memorize the phone number.   Be careful online too. Your online activity may be seen by others. Do not use your personal computer or device to read about this topic. Use a safe computer, such as one at work, a friend's home, or a library.    When you are abused by a spouse or partner, you can take actions to protect yourself and your children.  You can increase your safety whether you decide to stay with your spouse or partner or you decide to leave. You may want to make a safety plan and pack a bag ahead of time. This will help you leave quickly when you decide to. Remember, you cannot change your partner's actions, but you can find help for you and your children. No one deserves to be abused.  Follow-up care is a key part of your treatment and safety. Be sure to make and go to all appointments, and call your doctor if you are having " problems. It's also a good idea to know your test results and keep a list of the medicines you take.  How can you care for yourself at home?  Make a plan for your safety   If you decide to stay with your abusive spouse or partner, you can do the following to increase your safety:  Decide what works best to keep you safe in an emergency.  Know who you can call to help you in an emergency.  Decide if you will call the police if you get hurt again. If you can, agree on a signal with your children or neighbor to call the police for you if you need help. You can flash lights or hang something out of a window.  Choose a safe place to go for a short time if you need to leave home. Memorize the address and phone number.  Learn escape routes out of your home in case you need to leave in a hurry. Teach your children different ways to get out of your home quickly if they need to.  If you can, hide or lock up things that can be used as weapons (guns, knives, hammers).  Learn the number of a domestic violence shelter. Talk to the people there about how they can help.  Find out about other community resources that can help you.  Take pictures of bruises or other injuries if you can. You can also take pictures of things your abuser has broken.  Teach your children that violence is never okay. Tell them that they do not deserve to be hurt.  Pack a bag   Prepare a bag with things you will need if you leave suddenly. Leave it with a friend, a relative, or someone else you trust. You could include the following items in the bag:  A set of keys to your home and car.  Emergency phone numbers and addresses.  Money such as cash or checks. You can also ask a friend, a relative, or someone else you trust to hold money for you.  Copies of legal documents such as house and car titles or rent receipts, birth certificates, Social Security card, voter registration, marriage and 's licenses, and your children's health records.  Personal  "items you would need for a few days, such as clothes, a toothbrush, toothpaste, and any medicines you or your children need.  A favorite toy or book for your child or children.  Diapers and bottles, if you have very young children.  Pictures that show signs of abuse and violence. You may also add pictures of your abuser.  If you leave   If you decide to leave, you can take the following steps:  Go to the emergency room at a hospital if you have been hurt.  Think about asking the police to be with you as you leave. They can protect you as you leave your home.  If you decide to leave secretly, remember that activities can be tracked. Your abuser may still have access to your cell phone, email, and credit cards. It may be possible for these to be traced. Always be aware of your surroundings.  If this is an emergency, do not worry about gathering up anything. Just leave--your safety is most important.  If your abuser moves out, change the locks on the doors. If you have a security system, change the access code.  When should you call for help?   Call 911 anytime you think you may need emergency care. For example, call if:    You or someone else has just been abused.     You think you or someone else is in danger of being abused.   Watch closely for changes in your health, and be sure to contact your doctor if you have any problems.  Where can you learn more?  Go to https://www.[employer].net/patiented  Enter A752 in the search box to learn more about \"Intimate Partner Violence Safety Instructions: Care Instructions.\"  Current as of: June 24, 2023               Content Version: 14.0    9003-6527 [email or web address].   Care instructions adapted under license by your healthcare professional. If you have questions about a medical condition or this instruction, always ask your healthcare professional. [employer] disclaims any warranty or liability for your use of this information.      Learning About " Intimate Partner Violence  What is intimate partner violence?  Intimate partner violence is a type of domestic abuse. It's threatening, emotionally harmful, or violent behavior in a personal relationship. It can happen between past or current partners or spouses. In some relationships both people abuse each other. One partner may be more abusive. Or the abuse may be equal.  Abuse can affect people of any ethnic group, race, or Religious. It can affect teens, adults, or the elderly. And it can happen to people of any sexual orientation, gender, or social status.  Abusers use fear, bullying, and threats to control their partners. They may control what their partners do. They may control where their partners go or who they see. They may act jealous, controlling, or possessive. These early signs of abuse may happen soon after the start of the relationship. Sometimes it can be hard to notice abuse at first. But after the relationship becomes more serious, the abuse may get worse.  If you are being abused in your relationship, it's important to get help. The abuse is not your fault. You don't have to face it alone.  Be careful  It may not be safe to take home domestic abuse information like this handout. Some people ask a trusted friend to keep it for them. It's also important to plan ahead and to memorize the phone number of places you can go for help. If you are concerned about your safety, do not use your computer, smartphone, or tablet to read about domestic abuse.   What are the types of intimate partner violence?  Abuse can happen in different ways. Each type can happen on its own or in combination with others.  Emotional abuse  Emotional abuse is a pattern of threats, insults, or controlling behavior. It includes verbal abuse. It goes beyond healthy disagreements in a relationship. It's a sign of an unhealthy relationship.  Do you feel threatened, intimidated, or controlled?  Does your partner:  Threaten your  children, other family members, or pets?  Use jokes meant to embarrass or shame you?  Call you names?  Tell you that you are a bad parent?  Threaten to take away your children?  Threaten to have you or your family members deported?  Control your access to money or other basic needs?  Control what you do, who you see or talk to, or where you go?  Another form of emotional abuse is denying that it is happening. Or the abuser may act like the abuse is no big deal or is your fault.  Sexual abuse  With sexual abuse, abusers may try to convince or force you to have sex. They may force you into sex acts you're not comfortable with. Or they may sexually assault you. Sexual abuse can happen even if you are in a committed relationship.  Physical abuse  Physical abuse means that a partner hits, kicks, or does something else to physically hurt you. Physical abuse that starts with a slap might lead to kicking, shoving, and choking over time. The abuser may also threaten to hurt or kill you.  Stalking  Stalking means that an abuser gives you attention that you do not want and that causes you fear. Examples of stalking include:  Following you.  Showing up at places where the abuser isn't invited, such as at your work or school.  Constantly calling or texting you.  What problems can  to?  Intimate partner violence can be very dangerous. It can cause serious, repeated injury. It can even lead to death.  All forms of abuse can cause long-term health problems from the stress of a violent relationship. Verbal abuse can lead to sexual and physical abuse.  Abuse causes:  Emotional pain.  Depression.  Anxiety.  Post-traumatic stress.  Sexual abuse can lead to sexually transmitted infections (such as HIV/AIDS) and unplanned pregnancy.  Pregnancy can be a very dangerous time for people in abusive relationships. Abuse can cause or increase the risk of problems during pregnancy. These include low weight gain, anemia, infections, and  "bleeding. Abuse may also increase your baby's risk of low birth weight, premature birth, and death.  It can be hard for some victims of abuse to ask for help or to leave their relationship. You may feel scared, stuck, or not sure what steps to take. But it's important not to ignore abuse. Talking to someone you trust could be the first step to ending the abuse and taking care of your own health and happiness again. There are resources available that can help keep you safe.  Where can you get help?  Talk to a trusted friend. Find a local advocacy group, or talk to your doctor about the abuse.  Contact the National Domestic Violence Hotline at 2-115-896-ETSV (1-607.877.8668) for more safety tips. They can guide you to groups in your area that can help. Or go to the National Coalition Against Domestic Violence website at www.thehotlLoad [employer].org to learn more.  Domestic violence groups or a counselor in your area can help you make a safety plan for yourself and your children.  When to call for help  Call 911 anytime you think you may need emergency care. For example, call if:  You think that you or someone you know is in danger of being abused.  You have been hurt and can't have someone safely take you to emergency care.  You have just been abused.  A family member has just been abused.  Where can you learn more?  Go to https://www.[employer].net/patiented  Enter S665 in the search box to learn more about \"Learning About Intimate Partner Violence.\"  Current as of: June 24, 2023               Content Version: 14.0    4359-8716 [employer].   Care instructions adapted under license by your healthcare professional. If you have questions about a medical condition or this instruction, always ask your healthcare professional. [employer] disclaims any warranty or liability for your use of this information.      Vaginal Bleeding During Pregnancy: Care Instructions  Overview     It's common to have some " vaginal spotting when you are pregnant. In some cases, the bleeding isn't serious. And there aren't any more problems with the pregnancy.  But sometimes bleeding is a sign of a more serious problem. This is more common if the bleeding is heavy or painful. Examples of more serious problems include miscarriage, an ectopic pregnancy, and a problem with the placenta.  You may have to see your doctor again to be sure everything is okay. You may also need more tests to find the cause of the bleeding.  Home treatment may be all you need. But it depends on what is causing the bleeding. Be sure to tell your doctor if you have any new symptoms or if your symptoms get worse.  The doctor has checked you carefully, but problems can develop later. If you notice any problems or new symptoms, get medical treatment right away.  Follow-up care is a key part of your treatment and safety. Be sure to make and go to all appointments, and call your doctor if you are having problems. It's also a good idea to know your test results and keep a list of the medicines you take.  How can you care for yourself at home?  If your doctor prescribed medicines, take them exactly as directed. Call your doctor if you think you are having a problem with your medicine.  Do not have vaginal sex until your doctor says it's okay.  Do not put anything in your vagina until your doctor says it's okay.  Ask your doctor about other activities you can or can't do.  Get a lot of rest. Being pregnant can make you tired.  Do not use nonsteroidal anti-inflammatory drugs (NSAIDs), such as ibuprofen (Advil, Motrin), naproxen (Aleve), or aspirin, unless your doctor says it is okay.  When should you call for help?   Call 911 anytime you think you may need emergency care. For example, call if:    You passed out (lost consciousness).     You have severe vaginal bleeding. This means you are soaking through a pad each hour for 2 or more hours.     You have sudden, severe pain  "in your belly or pelvis.   Call your doctor now or seek immediate medical care if:    You have new or worse vaginal bleeding.     You are dizzy or lightheaded, or you feel like you may faint.     You have pain in your belly, pelvis, or lower back.     You think that you are in labor.     You have a sudden release of fluid from your vagina.     You've been having regular contractions for an hour. This means that you've had at least 8 contractions within 1 hour or at least 4 contractions within 20 minutes, even after you change your position and drink fluids.     You notice that your baby has stopped moving or is moving much less than normal.   Watch closely for changes in your health, and be sure to contact your doctor if you have any problems.  Where can you learn more?  Go to https://www.[employer].net/patiented  Enter N829 in the search box to learn more about \"Vaginal Bleeding During Pregnancy: Care Instructions.\"  Current as of: July 10, 2023               Content Version: 14.0    1104-6896 [employer].   Care instructions adapted under license by your healthcare professional. If you have questions about a medical condition or this instruction, always ask your healthcare professional. [employer] disclaims any warranty or liability for your use of this information.      "

## 2024-05-10 ENCOUNTER — TRANSCRIBE ORDERS (OUTPATIENT)
Dept: ULTRASOUND IMAGING | Facility: CLINIC | Age: 34
End: 2024-05-10
Payer: COMMERCIAL

## 2024-05-10 DIAGNOSIS — O26.90 PREGNANCY RELATED CONDITION, ANTEPARTUM: Primary | ICD-10-CM

## 2024-05-13 ENCOUNTER — MYC MEDICAL ADVICE (OUTPATIENT)
Dept: FAMILY MEDICINE | Facility: CLINIC | Age: 34
End: 2024-05-13
Payer: COMMERCIAL

## 2024-05-21 ENCOUNTER — MYC MEDICAL ADVICE (OUTPATIENT)
Dept: FAMILY MEDICINE | Facility: CLINIC | Age: 34
End: 2024-05-21
Payer: COMMERCIAL

## 2024-05-22 ENCOUNTER — NURSE TRIAGE (OUTPATIENT)
Dept: FAMILY MEDICINE | Facility: CLINIC | Age: 34
End: 2024-05-22

## 2024-05-22 ENCOUNTER — VIRTUAL VISIT (OUTPATIENT)
Dept: FAMILY MEDICINE | Facility: CLINIC | Age: 34
End: 2024-05-22
Payer: COMMERCIAL

## 2024-05-22 DIAGNOSIS — G44.209 TENSION HEADACHE: Primary | ICD-10-CM

## 2024-05-22 PROCEDURE — 99442 PR PHYSICIAN TELEPHONE EVALUATION 11-20 MIN: CPT | Performed by: FAMILY MEDICINE

## 2024-05-22 RX ORDER — PREDNISONE 20 MG/1
20 TABLET ORAL DAILY
Qty: 5 TABLET | Refills: 0 | Status: SHIPPED | OUTPATIENT
Start: 2024-05-22 | End: 2024-05-27

## 2024-05-22 ASSESSMENT — ENCOUNTER SYMPTOMS: HEADACHES: 1

## 2024-05-22 NOTE — PROGRESS NOTES
"    Instructions Relayed to Patient by Virtual Roomer:     Patient is active on [employer]:   Relayed following to patient: \"It looks like you are active on [employer], are you able to join the visit this way? If not, do you need us to send you a link now or would you like your provider to send a link via text or email when they are ready to initiate the visit?\"      Patient Confirmed they will join visit via: Provider to call patient for telephone visit   Reminded patient to ensure they were logged on to virtual visit by arrival time listed.   Asked if patient has flexibility to initiate visit sooner than arrival time: patient is unable to initiate visit earlier than arrival time     If pediatric virtual visit, ensured pediatric patient along with parent/guardian will be present for video visit.     Patient offered the website [email or web address] and/or phone number to [employer] Help line: [phone]    Wyatt is a 34 year old who is being evaluated via a billable telephone visit.    What phone number would you like to be contacted at? [phone]  How would you like to obtain your AVS? [employer]  Originating Location (pt. Location): Home    Distant Location (provider location):  On-site    Assessment & Plan       ICD-10-CM    1. Tension headache  G44.209 REVIEW OF HEALTH MAINTENANCE PROTOCOL ORDERS     predniSONE (DELTASONE) 20 MG tablet           Patient who is currently 10 weeks pregnant calls in with an ongoing headache over the past 3 days.  Seems to come and go.  She has neck pain and right frontal and right side of her head pain.  No visual disturbance.  She has a history of tension headaches that feel similar.  Normally she takes 1-2 ibuprofen in the headache resolves but she was warned not to take ibuprofen in pregnancy.  I explained the very small risk of NSAIDs in the first trimester, especially if just 1 or 2 days is taken.  However long-term, I think she needs physical therapy to start " "craniosacral therapy to help her address her underlying neck issues.  She agrees.  If the ibuprofen does not work she may start a course of prednisone.    No follow-ups on file.    Sylvester Gallardo MD  [facility] is a 34 year old, presenting for the following health issues:  Headache        5/22/2024     1:14 PM   Additional Questions   Roomed by brissa   Accompanied by self     History of Present Illness       Headaches:   Since the patient's last clinic visit, headaches are: worsened  The patient is getting headaches:  Headache lasting 3 days - off and on  She is not able to do normal daily activities when she has a migraine.  The patient is taking the following rescue/relief medications:  Tylenol and other   Patient states \"I get no relief\" from the rescue/relief medications.   The patient is taking the following medications to prevent migraines:  No medications to prevent migraines  In the past 4 weeks, the patient has gone to an Urgent Care or Emergency Room 0 times times due to headaches.                Review of Systems  Constitutional, HEENT, cardiovascular, pulmonary, gi and gu systems are negative, except as otherwise noted.      Objective           Vitals:  No vitals were obtained today due to virtual visit.    Physical Exam   General: Alert and no distress //Respiratory: No audible wheeze, cough, or shortness of breath // Psychiatric:  Appropriate affect, tone, and pace of words            Phone call duration: 15 minutes  Signed Electronically by: Sylvester Gallardo MD    "

## 2024-05-22 NOTE — TELEPHONE ENCOUNTER
Message handled by Nurse Triage with [facility] - provider name: Sami .    Provider would like a phone visit with patient today at 2pm.    Patient notified.  Appointment made.  Arabella Clifton RN on 5/22/2024 at 12:26 PM

## 2024-05-22 NOTE — TELEPHONE ENCOUNTER
Patient sent a mychart regarding headaches.    Patient has had a headache for 3 days - constant.  Pain level is from 7-10.  She has tried tylenol and this does not help.    Please advise.    Arabella Clifton RN on 5/22/2024 at 11:43 AM        Reason for Disposition   Pregnant less than 20 weeks and SEVERE headache (e.g., excruciating) and not improved after 2 hours of pain medicine    Additional Information   Negative: Difficult to awaken or acting confused (e.g., disoriented, slurred speech)   Negative: Weakness of the face, arm or leg on one side of the body and new-onset   Negative: Numbness of the face, arm or leg on one side of the body and new-onset   Negative: Loss of speech or garbled speech and new-onset   Negative: Passed out (i.e., fainted, collapsed and was not responding)   Negative: Sounds like a life-threatening emergency to the triager   Negative: Followed a head injury within last 3 days   Negative: Traumatic Brain Injury (TBI) is suspected   Negative: Sinus pain of forehead and yellow or green nasal discharge   Negative: Unable to walk without falling   Negative: Stiff neck (can't touch chin to chest)   Negative: Possibility of carbon monoxide exposure   Negative: SEVERE headache (e.g., excruciating) and having contractions or other symptoms of labor   Negative: Pregnant 20 or more weeks with Systolic BP >= 140 OR Diastolic BP >= 90   Negative: Pregnant 20 or more weeks and SEVERE headache (e.g., excruciating) that is not improved after 2 hours of pain medicine   Negative: Pregnant 20 or more weeks and new hand or face swelling   Negative: Pregnant 20 or more weeks and new blurred vision or vision changes   Negative: Pregnant 20 or more weeks and sudden weight gain (i.e., more than 3 lbs or 1.4 kg in one week)   Negative: Pregnant 20 or more weeks and upper abdomen pain   Negative: Pregnant 23 or more weeks and baby is moving less today (e.g., kick count < 5 in 1 hour or < 10 in 2 hours)   Negative:  SEVERE headache and 'worst headache' of life   Negative: SEVERE headache, sudden-onset (i.e., reaching maximum intensity within seconds to 1 hour)   Negative: Severe pain in one eye   Negative: Loss of vision or double vision  (Exception: Similar to previous migraines.)   Negative: Patient sounds very sick or weak to the triager   Negative: Fever > 103 F (39.4 C)    Protocols used: Pregnancy - Headache-A-[place]

## 2024-05-25 ENCOUNTER — HEALTH MAINTENANCE LETTER (OUTPATIENT)
Age: 34
End: 2024-05-25

## 2024-06-06 ENCOUNTER — PRENATAL OFFICE VISIT (OUTPATIENT)
Dept: FAMILY MEDICINE | Facility: CLINIC | Age: 34
End: 2024-06-06
Payer: COMMERCIAL

## 2024-06-06 VITALS
OXYGEN SATURATION: 96 % | TEMPERATURE: 98.4 F | WEIGHT: 207.2 LBS | DIASTOLIC BLOOD PRESSURE: 70 MMHG | SYSTOLIC BLOOD PRESSURE: 98 MMHG | RESPIRATION RATE: 18 BRPM | HEIGHT: 65 IN | BODY MASS INDEX: 34.52 KG/M2 | HEART RATE: 93 BPM

## 2024-06-06 DIAGNOSIS — O09.90 HIGH-RISK PREGNANCY SUPERVISION, UNSPECIFIED TRIMESTER: Primary | ICD-10-CM

## 2024-06-06 PROCEDURE — 36415 COLL VENOUS BLD VENIPUNCTURE: CPT | Performed by: FAMILY MEDICINE

## 2024-06-06 PROCEDURE — 99207 PR PRENATAL VISIT: CPT | Performed by: FAMILY MEDICINE

## 2024-06-06 ASSESSMENT — PAIN SCALES - GENERAL: PAINLEVEL: NO PAIN (0)

## 2024-06-06 NOTE — PATIENT INSTRUCTIONS
Weeks 10 to 14 of Your Pregnancy: Care Instructions  It's now possible to hear the fetus's heartbeat with a special ultrasound device. And the fetus's organs are developing.    Decide about tests to check for birth defects. Think about your age, your chance of passing on a family disease, your need to know about any problems, and what you might do after you have the test results.    It's okay to exercise. Try activities such as walking or swimming. Check with your doctor before starting a new program.    You may feel more tired than usual.  Taking naps during the day may help.     You may feel emotional.  It might help to talk to someone.     You may have headaches.  Try lying down and putting a cool cloth over your forehead.     You can use acetaminophen (Tylenol) for pain relief.  Don't take any anti-inflammatory medicines (such as Advil, Motrin, Aleve), unless your doctor says it's okay.     You may feel a fullness or aching in your lower belly.  This can feel like the kind of cramps you might get before a period. A back rub may help.     You may need to urinate more.  Your growing uterus and changing hormones can affect your bladder.     You may feel sick to your stomach (morning sickness).  Try avoiding food and smells that make you feel sick.     Your breasts may feel different.  They may feel tender or get bigger. Your nipples may get darker. Try a bra that gives you good support.     Avoid alcohol, tobacco, and drugs (including marijuana).  If you need help quitting, talk to your doctor.     Take a daily prenatal vitamin.  Choose one with folic acid.   Follow-up care is a key part of your treatment and safety. Be sure to make and go to all appointments, and call your doctor if you are having problems. It's also a good idea to know your test results and keep a list of the medicines you take.  Where can you learn more?  Go to https://www.healthwise.net/patiented  Enter E090 in the search box to learn more  "about \"Weeks 10 to 14 of Your Pregnancy: Care Instructions.\"  Current as of: July 10, 2023               Content Version: 14.0    5479-9172 [employer].   Care instructions adapted under license by your healthcare professional. If you have questions about a medical condition or this instruction, always ask your healthcare professional. [employer] disclaims any warranty or liability for your use of this information.        Nutrition During Pregnancy: Care Instructions  Overview     Healthy eating when you are pregnant is important for you and your baby. It can help you feel well and have a successful pregnancy and delivery. During pregnancy your nutrition needs increase. Even if you have excellent eating habits, your doctor may recommend a multivitamin to make sure you get enough iron and folic acid.  You may wonder how much weight you should gain. In general, if you were at a healthy weight before you became pregnant, then you should gain between 25 and 35 pounds. If you were overweight before pregnancy, then you'll likely be advised to gain 15 to 25 pounds. If you were underweight before pregnancy, then you'll probably be advised to gain 28 to 40 pounds. Your doctor will work with you to set a weight goal that is right for you. Gaining a healthy amount of weight helps you have a healthy baby.  Follow-up care is a key part of your treatment and safety. Be sure to make and go to all appointments, and call your doctor if you are having problems. It's also a good idea to know your test results and keep a list of the medicines you take.  How can you care for yourself at home?  Eat plenty of fruits and vegetables. Include a variety of orange, yellow, and leafy dark-green vegetables every day.  Choose whole-grain bread, cereal, and pasta. Good choices include whole wheat bread, whole wheat pasta, brown rice, and oatmeal.  Get 4 or more servings of milk and milk products each day. Good choices " include nonfat or low-fat milk, yogurt, and cheese. If you cannot eat milk products, you can get calcium from calcium-fortified products such as orange juice, soy milk, and tofu. Other non-milk sources of calcium include leafy green vegetables, such as broccoli, kale, mustard greens, turnip greens, bok bere, and brussels sprouts.  If you eat meat, pick lower-fat types. Good choices include lean cuts of meat and chicken or turkey without the skin.  Avoid fish that are high in mercury. These include shark, swordfish, tom mackerel, marlin, orange roughy, and bigeye tuna, as well as tilefish from the [facility].  It's okay to eat up to 8 to 12 ounces a week of fish that are low in mercury or up to 4 ounces a week of fish that have medium levels of mercury. Some fish that are low in mercury are salmon, shrimp, canned light tuna, cod, and tilapia. Some fish that have medium levels of mercury are halibut and white albacore tuna.  For more advice about eating fish, you can visit the U.S. Food and Drug Administration (FDA) or U.S. Environmental Protection Agency (EPA) website.  Heat lunch meats (such as turkey, ham, or bologna) to 165 F before you eat them. This reduces your risk of getting sick from a kind of bacteria that can be found in lunch meats.  Do not eat unpasteurized soft cheeses, such as brie, feta, fresh mozzarella, and blue cheese. They have a bacteria that could harm your baby.  Limit caffeine to about 200 to 300 mg per day. On average, a cup of brewed coffee has around 80 to 100 mg of caffeine.  Do not drink any alcohol. No amount of alcohol has been found to be safe during pregnancy.  Do not diet or try to lose weight. For example, do not follow a low-carbohydrate diet. If you are overweight at the start of your pregnancy, your doctor will work with you to manage your weight gain.  Tell your doctor about all vitamins and supplements you take.  When should you call for help?  Watch closely for changes in  "your health, and be sure to contact your doctor if you have any problems.  Where can you learn more?  Go to https://www.[employer].net/patiented  Enter Y785 in the search box to learn more about \"Nutrition During Pregnancy: Care Instructions.\"  Current as of: September 20, 2023               Content Version: 14.0    7939-9301 [employer].   Care instructions adapted under license by your healthcare professional. If you have questions about a medical condition or this instruction, always ask your healthcare professional. [employer] disclaims any warranty or liability for your use of this information.      Exercise During Pregnancy: Care Instructions  Overview     Exercise is good for you during a healthy pregnancy. It can relieve back pain, swelling, and other discomforts. It also prepares your muscles for childbirth. And exercise can improve your energy level and help you sleep better.  If your doctor advises it, get more exercise. For example, walking is a good choice. Bit by bit, increase the amount you walk every day. Try for at least 30 minutes on most days of the week. You could also try a prenatal exercise class. But if you do not already exercise, be sure to talk with your doctor before you start a new exercise program. Doctors do not recommend contact sports during pregnancy.  Follow-up care is a key part of your treatment and safety. Be sure to make and go to all appointments, and call your doctor if you are having problems. It's also a good idea to know your test results and keep a list of the medicines you take.  How can you care for yourself at home?  Talk with your doctor about the right kind of exercise for each stage of pregnancy.  Listen to your body to know if your exercise is at a safe level.  Do not become overheated while you exercise. High body temperature can be harmful. Avoid activities that can make your body too hot.  If you feel tired, take it easy. You might walk " "instead of run.  If you are used to strenuous exercise, ask your doctor how to know when it's time to slow down.  If you exercised before getting pregnant, you should be able to keep up your routine early in your pregnancy. Later in your pregnancy, you may want to switch to more gentle activities.  Drink plenty of fluids before, during, and after exercise.  Avoid contact sports, such as soccer and basketball. Also avoid risky activities. These include scuba diving, horseback riding, and exercising at a high altitude (above 6,000 feet). If you live in a place with a high altitude, talk to your doctor about how you can exercise safely.  Do not get overtired while you exercise. You should be able to talk while you work out.  After your fourth month of pregnancy, avoid exercises that require you to lie flat on your back on a hard surface. These include sit-ups and some yoga poses.  Get plenty of rest. You may be very tired while you are pregnant.  Where can you learn more?  Go to https://www.[employer].net/patiented  Enter S801 in the search box to learn more about \"Exercise During Pregnancy: Care Instructions.\"  Current as of: July 10, 2023               Content Version: 14.0    5259-2665 [employer].   Care instructions adapted under license by your healthcare professional. If you have questions about a medical condition or this instruction, always ask your healthcare professional. [employer] disclaims any warranty or liability for your use of this information.      Learning About Pregnancy and Obesity  How does your weight affect your pregnancy?     The basics of prenatal care are the same for everyone, regardless of size. You'll get what you need to have a healthy baby.  But your size can make a difference in a few things. You and your doctor will have to watch your pregnancy weight. Your weight may affect your labor and delivery.  You may have some extra doctor visits and tests. And you " "may have some tests earlier in your pregnancy. You'll need to pay close attention to things like blood pressure and the chance of getting gestational diabetes. (This is a type of diabetes that sometimes happens during pregnancy.) And close attention will be given to your developing baby.  Work with your doctor to get the care you need. Go to all your doctor visits, and follow your doctor's advice about what to do and what to avoid during pregnancy.  How much weight gain is healthy?  If you are very overweight (obese), experts recommend that you gain between 11 and 20 pounds. Your doctor will work with you to set a weight goal that's right for you. In some cases, your doctor may recommend that you not gain any weight.  How much extra food do you need to eat?  Although you may joke that you're \"eating for two\" during pregnancy, you don't need to eat twice as much food. How much you can eat depends on:  Your height.  How much you weigh when you get pregnant.  How active you are.  If you're carrying more than one fetus (multiple pregnancy).  In the first trimester, you'll probably need the same amount of calories as you did before you were pregnant. In general, in your second trimester, you need to eat about 340 extra calories a day. In your third trimester, you need to eat about 450 extra calories a day.  What can you do to have a healthy pregnancy?  The best things you can do for you and your baby are to eat healthy foods, get regular exercise, avoid alcohol and smoking, and go to your doctor visits.  Eat a variety of foods from all the food groups. Make sure to get enough calcium and folic acid.  You may want to work with a dietitian to help you plan healthy meals to get the right amount of calories for you.  If you didn't exercise much before you got pregnant, talk to your doctor about how you can slowly get more active. Your doctor may want to set up an exercise program with you.  Where can you learn more?  Go to " "https://www."[employer].net/patiented  Enter B644 in the search box to learn more about \"Learning About Pregnancy and Obesity.\"  Current as of: July 10, 2023               Content Version: 14.0    4037-5453 [employer].   Care instructions adapted under license by your healthcare professional. If you have questions about a medical condition or this instruction, always ask your healthcare professional. [employer] disclaims any warranty or liability for your use of this information.      You have been provided the CDC Warning Signs in Pregnancy document.    Additional copies can be found here: www.[employer].com/711927.pdf  "

## 2024-06-06 NOTE — PROGRESS NOTES
Concerns:    Doing well.  No concerns today.  Discussed anenatal screening.  She declines testing at this time.  Will schedule anatomy ultrasound.  RTC 4 weeks.        Pregnancy Issues              1. Surrogacy pregnancy via [facility]. male couple from [place] adopting. Vag progesterone till 13w. Level 2 @ 20w              2. Hist of HTN, DM, PPH with retained placenta. Advised LDA at 13w-term              3. Precipitous deliveries   4. O blood type                      Prenatal care plan              - prior deliveries:  term x 3  -OB Labs of concern: none              - Rh- pos                 - First trimester screening:  Discussed completed              - Second trimester screening:  Discussed completed              - Labor pain management:                - Ped:  ?              - Circumcision if boy:               - Birth control:               - Breast feeding:  No              - Covid 19 vaccine:  Not Done              - influenza vaccine: Not Done              - Tdap Done              - RSV Not Done        Sylvester Gallardo MD

## 2024-06-13 LAB — SCANNED LAB RESULT: NORMAL

## 2024-07-02 ENCOUNTER — PRENATAL OFFICE VISIT (OUTPATIENT)
Dept: FAMILY MEDICINE | Facility: CLINIC | Age: 34
End: 2024-07-02
Payer: COMMERCIAL

## 2024-07-02 VITALS
DIASTOLIC BLOOD PRESSURE: 70 MMHG | OXYGEN SATURATION: 100 % | SYSTOLIC BLOOD PRESSURE: 102 MMHG | TEMPERATURE: 98 F | HEIGHT: 65 IN | WEIGHT: 214 LBS | HEART RATE: 89 BPM | BODY MASS INDEX: 35.65 KG/M2

## 2024-07-02 DIAGNOSIS — Z33.3 SURROGATE PREGNANCY: Primary | ICD-10-CM

## 2024-07-02 PROCEDURE — 99207 PR PRENATAL VISIT: CPT | Performed by: FAMILY MEDICINE

## 2024-07-02 ASSESSMENT — PAIN SCALES - GENERAL: PAINLEVEL: NO PAIN (0)

## 2024-07-02 NOTE — PROGRESS NOTES
Concerns:    Doing well.  No concerns today.  Will schedule anatomy ultrasound.  RTC 4 weeks.      Pregnancy Issues   1. Surrogacy pregnancy via [facility]. male couple from [place] adopting. Vag progesterone till 13w. Level 2 @ 20w   2. Hist of HTN, DM, PPH with retained placenta   3. Precipitous deliveries          Prenatal care plan              - prior deliveries:  term x 3  -OB Labs of concern: none   - Rh-                 - First trimester screening:  Discussed completed              - Second trimester screening:  Discussed completed              - Labor pain management:                - Ped:  ?              - Circumcision if boy:               - Birth control:               - Breast feeding:  No              - Covid 19 vaccine:  Not Done              - influenza vaccine: Not Done              - Tdap Done   - RSV Not Done        Sylvester Gallardo MD

## 2024-07-09 ENCOUNTER — MYC MEDICAL ADVICE (OUTPATIENT)
Dept: FAMILY MEDICINE | Facility: CLINIC | Age: 34
End: 2024-07-09
Payer: COMMERCIAL

## 2024-07-09 DIAGNOSIS — O09.90 HIGH-RISK PREGNANCY SUPERVISION, UNSPECIFIED TRIMESTER: Primary | ICD-10-CM

## 2024-07-10 NOTE — TELEPHONE ENCOUNTER
Routed message to provider to continue with conversation.     Routed to provider for review and approval/denial aspirin.     Sybil Armenta RN on 7/10/2024 at 7:13 AM

## 2024-07-11 ENCOUNTER — PRE VISIT (OUTPATIENT)
Dept: MATERNAL FETAL MEDICINE | Facility: CLINIC | Age: 34
End: 2024-07-11
Payer: COMMERCIAL

## 2024-07-11 RX ORDER — ASPIRIN 81 MG/1
81 TABLET ORAL DAILY
Qty: 90 TABLET | Refills: 3 | Status: SHIPPED | OUTPATIENT
Start: 2024-07-11

## 2024-07-18 ENCOUNTER — OFFICE VISIT (OUTPATIENT)
Dept: MATERNAL FETAL MEDICINE | Facility: CLINIC | Age: 34
End: 2024-07-18
Attending: OBSTETRICS & GYNECOLOGY
Payer: COMMERCIAL

## 2024-07-18 ENCOUNTER — HOSPITAL ENCOUNTER (OUTPATIENT)
Dept: ULTRASOUND IMAGING | Facility: CLINIC | Age: 34
Discharge: HOME | End: 2024-07-18
Attending: OBSTETRICS & GYNECOLOGY
Payer: COMMERCIAL

## 2024-07-18 DIAGNOSIS — O26.90 PREGNANCY RELATED CONDITION, ANTEPARTUM: ICD-10-CM

## 2024-07-18 DIAGNOSIS — O09.812 PREGNANCY RESULTING FROM IN VITRO FERTILIZATION IN SECOND TRIMESTER: ICD-10-CM

## 2024-07-18 DIAGNOSIS — O09.522 MULTIGRAVIDA OF ADVANCED MATERNAL AGE IN SECOND TRIMESTER: Primary | ICD-10-CM

## 2024-07-18 PROCEDURE — 76811 OB US DETAILED SNGL FETUS: CPT

## 2024-07-18 PROCEDURE — 76811 OB US DETAILED SNGL FETUS: CPT | Mod: 26 | Performed by: OBSTETRICS & GYNECOLOGY

## 2024-07-18 PROCEDURE — 76817 TRANSVAGINAL US OBSTETRIC: CPT | Mod: 26 | Performed by: OBSTETRICS & GYNECOLOGY

## 2024-07-18 PROCEDURE — 99203 OFFICE O/P NEW LOW 30 MIN: CPT | Mod: 25 | Performed by: OBSTETRICS & GYNECOLOGY

## 2024-07-18 PROCEDURE — 76817 TRANSVAGINAL US OBSTETRIC: CPT

## 2024-07-18 NOTE — NURSING NOTE
Patient reports positive fetal movement, no pain, no contractions, leaking of fluid, or bleeding.  SBAR given to LINDA JENKINS, see their note in Epic.

## 2024-07-18 NOTE — PROGRESS NOTES
"Please see \"Imaging\" tab under \"Chart Review\" for details of today's visit.    Lorena Gifford    "

## 2024-07-31 ENCOUNTER — PRENATAL OFFICE VISIT (OUTPATIENT)
Dept: FAMILY MEDICINE | Facility: CLINIC | Age: 34
End: 2024-07-31
Payer: COMMERCIAL

## 2024-07-31 VITALS
OXYGEN SATURATION: 98 % | RESPIRATION RATE: 18 BRPM | DIASTOLIC BLOOD PRESSURE: 70 MMHG | HEIGHT: 65 IN | HEART RATE: 82 BPM | WEIGHT: 218.4 LBS | BODY MASS INDEX: 36.39 KG/M2 | SYSTOLIC BLOOD PRESSURE: 110 MMHG | TEMPERATURE: 97.8 F

## 2024-07-31 DIAGNOSIS — O09.90 HIGH-RISK PREGNANCY SUPERVISION, UNSPECIFIED TRIMESTER: Primary | ICD-10-CM

## 2024-07-31 PROCEDURE — 99207 PR PRENATAL VISIT: CPT | Performed by: FAMILY MEDICINE

## 2024-07-31 ASSESSMENT — PAIN SCALES - GENERAL: PAINLEVEL: NO PAIN (0)

## 2024-07-31 NOTE — PATIENT INSTRUCTIONS
"Weeks 18 to 22 of Your Pregnancy: Care Instructions  At this stage you may find that your nausea and fatigue are gone. You may feel better overall and have more energy. But you might now also have some new discomforts, like sleep problems or leg cramps.    You may start to feel your baby move. These movements can feel like butterflies or bubbles.    Babies at this stage can now suck their thumbs.    Get some exercise every day.  And avoid caffeine late in the day.     Take a warm shower or bath before bed.  Try relaxation exercises to calm your mind and body.     Use extra pillows.  They can help you get comfortable.     Don't use sleeping pills or alcohol.  They could harm your baby.     For leg cramps, stretch and apply heat.  A warm bath, leg warmers, a heating pad, or a hot water bottle can help with muscle aches.   Stretches for leg cramps    Straighten your leg and bend your foot (flex your ankle) slowly upward, toward your knee. Bend your toes up and down.    Stand on a flat surface. Stretch your toes upward. For balance, hold on to the wall or something stable. If it feels okay, take small steps walking on your heels.  Follow-up care is a key part of your treatment and safety. Be sure to make and go to all appointments, and call your doctor if you are having problems. It's also a good idea to know your test results and keep a list of the medicines you take.  Where can you learn more?  Go to https://www.[employer].com.net/patiented  Enter W603 in the search box to learn more about \"Weeks 18 to 22 of Your Pregnancy: Care Instructions.\"  Current as of: July 10, 2023               Content Version: 14.0    3805-2048 [employer].   Care instructions adapted under license by your healthcare professional. If you have questions about a medical condition or this instruction, always ask your healthcare professional. [employer] disclaims any warranty or liability for your use of this " "information.      Weeks 22 to 26 of Your Pregnancy: Care Instructions  Your baby's lungs are getting ready for breathing. Your baby may respond to your voice. Your baby likely turns less, and kicks or jerks more. Jerking may mean that your baby has hiccups.    Think about taking childbirth classes. And start to think about whether you want to have pain medicine during labor.    At your next doctor visit, you may be tested for anemia and for high blood sugar that first occurs during pregnancy (gestational diabetes). These conditions can cause problems for you and your baby.    To ease discomfort, such as back pain    Change your position often. Try not to sit or stand for too long.  Get some exercise. Things like walking or stretching may help.  Try using a heating pad or cold pack.    To ease or reduce swelling in your feet, ankles, hands, and fingers    Take off your rings.  Avoid high-sodium foods, such as potato chips.  Prop up your feet, and sleep with pillows under your feet.  Try to avoid standing for long periods of time.  Do not wear tight shoes.  Wear support stockings.  Kegel exercises to prevent urine from leaking    Squeeze your muscles as if you were trying not to pass gas. Your belly, legs, and buttocks shouldn't move. Hold the squeeze for 3 seconds, then relax for 5 to 10 seconds.    Add 1 second each week until you can squeeze for 10 seconds. Repeat the exercise 10 times a session. Do 3 to 8 sessions a day. If these exercises cause you pain, stop doing them and talk with your doctor.  Follow-up care is a key part of your treatment and safety. Be sure to make and go to all appointments, and call your doctor if you are having problems. It's also a good idea to know your test results and keep a list of the medicines you take.  Where can you learn more?  Go to https://www.healthwise.net/patiented  Enter G264 in the search box to learn more about \"Weeks 22 to 26 of Your Pregnancy: Care " "Instructions.\"  Current as of: July 10, 2023               Content Version: 14.0    1376-2678 [employer].   Care instructions adapted under license by your healthcare professional. If you have questions about a medical condition or this instruction, always ask your healthcare professional. [employer] disclaims any warranty or liability for your use of this information.      Round Ligament Pain: Care Instructions  Overview     Round ligament pain is a common pain during pregnancy. You may feel a sharp brief pain on one or both sides of your belly. It may go down into your groin. It's usually felt for the first time during the second trimester. This pain is a normal part of pregnancy.  Your uterus is supported by two ligaments that go from the top and sides of the uterus to the bones of the pelvis. These are the round ligaments. As your uterus grows, these ligaments stretch and tighten with your movements. This may be the cause of the pain. You may find that certain activities seem to cause pain. If you can, avoid those activities.  Your doctor can usually diagnose round ligament pain from your symptoms and an exam. If you have bleeding or other symptoms, your doctor may also do an imaging test, such as an ultrasound. Your doctor may suggest some things that can help the pain, such as rest and strengthening exercises.  Follow-up care is a key part of your treatment and safety. Be sure to make and go to all appointments, and call your doctor if you are having problems. It's also a good idea to know your test results and keep a list of the medicines you take.  How can you care for yourself at home?  If certain movements seem to trigger belly pain, see if you can avoid them or try moving more slowly so the ligaments don't stretch quickly.  Stay active. If your doctor says it's okay, try moderate exercise. You might try things like swimming, walking, or stretching. Ask your doctor about " "strengthening and stretching exercises that may help.  Try a heating pad or cold pack on the area. A warm bath or shower may also help.  Rest when you can.  Ask your doctor about taking acetaminophen for pain. Be safe with medicines. Read and follow all instructions on the label.  Try a belly support band. Some people find that these can help.  When should you call for help?   Call your doctor now or seek immediate medical care if:    You think you might be in labor.     You have new or worse pain.   Watch closely for changes in your health, and be sure to contact your doctor if you have any problems.  Where can you learn more?  Go to https://www.[employer]inte[employer].net/patiented  Enter R110 in the search box to learn more about \"Round Ligament Pain: Care Instructions.\"  Current as of: July 10, 2023  Content Version: 14.1    3901-0154 [employer].   Care instructions adapted under license by your healthcare professional. If you have questions about a medical condition or this instruction, always ask your healthcare professional. [employer] disclaims any warranty or liability for your use of this information.    Leg and Ankle Edema: Care Instructions  Your Care Instructions  Swelling in the legs, ankles, and feet is called edema. It is common after you sit or stand for a while. Long plane flights or car rides often cause swelling in the legs and feet. You may also have swelling if you have to stand for long periods of time at your job. Problems with the veins in the legs (varicose veins) and changes in hormones can also cause swelling. Sometimes the swelling in the ankles and feet is caused by a more serious problem, such as heart failure, infection, blood clots, or liver or kidney disease.  Follow-up care is a key part of your treatment and safety. Be sure to make and go to all appointments, and call your doctor if you are having problems. It's also a good idea to know your test results and keep a " "list of the medicines you take.  How can you care for yourself at home?  If your doctor gave you medicine, take it as prescribed. Call your doctor if you think you are having a problem with your medicine.  Whenever you are resting, raise your legs up. Try to keep the swollen area higher than the level of your heart.  Take breaks from standing or sitting in one position.  Walk around to increase the blood flow in your lower legs.  Move your feet and ankles often while you stand, or tighten and relax your leg muscles.  Wear support stockings. Put them on in the morning, before swelling gets worse.  Eat a balanced diet. Lose weight if you need to.  Limit the amount of salt (sodium) in your diet. Salt holds fluid in the body and may increase swelling.  When should you call for help?   Call 911 anytime you think you may need emergency care. For example, call if:    You have symptoms of a blood clot in your lung (called a pulmonary embolism). These may include:  Sudden chest pain.  Trouble breathing.  Coughing up blood.   Call your doctor now or seek immediate medical care if:    You have signs of a blood clot, such as:  Pain in your calf, back of the knee, thigh, or groin.  Redness and swelling in your leg or groin.     You have symptoms of infection, such as:  Increased pain, swelling, warmth, or redness.  Red streaks or pus.  A fever.   Watch closely for changes in your health, and be sure to contact your doctor if:    Your swelling is getting worse.     You have new or worsening pain in your legs.     You do not get better as expected.   Where can you learn more?  Go to https://www.[employer].net/patiented  Enter N696 in the search box to learn more about \"Leg and Ankle Edema: Care Instructions.\"  Current as of: August 6, 2023               Content Version: 14.0    6816-0271 [employer].   Care instructions adapted under license by your healthcare professional. If you have questions about a medical " condition or this instruction, always ask your healthcare professional. [employer] disclaims any warranty or liability for your use of this information.      Back Pain During Pregnancy: Care Instructions  Overview     Back pain has many possible causes. It is often caused by problems with muscles and ligaments in your back. The extra weight during pregnancy can put stress on your back. Moving, lifting, standing, sitting, or sleeping in an awkward way also can strain your back. Back pain can also be a sign of labor. Although it may hurt a lot, back pain often improves on its own. Use good home treatment, and take care not to stress your back.  Follow-up care is a key part of your treatment and safety. Be sure to make and go to all appointments, and call your doctor if you are having problems. It's also a good idea to know your test results and keep a list of the medicines you take.  How can you care for yourself at home?  Ask your doctor about taking acetaminophen (Tylenol) for pain. Do not take aspirin, ibuprofen (Advil, Motrin), or naproxen (Aleve).  Do not take two or more pain medicines at the same time unless the doctor told you to. Many pain medicines have acetaminophen, which is Tylenol. Too much acetaminophen (Tylenol) can be harmful.  Try heat or ice, whichever feels better. Apply it for 10 to 20 minutes at a time, several times a day. Put a thin cloth between the heat or ice and your skin. A warm bath or shower may also help.  Lie on your left side with your knees and hips bent and a pillow between your legs. This reduces stress on your back.  Try to avoid standing or sitting for too long or heavy lifting. If your job requires lots of standing, sitting, or heavy lifting, ask your employer if you can take short breaks or adjust your work activity. You can ask your doctor to write a note requesting these breaks or other adjustments.  Wear supportive, low-heeled shoes. Avoid flat or high-heeled  "shoes.  Try a belly support band. Supporting your belly can take the strain off your back.  Ask your doctor about how much exercise you can do. Regular exercise such as swimming, water aerobics, walking, or stretching can help with back pain.  Ask your doctor about exercises to stretch, strengthen, and relax your muscles. Your doctor may recommend physical therapy.  When should you call for help?   Call your doctor now or seek immediate medical care if:    You've been having regular contractions for an hour. This means that you've had at least 6 contractions within 1 hour, even after you change your position and drink fluids.     You have new numbness in your buttocks, genital or rectal areas, or legs.     You have a new loss of bowel or bladder control.     You have symptoms of a urinary tract infection. These may include:  Pain or burning when you urinate.  A frequent need to urinate without being able to pass much urine.  Pain in the flank, which is just below the rib cage and above the waist on either side of the back.  Blood in your urine.   Watch closely for changes in your health, and be sure to contact your doctor if:    You do not get better as expected.   Where can you learn more?  Go to https://www.[employer].net/patiented  Enter C696 in the search box to learn more about \"Back Pain During Pregnancy: Care Instructions.\"  Current as of: July 10, 2023               Content Version: 14.0    6490-6022 [employer].   Care instructions adapted under license by your healthcare professional. If you have questions about a medical condition or this instruction, always ask your healthcare professional. [employer] disclaims any warranty or liability for your use of this information.       Labor: Care Instructions  Overview      labor is the start of labor between 20 and 36 weeks of pregnancy. Most babies are born at 37 to 42 weeks of pregnancy. In labor, the uterus contracts " to open the cervix. This is the first stage of childbirth.  labor can be caused by a problem with the baby, the mother, or both. Often the cause is not known.  In some cases, doctors use medicines to try to delay labor until 34 or more weeks of pregnancy. By this time, a baby has grown enough so that problems are not likely. In some cases--such as with a serious infection--it is healthier for the baby to be born early. Your treatment will depend on how far along you are in your pregnancy and on your health and your baby's health.  Follow-up care is a key part of your treatment and safety. Be sure to make and go to all appointments, and call your doctor if you are having problems. It's also a good idea to know your test results and keep a list of the medicines you take.  How can you care for yourself at home?  If your doctor prescribed medicines, take them exactly as directed. Call your doctor if you think you are having a problem with your medicine.  Rest until your doctor advises you about activity.  Do not have sexual intercourse unless your doctor says it is safe.  Use sanitary pads if you have vaginal bleeding. Using pads makes it easier to monitor your bleeding.  Do not smoke or allow others to smoke around you. If you need help quitting, talk to your doctor about stop-smoking programs and medicines. These can increase your chances of quitting for good.  When should you call for help?   Call 911  anytime you think you may need emergency care. For example, call if:    You passed out (lost consciousness).     You have a seizure.     You have severe vaginal bleeding.     You have severe pain in your belly or pelvis that doesn't get better between contractions.     You have had fluid gushing or leaking from your vagina and you know or think the umbilical cord is bulging into your vagina. If this happens, immediately get down on your knees so your rear end (buttocks) is higher than your head. This will  "decrease the pressure on the cord until help arrives.   Call your doctor now or seek immediate medical care if:    You have signs of preeclampsia, such as:  Sudden swelling of your face, hands, or feet.  New vision problems (such as dimness, blurring, or seeing spots).  A severe headache.     You have any vaginal bleeding.     You have belly pain or cramping.     You have a fever.     You have had regular contractions (with or without pain) for an hour. This means that you have 6 or more within 1 hour after you change your position and drink fluids.     You have a sudden release of fluid from the vagina.     You have low back pain or pelvic pressure that does not go away.     You notice that your baby has stopped moving or is moving much less than normal.   Watch closely for changes in your health, and be sure to contact your doctor if you have any problems.  Where can you learn more?  Go to https://www.[employer]/patiented  Enter Q400 in the search box to learn more about \" Labor: Care Instructions.\"  Current as of: July 10, 2023               Content Version: 14.0    9947-1104 [employer].   Care instructions adapted under license by your healthcare professional. If you have questions about a medical condition or this instruction, always ask your healthcare professional. [employer] disclaims any warranty or liability for your use of this information.      Weeks 18 to 22 of Your Pregnancy: Care Instructions  At this stage you may find that your nausea and fatigue are gone. You may feel better overall and have more energy. But you might now also have some new discomforts, like sleep problems or leg cramps.    You may start to feel your baby move. These movements can feel like butterflies or bubbles.    Babies at this stage can now suck their thumbs.    Get some exercise every day.  And avoid caffeine late in the day.     Take a warm shower or bath before bed.  Try relaxation " "exercises to calm your mind and body.     Use extra pillows.  They can help you get comfortable.     Don't use sleeping pills or alcohol.  They could harm your baby.     For leg cramps, stretch and apply heat.  A warm bath, leg warmers, a heating pad, or a hot water bottle can help with muscle aches.   Stretches for leg cramps    Straighten your leg and bend your foot (flex your ankle) slowly upward, toward your knee. Bend your toes up and down.    Stand on a flat surface. Stretch your toes upward. For balance, hold on to the wall or something stable. If it feels okay, take small steps walking on your heels.  Follow-up care is a key part of your treatment and safety. Be sure to make and go to all appointments, and call your doctor if you are having problems. It's also a good idea to know your test results and keep a list of the medicines you take.  Where can you learn more?  Go to https://www.[employer].net/patiented  Enter W603 in the search box to learn more about \"Weeks 18 to 22 of Your Pregnancy: Care Instructions.\"  Current as of: July 10, 2023               Content Version: 14.0    8944-8301 [employer].   Care instructions adapted under license by your healthcare professional. If you have questions about a medical condition or this instruction, always ask your healthcare professional. [employer] disclaims any warranty or liability for your use of this information.      Weeks 22 to 26 of Your Pregnancy: Care Instructions  Your baby's lungs are getting ready for breathing. Your baby may respond to your voice. Your baby likely turns less, and kicks or jerks more. Jerking may mean that your baby has hiccups.    Think about taking childbirth classes. And start to think about whether you want to have pain medicine during labor.    At your next doctor visit, you may be tested for anemia and for high blood sugar that first occurs during pregnancy (gestational diabetes). These conditions can " "cause problems for you and your baby.    To ease discomfort, such as back pain    Change your position often. Try not to sit or stand for too long.  Get some exercise. Things like walking or stretching may help.  Try using a heating pad or cold pack.    To ease or reduce swelling in your feet, ankles, hands, and fingers    Take off your rings.  Avoid high-sodium foods, such as potato chips.  Prop up your feet, and sleep with pillows under your feet.  Try to avoid standing for long periods of time.  Do not wear tight shoes.  Wear support stockings.  Kegel exercises to prevent urine from leaking    Squeeze your muscles as if you were trying not to pass gas. Your belly, legs, and buttocks shouldn't move. Hold the squeeze for 3 seconds, then relax for 5 to 10 seconds.    Add 1 second each week until you can squeeze for 10 seconds. Repeat the exercise 10 times a session. Do 3 to 8 sessions a day. If these exercises cause you pain, stop doing them and talk with your doctor.  Follow-up care is a key part of your treatment and safety. Be sure to make and go to all appointments, and call your doctor if you are having problems. It's also a good idea to know your test results and keep a list of the medicines you take.  Where can you learn more?  Go to https://www.[email or web address].net/patiented  Enter G264 in the search box to learn more about \"Weeks 22 to 26 of Your Pregnancy: Care Instructions.\"  Current as of: July 10, 2023               Content Version: 14.0    2521-6381 [employer].   Care instructions adapted under license by your healthcare professional. If you have questions about a medical condition or this instruction, always ask your healthcare professional. [employer] disclaims any warranty or liability for your use of this information.      Round Ligament Pain: Care Instructions  Overview     Round ligament pain is a common pain during pregnancy. You may feel a sharp brief pain on one or both " sides of your belly. It may go down into your groin. It's usually felt for the first time during the second trimester. This pain is a normal part of pregnancy.  Your uterus is supported by two ligaments that go from the top and sides of the uterus to the bones of the pelvis. These are the round ligaments. As your uterus grows, these ligaments stretch and tighten with your movements. This may be the cause of the pain. You may find that certain activities seem to cause pain. If you can, avoid those activities.  Your doctor can usually diagnose round ligament pain from your symptoms and an exam. If you have bleeding or other symptoms, your doctor may also do an imaging test, such as an ultrasound. Your doctor may suggest some things that can help the pain, such as rest and strengthening exercises.  Follow-up care is a key part of your treatment and safety. Be sure to make and go to all appointments, and call your doctor if you are having problems. It's also a good idea to know your test results and keep a list of the medicines you take.  How can you care for yourself at home?  If certain movements seem to trigger belly pain, see if you can avoid them or try moving more slowly so the ligaments don't stretch quickly.  Stay active. If your doctor says it's okay, try moderate exercise. You might try things like swimming, walking, or stretching. Ask your doctor about strengthening and stretching exercises that may help.  Try a heating pad or cold pack on the area. A warm bath or shower may also help.  Rest when you can.  Ask your doctor about taking acetaminophen for pain. Be safe with medicines. Read and follow all instructions on the label.  Try a belly support band. Some people find that these can help.  When should you call for help?   Call your doctor now or seek immediate medical care if:    You think you might be in labor.     You have new or worse pain.   Watch closely for changes in your health, and be sure to  "contact your doctor if you have any problems.  Where can you learn more?  Go to https://www.[employer].net/patiented  Enter R110 in the search box to learn more about \"Round Ligament Pain: Care Instructions.\"  Current as of: July 10, 2023  Content Version: 14.1 2006-2024 [employer].   Care instructions adapted under license by your healthcare professional. If you have questions about a medical condition or this instruction, always ask your healthcare professional. [employer] disclaims any warranty or liability for your use of this information.    Leg and Ankle Edema: Care Instructions  Your Care Instructions  Swelling in the legs, ankles, and feet is called edema. It is common after you sit or stand for a while. Long plane flights or car rides often cause swelling in the legs and feet. You may also have swelling if you have to stand for long periods of time at your job. Problems with the veins in the legs (varicose veins) and changes in hormones can also cause swelling. Sometimes the swelling in the ankles and feet is caused by a more serious problem, such as heart failure, infection, blood clots, or liver or kidney disease.  Follow-up care is a key part of your treatment and safety. Be sure to make and go to all appointments, and call your doctor if you are having problems. It's also a good idea to know your test results and keep a list of the medicines you take.  How can you care for yourself at home?  If your doctor gave you medicine, take it as prescribed. Call your doctor if you think you are having a problem with your medicine.  Whenever you are resting, raise your legs up. Try to keep the swollen area higher than the level of your heart.  Take breaks from standing or sitting in one position.  Walk around to increase the blood flow in your lower legs.  Move your feet and ankles often while you stand, or tighten and relax your leg muscles.  Wear support stockings. Put them on in " "the morning, before swelling gets worse.  Eat a balanced diet. Lose weight if you need to.  Limit the amount of salt (sodium) in your diet. Salt holds fluid in the body and may increase swelling.  When should you call for help?   Call 911 anytime you think you may need emergency care. For example, call if:    You have symptoms of a blood clot in your lung (called a pulmonary embolism). These may include:  Sudden chest pain.  Trouble breathing.  Coughing up blood.   Call your doctor now or seek immediate medical care if:    You have signs of a blood clot, such as:  Pain in your calf, back of the knee, thigh, or groin.  Redness and swelling in your leg or groin.     You have symptoms of infection, such as:  Increased pain, swelling, warmth, or redness.  Red streaks or pus.  A fever.   Watch closely for changes in your health, and be sure to contact your doctor if:    Your swelling is getting worse.     You have new or worsening pain in your legs.     You do not get better as expected.   Where can you learn more?  Go to https://www.[employer].net/patiented  Enter N696 in the search box to learn more about \"Leg and Ankle Edema: Care Instructions.\"  Current as of: August 6, 2023               Content Version: 14.0    2096-0715 [employer]   Care instructions adapted under license by your healthcare professional. If you have questions about a medical condition or this instruction, always ask your healthcare professional. [employer] disclaims any warranty or liability for your use of this information.      Back Pain During Pregnancy: Care Instructions  Overview     Back pain has many possible causes. It is often caused by problems with muscles and ligaments in your back. The extra weight during pregnancy can put stress on your back. Moving, lifting, standing, sitting, or sleeping in an awkward way also can strain your back. Back pain can also be a sign of labor. Although it may hurt a lot, back " pain often improves on its own. Use good home treatment, and take care not to stress your back.  Follow-up care is a key part of your treatment and safety. Be sure to make and go to all appointments, and call your doctor if you are having problems. It's also a good idea to know your test results and keep a list of the medicines you take.  How can you care for yourself at home?  Ask your doctor about taking acetaminophen (Tylenol) for pain. Do not take aspirin, ibuprofen (Advil, Motrin), or naproxen (Aleve).  Do not take two or more pain medicines at the same time unless the doctor told you to. Many pain medicines have acetaminophen, which is Tylenol. Too much acetaminophen (Tylenol) can be harmful.  Try heat or ice, whichever feels better. Apply it for 10 to 20 minutes at a time, several times a day. Put a thin cloth between the heat or ice and your skin. A warm bath or shower may also help.  Lie on your left side with your knees and hips bent and a pillow between your legs. This reduces stress on your back.  Try to avoid standing or sitting for too long or heavy lifting. If your job requires lots of standing, sitting, or heavy lifting, ask your employer if you can take short breaks or adjust your work activity. You can ask your doctor to write a note requesting these breaks or other adjustments.  Wear supportive, low-heeled shoes. Avoid flat or high-heeled shoes.  Try a belly support band. Supporting your belly can take the strain off your back.  Ask your doctor about how much exercise you can do. Regular exercise such as swimming, water aerobics, walking, or stretching can help with back pain.  Ask your doctor about exercises to stretch, strengthen, and relax your muscles. Your doctor may recommend physical therapy.  When should you call for help?   Call your doctor now or seek immediate medical care if:    You've been having regular contractions for an hour. This means that you've had at least 6 contractions  "within 1 hour, even after you change your position and drink fluids.     You have new numbness in your buttocks, genital or rectal areas, or legs.     You have a new loss of bowel or bladder control.     You have symptoms of a urinary tract infection. These may include:  Pain or burning when you urinate.  A frequent need to urinate without being able to pass much urine.  Pain in the flank, which is just below the rib cage and above the waist on either side of the back.  Blood in your urine.   Watch closely for changes in your health, and be sure to contact your doctor if:    You do not get better as expected.   Where can you learn more?  Go to https://www.[email or web address].net/patiented  Enter C696 in the search box to learn more about \"Back Pain During Pregnancy: Care Instructions.\"  Current as of: July 10, 2023               Content Version: 14.0    0738-5436 [employer].   Care instructions adapted under license by your healthcare professional. If you have questions about a medical condition or this instruction, always ask your healthcare professional. [employer] disclaims any warranty or liability for your use of this information.       Labor: Care Instructions  Overview      labor is the start of labor between 20 and 36 weeks of pregnancy. Most babies are born at 37 to 42 weeks of pregnancy. In labor, the uterus contracts to open the cervix. This is the first stage of childbirth.  labor can be caused by a problem with the baby, the mother, or both. Often the cause is not known.  In some cases, doctors use medicines to try to delay labor until 34 or more weeks of pregnancy. By this time, a baby has grown enough so that problems are not likely. In some cases--such as with a serious infection--it is healthier for the baby to be born early. Your treatment will depend on how far along you are in your pregnancy and on your health and your baby's health.  Follow-up care is a " key part of your treatment and safety. Be sure to make and go to all appointments, and call your doctor if you are having problems. It's also a good idea to know your test results and keep a list of the medicines you take.  How can you care for yourself at home?  If your doctor prescribed medicines, take them exactly as directed. Call your doctor if you think you are having a problem with your medicine.  Rest until your doctor advises you about activity.  Do not have sexual intercourse unless your doctor says it is safe.  Use sanitary pads if you have vaginal bleeding. Using pads makes it easier to monitor your bleeding.  Do not smoke or allow others to smoke around you. If you need help quitting, talk to your doctor about stop-smoking programs and medicines. These can increase your chances of quitting for good.  When should you call for help?   Call 911  anytime you think you may need emergency care. For example, call if:    You passed out (lost consciousness).     You have a seizure.     You have severe vaginal bleeding.     You have severe pain in your belly or pelvis that doesn't get better between contractions.     You have had fluid gushing or leaking from your vagina and you know or think the umbilical cord is bulging into your vagina. If this happens, immediately get down on your knees so your rear end (buttocks) is higher than your head. This will decrease the pressure on the cord until help arrives.   Call your doctor now or seek immediate medical care if:    You have signs of preeclampsia, such as:  Sudden swelling of your face, hands, or feet.  New vision problems (such as dimness, blurring, or seeing spots).  A severe headache.     You have any vaginal bleeding.     You have belly pain or cramping.     You have a fever.     You have had regular contractions (with or without pain) for an hour. This means that you have 6 or more within 1 hour after you change your position and drink fluids.     You have a  "sudden release of fluid from the vagina.     You have low back pain or pelvic pressure that does not go away.     You notice that your baby has stopped moving or is moving much less than normal.   Watch closely for changes in your health, and be sure to contact your doctor if you have any problems.  Where can you learn more?  Go to https://www.[employer].net/patiented  Enter Q400 in the search box to learn more about \" Labor: Care Instructions.\"  Current as of: July 10, 2023               Content Version: 14.0    7551-5179 [employer].   Care instructions adapted under license by your healthcare professional. If you have questions about a medical condition or this instruction, always ask your healthcare professional. [employer] disclaims any warranty or liability for your use of this information.      "

## 2024-07-31 NOTE — PROGRESS NOTES
Concerns today:   Doing well.  No concerns today.  No nausea/vomiting. No heartburn.  No vaginal bleeding, no contractions/severe cramping, no leakage of fluid.  No vaginal discharge. No dysuria  No headache, vision changes, lower extremity swelling, upper abdominal pain, chest pain, shortness of breath.   Had surrogate family on facetime  Plan for next US at 26 to recheck previa    gnancy Issues              1. Surrogacy pregnancy via [facility]. male couple from [place] adopting. Vag progesterone till 13w. Level 2 @ 20w, 28, 34, growth us              2. Hist of HTN, DM, PPH with retained placenta              3. Precipitous deliveries   4. Posterior previa. Recheck 28w. Sees MFM                      Prenatal care plan              - prior deliveries:  term x 3  -OB Labs of concern: none              - Rh-                  - First trimester screening:  Discussed completed              - Second trimester screening:  Discussed completed              - Labor pain management:                - Ped:  ?              - Circumcision if boy:               - Birth control:               - Breast feeding:  No              - Covid 19 vaccine:  Not Done              - influenza vaccine: Not Done              - Tdap Done              - RSV Not Done        Sylvester Gallardo MD

## 2024-08-05 DIAGNOSIS — O09.812 PREGNANCY RESULTING FROM IN VITRO FERTILIZATION IN SECOND TRIMESTER: Primary | ICD-10-CM

## 2024-08-05 NOTE — PROGRESS NOTES
Patient called MFM to schedule follow up. States primary desires follow up US with MFM. 28w follow up placed.     Lashanda Driver RN

## 2024-08-14 ENCOUNTER — MYC MEDICAL ADVICE (OUTPATIENT)
Dept: FAMILY MEDICINE | Facility: CLINIC | Age: 34
End: 2024-08-14
Payer: COMMERCIAL

## 2024-08-14 NOTE — TELEPHONE ENCOUNTER
She states she only feels like she has a sinus infection.    No fever, no shortness of breath.  Slight cough.    She is already getting better per patient- she just wants provider to know. She does not want paxlovid.    Arabella Clifton RN on 8/14/2024 at 12:46 PM

## 2024-08-16 NOTE — TELEPHONE ENCOUNTER
If the sinus doesn't clear up in the next week, may need antibiotics. Thanks for update, and glad its only mild. Should not affect pregnancy.

## 2024-08-22 ENCOUNTER — HOSPITAL ENCOUNTER (OUTPATIENT)
Dept: ULTRASOUND IMAGING | Facility: CLINIC | Age: 34
Discharge: HOME | End: 2024-08-22
Attending: OBSTETRICS & GYNECOLOGY
Payer: COMMERCIAL

## 2024-08-22 ENCOUNTER — OFFICE VISIT (OUTPATIENT)
Dept: MATERNAL FETAL MEDICINE | Facility: CLINIC | Age: 34
End: 2024-08-22
Attending: OBSTETRICS & GYNECOLOGY
Payer: COMMERCIAL

## 2024-08-22 DIAGNOSIS — O09.812 PREGNANCY RESULTING FROM IN VITRO FERTILIZATION IN SECOND TRIMESTER: Primary | ICD-10-CM

## 2024-08-22 DIAGNOSIS — O09.812 PREGNANCY RESULTING FROM IN VITRO FERTILIZATION IN SECOND TRIMESTER: ICD-10-CM

## 2024-08-22 PROCEDURE — 93325 DOPPLER ECHO COLOR FLOW MAPG: CPT

## 2024-08-22 PROCEDURE — 93325 DOPPLER ECHO COLOR FLOW MAPG: CPT | Mod: 26 | Performed by: OBSTETRICS & GYNECOLOGY

## 2024-08-22 PROCEDURE — 76827 ECHO EXAM OF FETAL HEART: CPT | Mod: 26 | Performed by: OBSTETRICS & GYNECOLOGY

## 2024-08-22 PROCEDURE — 76825 ECHO EXAM OF FETAL HEART: CPT | Mod: 26 | Performed by: OBSTETRICS & GYNECOLOGY

## 2024-08-22 NOTE — PROGRESS NOTES
Please see full imaging report from [facility] program under imaging tab.    Midlred Hsu MD  Maternal Fetal Medicine

## 2024-08-28 ENCOUNTER — PRENATAL OFFICE VISIT (OUTPATIENT)
Dept: FAMILY MEDICINE | Facility: CLINIC | Age: 34
End: 2024-08-28
Payer: COMMERCIAL

## 2024-08-28 VITALS
HEART RATE: 100 BPM | RESPIRATION RATE: 16 BRPM | HEIGHT: 65 IN | DIASTOLIC BLOOD PRESSURE: 72 MMHG | SYSTOLIC BLOOD PRESSURE: 120 MMHG | BODY MASS INDEX: 38.29 KG/M2 | OXYGEN SATURATION: 97 % | TEMPERATURE: 97.8 F | WEIGHT: 229.8 LBS

## 2024-08-28 DIAGNOSIS — O09.90 HIGH-RISK PREGNANCY SUPERVISION, UNSPECIFIED TRIMESTER: Primary | ICD-10-CM

## 2024-08-28 PROCEDURE — 99207 PR PRENATAL VISIT: CPT | Performed by: FAMILY MEDICINE

## 2024-08-28 ASSESSMENT — PAIN SCALES - GENERAL: PAINLEVEL: NO PAIN (0)

## 2024-08-28 NOTE — PATIENT INSTRUCTIONS
"Weeks 22 to 26 of Your Pregnancy: Care Instructions  Your baby's lungs are getting ready for breathing. Your baby may respond to your voice. Your baby likely turns less, and kicks or jerks more. Jerking may mean that your baby has hiccups.    Think about taking childbirth classes. And start to think about whether you want to have pain medicine during labor.    At your next doctor visit, you may be tested for anemia and for high blood sugar that first occurs during pregnancy (gestational diabetes). These conditions can cause problems for you and your baby.    To ease discomfort, such as back pain    Change your position often. Try not to sit or stand for too long.  Get some exercise. Things like walking or stretching may help.  Try using a heating pad or cold pack.    To ease or reduce swelling in your feet, ankles, hands, and fingers    Take off your rings.  Avoid high-sodium foods, such as potato chips.  Prop up your feet, and sleep with pillows under your feet.  Try to avoid standing for long periods of time.  Do not wear tight shoes.  Wear support stockings.  Kegel exercises to prevent urine from leaking    Squeeze your muscles as if you were trying not to pass gas. Your belly, legs, and buttocks shouldn't move. Hold the squeeze for 3 seconds, then relax for 5 to 10 seconds.    Add 1 second each week until you can squeeze for 10 seconds. Repeat the exercise 10 times a session. Do 3 to 8 sessions a day. If these exercises cause you pain, stop doing them and talk with your doctor.  Follow-up care is a key part of your treatment and safety. Be sure to make and go to all appointments, and call your doctor if you are having problems. It's also a good idea to know your test results and keep a list of the medicines you take.  Where can you learn more?  Go to https://www.healthwise.net/patiented  Enter G264 in the search box to learn more about \"Weeks 22 to 26 of Your Pregnancy: Care Instructions.\"  Current as of: July " 10, 2023               Content Version: 14.0    4882-4275 [employer].   Care instructions adapted under license by your healthcare professional. If you have questions about a medical condition or this instruction, always ask your healthcare professional. [employer] disclaims any warranty or liability for your use of this information.      Learning About Screening for Gestational Diabetes  What is gestational diabetes screening?     Screening for gestational diabetes is a way to look for high blood sugar during pregnancy. You drink some very sweet liquid. Then you have a blood test to see how your body uses sugar (glucose).  How is gestational diabetes screening done?  Screening for gestational diabetes may be done in a couple of ways.  Two-part screening.  Part one (glucose challenge test): A blood sample is taken after you drink a liquid that contains sugar (glucose). You don't need to stop eating or drinking before this test. If the test shows that you don't have a lot of sugar in your blood, you don't have gestational diabetes.  Part two (oral glucose tolerance test, or OGTT): If the first test shows a lot of sugar in your blood, then you may have an OGTT. You can't eat or drink for at least 8 hours before this test. A blood sample is taken, then you drink a sweet liquid. You have more blood tests after 1 to 3 hours. If the OGTT shows that you have a lot of sugar in your blood, you may have gestational diabetes.  One-part screening.  Sometimes doctors use the OGTT on its own. If the test shows that you don't have a lot of sugar in your blood, you don't have gestational diabetes. If you do have a lot of sugar in your blood, you may have the condition.  What are the risks of screening?  Your blood glucose level may drop very low toward the end of the test. If this happens, you may feel weak, hungry, and restless. Tell your doctor if you have these symptoms. The test usually will be  "stopped.  You may vomit after drinking the sweet liquid. If this happens, you may need to take the test at a later time.  Your doctor may do more glucose tests at other times during your pregnancy.  Follow-up care is a key part of your treatment and safety. Be sure to make and go to all appointments, and call your doctor if you are having problems. It's also a good idea to know your test results and keep a list of the medicines you take.  Where can you learn more?  Go to https://www.[email or web address].net/patiented  Enter A472 in the search box to learn more about \"Learning About Screening for Gestational Diabetes.\"  Current as of: October 2, 2023               Content Version: 14.0    3818-8855 [employer].   Care instructions adapted under license by your healthcare professional. If you have questions about a medical condition or this instruction, always ask your healthcare professional. [employer], [employer] disclaims any warranty or liability for your use of this information.      "

## 2024-08-28 NOTE — PROGRESS NOTES
Concerns:    Doing well.  No concerns today.  No vaginal bleeding, no contractions, no leakage of fluid  No nausea/vomiting. No heartburn  No vaginal discharge. No dysuria.   No headache, vision changes, lower extremity swelling, upper abdominal pain, chest pain, shortness of breath  Tdap planned next visit  Discussed PTL, PROM, and when to call or come in.  Normal anatomy ultrasound.  RTC 4 weeks.       Pregnancy Issues              1. Surrogacy pregnancy via [facility]. Male couple from [place] adopting. Vag progesterone till 13w. Level 2 @ 20w, 28, 34, growth us. 36w BPPs              2. Hist of HTN, DM, PPH with retained placenta prior pregnancies              3. Precipitous deliveries              4. Posterior previa. Recheck 28w. Sees MFM. Resolved.   5. Abd surgery after accident. Spleen lac. Low abd scarring.                      Prenatal care plan              - prior deliveries:  term x 3  -OB Labs of concern: none              - Rh-                  - First trimester screening:  Discussed completed              - Second trimester screening:  Discussed completed              - Labor pain management:                - Ped:  ?              - Circumcision if boy:               - Birth control:               - Breast feeding:  No              - Covid 19 vaccine:  Not Done              - influenza vaccine: Not Done              - Tdap Done              - RSV Not Done      Sylvester Gallardo MD

## 2024-09-25 ENCOUNTER — HOSPITAL ENCOUNTER (OUTPATIENT)
Dept: ULTRASOUND IMAGING | Facility: CLINIC | Age: 34
Discharge: HOME | End: 2024-09-25
Attending: FAMILY MEDICINE | Admitting: FAMILY MEDICINE
Payer: COMMERCIAL

## 2024-09-25 ENCOUNTER — PRENATAL OFFICE VISIT (OUTPATIENT)
Dept: FAMILY MEDICINE | Facility: CLINIC | Age: 34
End: 2024-09-25
Payer: COMMERCIAL

## 2024-09-25 VITALS
RESPIRATION RATE: 16 BRPM | OXYGEN SATURATION: 98 % | HEART RATE: 110 BPM | SYSTOLIC BLOOD PRESSURE: 98 MMHG | TEMPERATURE: 97.5 F | WEIGHT: 235 LBS | DIASTOLIC BLOOD PRESSURE: 58 MMHG | BODY MASS INDEX: 39.15 KG/M2 | HEIGHT: 65 IN

## 2024-09-25 DIAGNOSIS — O09.90 HIGH-RISK PREGNANCY SUPERVISION, UNSPECIFIED TRIMESTER: Primary | ICD-10-CM

## 2024-09-25 DIAGNOSIS — O09.90 HIGH-RISK PREGNANCY SUPERVISION, UNSPECIFIED TRIMESTER: ICD-10-CM

## 2024-09-25 LAB
GLUCOSE 1H P 50 G GLC PO SERPL-MCNC: 101 MG/DL (ref 70–129)
HGB BLD-MCNC: 11.1 G/DL (ref 11.7–15.7)
T PALLIDUM AB SER QL: NONREACTIVE

## 2024-09-25 PROCEDURE — 90471 IMMUNIZATION ADMIN: CPT | Performed by: FAMILY MEDICINE

## 2024-09-25 PROCEDURE — 76830 TRANSVAGINAL US NON-OB: CPT

## 2024-09-25 PROCEDURE — 99207 PR PRENATAL VISIT: CPT | Performed by: FAMILY MEDICINE

## 2024-09-25 PROCEDURE — 86780 TREPONEMA PALLIDUM: CPT | Performed by: FAMILY MEDICINE

## 2024-09-25 PROCEDURE — 85018 HEMOGLOBIN: CPT | Performed by: FAMILY MEDICINE

## 2024-09-25 PROCEDURE — 90715 TDAP VACCINE 7 YRS/> IM: CPT | Performed by: FAMILY MEDICINE

## 2024-09-25 PROCEDURE — 36415 COLL VENOUS BLD VENIPUNCTURE: CPT | Performed by: FAMILY MEDICINE

## 2024-09-25 PROCEDURE — 82950 GLUCOSE TEST: CPT | Performed by: FAMILY MEDICINE

## 2024-09-25 ASSESSMENT — PAIN SCALES - GENERAL: PAINLEVEL: NO PAIN (0)

## 2024-09-25 NOTE — PROGRESS NOTES
Concerns:   No vaginal bleeding, loss of fluid, or contractions  Tdap given today  Discussed kick counts and fetal movement.  Discussed PTL, PROM, and when to call or come in.  RTC in 2 weeks.    Pregnancy Issues              1. Surrogacy pregnancy via [facility]. Male couple from [place] adopting. Vag progesterone till 13w. Level 2 @ 20w, 28, 34, growth us. 36w BPPs              2. Hist of HTN, DM, PPH with retained placenta prior pregnancies              3. Precipitous deliveries              4. Posterior previa. --> now low lying              5. Abd surgery after accident. Spleen lac. Low abd scarring.   6. Macrosomic. EFW 95%                      Prenatal care plan              - prior deliveries:  term x 3  -OB Labs of concern: none              - Rh-                  - First trimester screening:  Discussed completed              - Second trimester screening:  Discussed completed              - Labor pain management:                - Ped:  ?              - Circumcision if boy:               - Birth control:               - Breast feeding:  No              - Covid 19 vaccine:  Not Done              - influenza vaccine: Not Done              - Tdap Done              - RSV Not Done        Sylvester Gallardo MD

## 2024-09-25 NOTE — PATIENT INSTRUCTIONS
Weeks 26 to 30 of Your Pregnancy: Care Instructions  You're starting your last trimester. You'll probably feel your baby moving around more. Your back may ache as your body gets used to your baby's size and length. Take care of yourself, and pay attention to what your body needs.    Talk to your doctor about getting the Tdap shot. It will help protect your  against whooping cough (pertussis). Also ask your doctor about flu and COVID-19 shots if you haven't had them yet. If your blood type is Rh negative, you may be given a shot of Rh immune globulin (such as RhoGAM). It can help prevent problems for your baby.   You may have [place] contractions. They are single or several strong contractions without a pattern. These are practice contractions but not the start of labor.   Be kind to yourself.       Take breaks when you're tired.  Change positions often. Don't sit for too long or stand for too long.  At work, rest during breaks if you can. If you don't get breaks, talk to your doctor about writing a letter to your employer to request them.  Avoid fumes, chemicals, and tobacco smoke.  Be sexual if you want to.       You may be interested in sex, or you may not. Everyone is different.  Sex is okay unless your doctor tells you not to.  Your belly can make it hard to find good positions for sex. Brave and explore.  Watch for signs of  labor.        These signs include:  Menstrual-like cramps. Or you may have pain or pressure in your pelvis that happens in a pattern.  About 6 or more contractions in an hour (even after rest and a glass of water).  A low, dull backache that doesn't go away when you change positions.  An increase or change in vaginal discharge.  Light vaginal bleeding or spotting.  Your water breaking.  Know what to do if you think you are having contractions.       Drink 1 or 2 glasses of water.  Lie down on your left side for at least an hour.  While on your side, feel the top of  "your belly to see if it's tight.  Write down your contractions for an hour. Time how long it is from the start of one contraction to the start of the next.  Call your doctor if you have regular contractions.  Follow-up care is a key part of your treatment and safety. Be sure to make and go to all appointments, and call your doctor if you are having problems. It's also a good idea to know your test results and keep a list of the medicines you take.  Where can you learn more?  Go to https://www.[employer].net/patiented  Enter S999 in the search box to learn more about \"Weeks 26 to 30 of Your Pregnancy: Care Instructions.\"  Current as of: July 10, 2023  Content Version: 14.1 2006-2024 [employer].   Care instructions adapted under license by your healthcare professional. If you have questions about a medical condition or this instruction, always ask your healthcare professional. [employer] disclaims any warranty or liability for your use of this information.    Counting Your Baby's Kicks: Care Instructions  Overview     Counting your baby's kicks is one way your doctor can tell that your baby is healthy. You will probably feel your baby move for the first time between 16 and 22 weeks. The movement may feel like flutters rather than kicks. Your baby may move more at certain times of the day. When you are active, you may notice less kicking than when you are resting. At your prenatal visits, your doctor will ask whether the baby is active.  In your last trimester, your doctor may ask you to count the number of times you feel your baby move.  Follow-up care is a key part of your treatment and safety. Be sure to make and go to all appointments, and call your doctor if you are having problems. It's also a good idea to know your test results and keep a list of the medicines you take.  How do you count fetal kicks?  A common method of checking your baby's movement is to note the length of time it " "takes to count 10 movements (such as kicks, flutters, or rolls).  Pick your baby's most active time of day to count. This may be any time from morning to evening.  If you don't feel 10 movements in an hour, have something to eat or drink and count for another hour. If you don't feel at least 10 movements in the 2-hour period, call your doctor.  Do not use an at-home Doppler heart monitor in place of counting fetal movements.  When should you call for help?   Call your doctor now or seek immediate medical care if:    You feel fewer than 10 movements in a 2-hour period.     You noticed that your baby has stopped moving or is moving less than normal.   Watch closely for changes in your health, and be sure to contact your doctor if you have any problems.  Where can you learn more?  Go to https://www.[employer].net/patiented  Enter U048 in the search box to learn more about \"Counting Your Baby's Kicks: Care Instructions.\"  Current as of: July 10, 2023               Content Version: 14.0    [id number] [employer].   Care instructions adapted under license by your healthcare professional. If you have questions about a medical condition or this instruction, always ask your healthcare professional. [employer] disclaims any warranty or liability for your use of this information.      "

## 2024-10-22 ENCOUNTER — PRENATAL OFFICE VISIT (OUTPATIENT)
Dept: FAMILY MEDICINE | Facility: CLINIC | Age: 34
End: 2024-10-22
Payer: COMMERCIAL

## 2024-10-22 VITALS
BODY MASS INDEX: 39.24 KG/M2 | OXYGEN SATURATION: 98 % | DIASTOLIC BLOOD PRESSURE: 58 MMHG | HEART RATE: 108 BPM | WEIGHT: 238 LBS | TEMPERATURE: 98.3 F | RESPIRATION RATE: 16 BRPM | SYSTOLIC BLOOD PRESSURE: 118 MMHG

## 2024-10-22 DIAGNOSIS — O09.90 HIGH-RISK PREGNANCY SUPERVISION, UNSPECIFIED TRIMESTER: Primary | ICD-10-CM

## 2024-10-22 PROCEDURE — 90471 IMMUNIZATION ADMIN: CPT | Performed by: FAMILY MEDICINE

## 2024-10-22 PROCEDURE — 99207 PR PRENATAL VISIT: CPT | Performed by: FAMILY MEDICINE

## 2024-10-22 PROCEDURE — 90678 RSV VACC PREF BIVALENT IM: CPT | Performed by: FAMILY MEDICINE

## 2024-10-22 ASSESSMENT — PAIN SCALES - GENERAL: PAINLEVEL: NO PAIN (0)
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
## 2024-10-23 ENCOUNTER — TELEPHONE (OUTPATIENT)
Dept: FAMILY MEDICINE | Facility: CLINIC | Age: 34
End: 2024-10-23
Payer: COMMERCIAL

## 2024-10-23 DIAGNOSIS — O09.90 HIGH-RISK PREGNANCY SUPERVISION, UNSPECIFIED TRIMESTER: Primary | ICD-10-CM

## 2024-10-23 NOTE — TELEPHONE ENCOUNTER
Sylvester Gallardo MD  [facility] Pool2 hours ago (3:39 PM)     RH  done      165.1

## 2024-10-23 NOTE — TELEPHONE ENCOUNTER
Pended BPP- please sign if appropriate and then we can assist with scheduling for patient.     Provider would like BPP with weeks 36, 37, 38, and 39

## 2024-10-28 ENCOUNTER — HOSPITAL ENCOUNTER (OUTPATIENT)
Dept: ULTRASOUND IMAGING | Facility: CLINIC | Age: 34
Discharge: HOME | End: 2024-10-28
Attending: FAMILY MEDICINE | Admitting: FAMILY MEDICINE
Payer: COMMERCIAL

## 2024-10-28 DIAGNOSIS — O09.90 HIGH-RISK PREGNANCY SUPERVISION, UNSPECIFIED TRIMESTER: ICD-10-CM

## 2024-10-28 PROCEDURE — 76816 OB US FOLLOW-UP PER FETUS: CPT

## 2024-11-05 ENCOUNTER — PRENATAL OFFICE VISIT (OUTPATIENT)
Dept: FAMILY MEDICINE | Facility: CLINIC | Age: 34
End: 2024-11-05
Payer: COMMERCIAL

## 2024-11-05 VITALS
WEIGHT: 240 LBS | HEIGHT: 65 IN | SYSTOLIC BLOOD PRESSURE: 122 MMHG | OXYGEN SATURATION: 98 % | HEART RATE: 92 BPM | DIASTOLIC BLOOD PRESSURE: 70 MMHG | BODY MASS INDEX: 39.99 KG/M2 | TEMPERATURE: 97.8 F | RESPIRATION RATE: 18 BRPM

## 2024-11-05 DIAGNOSIS — Z34.80 SUPERVISION OF OTHER NORMAL PREGNANCY, ANTEPARTUM: Primary | ICD-10-CM

## 2024-11-05 PROCEDURE — 99207 PR PRENATAL VISIT: CPT | Performed by: FAMILY MEDICINE

## 2024-11-05 ASSESSMENT — PAIN SCALES - GENERAL: PAINLEVEL_OUTOF10: NO PAIN (0)

## 2024-11-05 NOTE — PATIENT INSTRUCTIONS
Weeks 34 to 36 of Your Pregnancy: Care Instructions  Your belly has grown quite large. It's almost time to give birth! Your baby's lungs are almost ready to breathe air. The skull bones are firm enough to protect your baby's head. But they're soft enough to move down through the birth canal.    You might be wondering what to expect during labor. Because each birth is different, there's no way to know exactly what childbirth will be like for you. Talk to your doctor or midwife about any concerns you have.   You'll probably have a test for group B streptococcus (GBS). GBS is bacteria that can live in the vagina and rectum. GBS can make your baby sick after birth. If you test positive, you'll get antibiotics during labor.     Choose what type of pain relief you would like during labor.  You can choose from a few types, including medicine and non-medicine options. You may want to use several types of pain relief.     Know how medicines can help with pain during labor.  Some medicines lower anxiety and help with some of the pain. Others make your lower body numb so that you will feel less pain.     Tell your doctor about your pain medicine choice.  Do this before you start labor or very early in your labor. You may be able to change your mind during labor.     Learn about the stages of labor.    The first stage includes the early (latent) and active phases of labor. Contractions start in early labor. During active labor, contractions get stronger, last longer, and happen more often. And the cervix opens more rapidly.  The second stage starts when you're ready to push. During this stage, your baby is born.  During the third stage, you'll usually have a few more contractions to push out the placenta.   Follow-up care is a key part of your treatment and safety. Be sure to make and go to all appointments, and call your doctor if you are having problems. It's also a good idea to know your test results and keep a list of the  "medicines you take.  Where can you learn more?  Go to https://www.[employer].net/patiented  Enter B912 in the search box to learn more about \"Weeks 34 to 36 of Your Pregnancy: Care Instructions.\"  Current as of: July 10, 2023  Content Version: 2024 [employer].   Care instructions adapted under license by your healthcare professional. If you have questions about a medical condition or this instruction, always ask your healthcare professional. [employer] disclaims any warranty or liability for your use of this information.    Group B Strep During Pregnancy: Care Instructions  Overview     Group B strep infection is caused by a type of bacteria. It's a different kind of bacteria than the kind that causes strep throat.  You may have this kind of bacteria in your body. Sometimes it may cause an infection, but most of the time it doesn't make you sick or cause symptoms. But if you pass the bacteria to your baby during the birth, it can cause serious health problems for your baby.  If you have this bacteria in your body, you will get antibiotics when you are in labor. Antibiotics help prevent problems for a  baby.  After birth, doctors will watch and may test your baby. If your baby tests positive for Group B strep, your baby will get antibiotics.  If you plan to breastfeed your baby, don't worry. It will be safe to breastfeed.  Follow-up care is a key part of your treatment and safety. Be sure to make and go to all appointments, and call your doctor if you are having problems. It's also a good idea to know your test results and keep a list of the medicines you take.  How can you care for yourself at home?  If your doctor has prescribed antibiotics, take them as directed. Do not stop taking them just because you feel better. You need to take the full course of antibiotics.  Tell your doctor if you are allergic to any antibiotic.  If you go into labor, or your water breaks, go to the " "hospital. Your doctor will give you antibiotics to help protect your baby from infection.  Tell the doctors and nurses if you have an allergy to penicillin.  Tell the doctors and nurses at the hospital that you tested positive for group B strep.  When should you call for help?   Call your doctor now or seek immediate medical care if:    You have symptoms of a urinary tract infection. These may include:  Pain or burning when you urinate.  A frequent need to urinate without being able to pass much urine.  Pain in the flank, which is just below the rib cage and above the waist on either side of the back.  Blood in your urine.  A fever.     You think you are in labor or your water has broken.     You have pain in your belly or pelvis.   Watch closely for changes in your health, and be sure to contact your doctor if you have any problems.  Where can you learn more?  Go to https://www.[employer].net/patiented  Enter M001 in the search box to learn more about \"Group B Strep During Pregnancy: Care Instructions.\"  Current as of: June 12, 2023  Content Version: 14.2 2024 [employer].   Care instructions adapted under license by your healthcare professional. If you have questions about a medical condition or this instruction, always ask your healthcare professional. Healthwise[employer] Incorporated disclaims any warranty or liability for your use of this information.    Circumcision in Infants: What to Expect at Home  Your Child's Recovery  After circumcision, your baby's penis may look red and swollen. It may have petroleum jelly and gauze on it. The gauze will likely come off when your baby urinates. Follow your doctor's directions about whether to put clean gauze back on your baby's penis or to leave the gauze off. If you need to remove gauze from the penis, use warm water to soak the gauze and gently loosen it.  The doctor may have used a Plastibell device to do the circumcision. If so, your baby will have a plastic " ring around the head of the penis. The ring should fall off by itself in 10 to 12 days.  A thin, yellow film may form over the area the day after the procedure. This is part of the normal healing process. It should go away in a few days.  Your baby may seem fussy while the area heals. It may hurt for your baby to urinate. This pain often gets better in 3 or 4 days. But it may last for up to 2 weeks.  Even though your baby's penis will likely start to feel better after 3 or 4 days, it may look worse. The penis often starts to look like it's getting better after about 7 to 10 days.  This care sheet gives you a general idea about how long it will take for your child to recover. But each child recovers at a different pace. Follow the steps below to help your child get better as quickly as possible.  How can you care for your child at home?  Activity    Let your baby rest as much as possible. Sleeping will help with recovery.     You can give your baby a sponge bath the day after surgery. Ask your doctor when it is okay to give your baby a bath.   Medicines    Your doctor will tell you if and when your child can restart any medicines. The doctor will also give you instructions about your child taking any new medicines.     Your doctor may recommend giving your baby acetaminophen (Tylenol) to help with pain after the procedure. Be safe with medicines. Give your child medicines exactly as prescribed. Call your doctor if you think your child is having a problem with a medicine.     Do not give your child two or more pain medicines at the same time unless the doctor told you to. Many pain medicines have acetaminophen, which is Tylenol. Too much acetaminophen (Tylenol) can be harmful.   Circumcision care    Always wash your hands before and after touching the circumcision area.     Gently wash your baby's penis with plain, warm water after each diaper change, and pat it dry. Do not use soap. Don't use hydrogen peroxide or  "alcohol. They can slow healing.     Do not try to remove the film that forms on the penis. The film will go away on its own.     Put plenty of petroleum jelly (such as Vaseline) on the circumcision area during each diaper change. This will prevent your baby's penis from sticking to the diaper while it heals.     Fasten your baby's diapers loosely so that there is less pressure on the penis while it heals.   Follow-up care is a key part of your child's treatment and safety. Be sure to make and go to all appointments, and call your doctor if your child is having problems. It's also a good idea to know your child's test results and keep a list of the medicines your child takes.  When should you call for help?   Call your doctor now or seek immediate medical care if:    Your baby has a fever over 100.4 F.     Your baby is extremely fussy or irritable, has a high-pitched cry, or refuses to eat.     Your baby does not have a wet diaper within 12 hours after the circumcision.     You find a spot of bleeding larger than a 2-inch [place] from the incision.     Your baby has signs of infection. Signs may include severe swelling; redness; a red streak on the shaft of the penis; or a thick, yellow discharge.   Watch closely for changes in your child's health, and be sure to contact your doctor if:    A Plastibell device was used for the circumcision and the ring has not fallen off after 10 to 12 days.   Where can you learn more?  Go to https://www.[employer].net/patiented  Enter S255 in the search box to learn more about \"Circumcision in Infants: What to Expect at Home.\"  Current as of: October 24, 2023  Content Version: 14.2 2024 [employer].   Care instructions adapted under license by your healthcare professional. If you have questions about a medical condition or this instruction, always ask your healthcare professional. Healthwise[employer] Incorporated disclaims any warranty or liability for your use of this " information.
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
## 2024-11-18 ENCOUNTER — HOSPITAL ENCOUNTER (OUTPATIENT)
Dept: ULTRASOUND IMAGING | Facility: CLINIC | Age: 34
Discharge: HOME | End: 2024-11-18
Attending: FAMILY MEDICINE | Admitting: FAMILY MEDICINE
Payer: COMMERCIAL

## 2024-11-18 ENCOUNTER — PRENATAL OFFICE VISIT (OUTPATIENT)
Dept: FAMILY MEDICINE | Facility: CLINIC | Age: 34
End: 2024-11-18
Payer: COMMERCIAL

## 2024-11-18 VITALS
TEMPERATURE: 98.4 F | OXYGEN SATURATION: 97 % | HEART RATE: 107 BPM | RESPIRATION RATE: 21 BRPM | SYSTOLIC BLOOD PRESSURE: 135 MMHG | BODY MASS INDEX: 41.19 KG/M2 | DIASTOLIC BLOOD PRESSURE: 84 MMHG | HEIGHT: 65 IN | WEIGHT: 247.2 LBS

## 2024-11-18 DIAGNOSIS — Z34.90 FUNDAL HEIGHT HIGH FOR DATES: ICD-10-CM

## 2024-11-18 DIAGNOSIS — O09.529 SUPERVISION OF HIGH-RISK PREGNANCY OF ELDERLY MULTIGRAVIDA: Primary | ICD-10-CM

## 2024-11-18 DIAGNOSIS — Z33.3 SURROGATE PREGNANCY: ICD-10-CM

## 2024-11-18 DIAGNOSIS — O09.90 HIGH-RISK PREGNANCY SUPERVISION, UNSPECIFIED TRIMESTER: ICD-10-CM

## 2024-11-18 DIAGNOSIS — O36.60X0 EXCESSIVE FETAL GROWTH, ANTEPARTUM, SINGLE OR UNSPECIFIED FETUS: ICD-10-CM

## 2024-11-18 PROCEDURE — 76819 FETAL BIOPHYS PROFIL W/O NST: CPT

## 2024-11-18 PROCEDURE — 76816 OB US FOLLOW-UP PER FETUS: CPT

## 2024-11-18 PROCEDURE — 87653 STREP B DNA AMP PROBE: CPT | Performed by: STUDENT IN AN ORGANIZED HEALTH CARE EDUCATION/TRAINING PROGRAM

## 2024-11-18 PROCEDURE — 99207 PR PRENATAL VISIT: CPT | Performed by: STUDENT IN AN ORGANIZED HEALTH CARE EDUCATION/TRAINING PROGRAM

## 2024-11-18 ASSESSMENT — PAIN SCALES - GENERAL: PAINLEVEL_OUTOF10: NO PAIN (0)
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
## 2024-11-18 NOTE — PROGRESS NOTES
Concerns: ***  .  {[id number]}  GBS done today.  Labor precautions discussed.  RTC 1 week.  Prenatal flowsheet information is reviewed.    Bev Nicole,

## 2024-11-18 NOTE — PATIENT INSTRUCTIONS
Weeks 34 to 36 of Your Pregnancy: Care Instructions  Your belly has grown quite large. It's almost time to give birth! Your baby's lungs are almost ready to breathe air. The skull bones are firm enough to protect your baby's head. But they're soft enough to move down through the birth canal.    You might be wondering what to expect during labor. Because each birth is different, there's no way to know exactly what childbirth will be like for you. Talk to your doctor or midwife about any concerns you have.   You'll probably have a test for group B streptococcus (GBS). GBS is bacteria that can live in the vagina and rectum. GBS can make your baby sick after birth. If you test positive, you'll get antibiotics during labor.     Choose what type of pain relief you would like during labor.  You can choose from a few types, including medicine and non-medicine options. You may want to use several types of pain relief.     Know how medicines can help with pain during labor.  Some medicines lower anxiety and help with some of the pain. Others make your lower body numb so that you will feel less pain.     Tell your doctor about your pain medicine choice.  Do this before you start labor or very early in your labor. You may be able to change your mind during labor.     Learn about the stages of labor.    The first stage includes the early (latent) and active phases of labor. Contractions start in early labor. During active labor, contractions get stronger, last longer, and happen more often. And the cervix opens more rapidly.  The second stage starts when you're ready to push. During this stage, your baby is born.  During the third stage, you'll usually have a few more contractions to push out the placenta.   Follow-up care is a key part of your treatment and safety. Be sure to make and go to all appointments, and call your doctor if you are having problems. It's also a good idea to know your test results and keep a list of the  "medicines you take.  Where can you learn more?  Go to https://www.[employer].net/patiented  Enter B912 in the search box to learn more about \"Weeks 34 to 36 of Your Pregnancy: Care Instructions.\"  Current as of: July 10, 2023  Content Version: 2024 [employer].   Care instructions adapted under license by your healthcare professional. If you have questions about a medical condition or this instruction, always ask your healthcare professional. Healthwise[employer] Incorporated disclaims any warranty or liability for your use of this information.    Group B Strep During Pregnancy: Care Instructions  Overview     Group B strep infection is caused by a type of bacteria. It's a different kind of bacteria than the kind that causes strep throat.  You may have this kind of bacteria in your body. Sometimes it may cause an infection, but most of the time it doesn't make you sick or cause symptoms. But if you pass the bacteria to your baby during the birth, it can cause serious health problems for your baby.  If you have this bacteria in your body, you will get antibiotics when you are in labor. Antibiotics help prevent problems for a  baby.  After birth, doctors will watch and may test your baby. If your baby tests positive for Group B strep, your baby will get antibiotics.  If you plan to breastfeed your baby, don't worry. It will be safe to breastfeed.  Follow-up care is a key part of your treatment and safety. Be sure to make and go to all appointments, and call your doctor if you are having problems. It's also a good idea to know your test results and keep a list of the medicines you take.  How can you care for yourself at home?  If your doctor has prescribed antibiotics, take them as directed. Do not stop taking them just because you feel better. You need to take the full course of antibiotics.  Tell your doctor if you are allergic to any antibiotic.  If you go into labor, or your water breaks, go to the " "hospital. Your doctor will give you antibiotics to help protect your baby from infection.  Tell the doctors and nurses if you have an allergy to penicillin.  Tell the doctors and nurses at the hospital that you tested positive for group B strep.  When should you call for help?   Call your doctor now or seek immediate medical care if:    You have symptoms of a urinary tract infection. These may include:  Pain or burning when you urinate.  A frequent need to urinate without being able to pass much urine.  Pain in the flank, which is just below the rib cage and above the waist on either side of the back.  Blood in your urine.  A fever.     You think you are in labor or your water has broken.     You have pain in your belly or pelvis.   Watch closely for changes in your health, and be sure to contact your doctor if you have any problems.  Where can you learn more?  Go to https://www.[employer].net/patiented  Enter M001 in the search box to learn more about \"Group B Strep During Pregnancy: Care Instructions.\"  Current as of: June 12, 2023  Content Version: 14.2 2024 [employer].   Care instructions adapted under license by your healthcare professional. If you have questions about a medical condition or this instruction, always ask your healthcare professional. Healthwise, Incorporated disclaims any warranty or liability for your use of this information.    Circumcision in Infants: What to Expect at Home  Your Child's Recovery  After circumcision, your baby's penis may look red and swollen. It may have petroleum jelly and gauze on it. The gauze will likely come off when your baby urinates. Follow your doctor's directions about whether to put clean gauze back on your baby's penis or to leave the gauze off. If you need to remove gauze from the penis, use warm water to soak the gauze and gently loosen it.  The doctor may have used a Plastibell device to do the circumcision. If so, your baby will have a plastic " ring around the head of the penis. The ring should fall off by itself in 10 to 12 days.  A thin, yellow film may form over the area the day after the procedure. This is part of the normal healing process. It should go away in a few days.  Your baby may seem fussy while the area heals. It may hurt for your baby to urinate. This pain often gets better in 3 or 4 days. But it may last for up to 2 weeks.  Even though your baby's penis will likely start to feel better after 3 or 4 days, it may look worse. The penis often starts to look like it's getting better after about 7 to 10 days.  This care sheet gives you a general idea about how long it will take for your child to recover. But each child recovers at a different pace. Follow the steps below to help your child get better as quickly as possible.  How can you care for your child at home?  Activity    Let your baby rest as much as possible. Sleeping will help with recovery.     You can give your baby a sponge bath the day after surgery. Ask your doctor when it is okay to give your baby a bath.   Medicines    Your doctor will tell you if and when your child can restart any medicines. The doctor will also give you instructions about your child taking any new medicines.     Your doctor may recommend giving your baby acetaminophen (Tylenol) to help with pain after the procedure. Be safe with medicines. Give your child medicines exactly as prescribed. Call your doctor if you think your child is having a problem with a medicine.     Do not give your child two or more pain medicines at the same time unless the doctor told you to. Many pain medicines have acetaminophen, which is Tylenol. Too much acetaminophen (Tylenol) can be harmful.   Circumcision care    Always wash your hands before and after touching the circumcision area.     Gently wash your baby's penis with plain, warm water after each diaper change, and pat it dry. Do not use soap. Don't use hydrogen peroxide or  "alcohol. They can slow healing.     Do not try to remove the film that forms on the penis. The film will go away on its own.     Put plenty of petroleum jelly (such as Vaseline) on the circumcision area during each diaper change. This will prevent your baby's penis from sticking to the diaper while it heals.     Fasten your baby's diapers loosely so that there is less pressure on the penis while it heals.   Follow-up care is a key part of your child's treatment and safety. Be sure to make and go to all appointments, and call your doctor if your child is having problems. It's also a good idea to know your child's test results and keep a list of the medicines your child takes.  When should you call for help?   Call your doctor now or seek immediate medical care if:    Your baby has a fever over 100.4 F.     Your baby is extremely fussy or irritable, has a high-pitched cry, or refuses to eat.     Your baby does not have a wet diaper within 12 hours after the circumcision.     You find a spot of bleeding larger than a 2-inch [place] from the incision.     Your baby has signs of infection. Signs may include severe swelling; redness; a red streak on the shaft of the penis; or a thick, yellow discharge.   Watch closely for changes in your child's health, and be sure to contact your doctor if:    A Plastibell device was used for the circumcision and the ring has not fallen off after 10 to 12 days.   Where can you learn more?  Go to https://www.[employer].net/patiented  Enter S255 in the search box to learn more about \"Circumcision in Infants: What to Expect at Home.\"  Current as of: October 24, 2023  Content Version: 14.2 2024 [employer].   Care instructions adapted under license by your healthcare professional. If you have questions about a medical condition or this instruction, always ask your healthcare professional. Healthwise, Incorporated disclaims any warranty or liability for your use of this " information.    Weeks 34 to 36 of Your Pregnancy: Care Instructions  Your belly has grown quite large. It's almost time to give birth! Your baby's lungs are almost ready to breathe air. The skull bones are firm enough to protect your baby's head. But they're soft enough to move down through the birth canal.    You might be wondering what to expect during labor. Because each birth is different, there's no way to know exactly what childbirth will be like for you. Talk to your doctor or midwife about any concerns you have.   You'll probably have a test for group B streptococcus (GBS). GBS is bacteria that can live in the vagina and rectum. GBS can make your baby sick after birth. If you test positive, you'll get antibiotics during labor.     Choose what type of pain relief you would like during labor.  You can choose from a few types, including medicine and non-medicine options. You may want to use several types of pain relief.     Know how medicines can help with pain during labor.  Some medicines lower anxiety and help with some of the pain. Others make your lower body numb so that you will feel less pain.     Tell your doctor about your pain medicine choice.  Do this before you start labor or very early in your labor. You may be able to change your mind during labor.     Learn about the stages of labor.    The first stage includes the early (latent) and active phases of labor. Contractions start in early labor. During active labor, contractions get stronger, last longer, and happen more often. And the cervix opens more rapidly.  The second stage starts when you're ready to push. During this stage, your baby is born.  During the third stage, you'll usually have a few more contractions to push out the placenta.   Follow-up care is a key part of your treatment and safety. Be sure to make and go to all appointments, and call your doctor if you are having problems. It's also a good idea to know your test results and keep  "a list of the medicines you take.  Where can you learn more?  Go to https://www.[employer].net/patiented  Enter B912 in the search box to learn more about \"Weeks 34 to 36 of Your Pregnancy: Care Instructions.\"  Current as of: July 10, 2023  Content Version: 2024 [employer].   Care instructions adapted under license by your healthcare professional. If you have questions about a medical condition or this instruction, always ask your healthcare professional. Healthwise, Incorporated disclaims any warranty or liability for your use of this information.    Group B Strep During Pregnancy: Care Instructions  Overview     Group B strep infection is caused by a type of bacteria. It's a different kind of bacteria than the kind that causes strep throat.  You may have this kind of bacteria in your body. Sometimes it may cause an infection, but most of the time it doesn't make you sick or cause symptoms. But if you pass the bacteria to your baby during the birth, it can cause serious health problems for your baby.  If you have this bacteria in your body, you will get antibiotics when you are in labor. Antibiotics help prevent problems for a  baby.  After birth, doctors will watch and may test your baby. If your baby tests positive for Group B strep, your baby will get antibiotics.  If you plan to breastfeed your baby, don't worry. It will be safe to breastfeed.  Follow-up care is a key part of your treatment and safety. Be sure to make and go to all appointments, and call your doctor if you are having problems. It's also a good idea to know your test results and keep a list of the medicines you take.  How can you care for yourself at home?  If your doctor has prescribed antibiotics, take them as directed. Do not stop taking them just because you feel better. You need to take the full course of antibiotics.  Tell your doctor if you are allergic to any antibiotic.  If you go into labor, or your water " "breaks, go to the hospital. Your doctor will give you antibiotics to help protect your baby from infection.  Tell the doctors and nurses if you have an allergy to penicillin.  Tell the doctors and nurses at the hospital that you tested positive for group B strep.  When should you call for help?   Call your doctor now or seek immediate medical care if:    You have symptoms of a urinary tract infection. These may include:  Pain or burning when you urinate.  A frequent need to urinate without being able to pass much urine.  Pain in the flank, which is just below the rib cage and above the waist on either side of the back.  Blood in your urine.  A fever.     You think you are in labor or your water has broken.     You have pain in your belly or pelvis.   Watch closely for changes in your health, and be sure to contact your doctor if you have any problems.  Where can you learn more?  Go to https://www.[employer]/patiented  Enter M001 in the search box to learn more about \"Group B Strep During Pregnancy: Care Instructions.\"  Current as of: June 12, 2023  Content Version: 14.2    2024 [employer].   Care instructions adapted under license by your healthcare professional. If you have questions about a medical condition or this instruction, always ask your healthcare professional. Healthwise, Incorporated disclaims any warranty or liability for your use of this information.    Circumcision in Infants: What to Expect at Home  Your Child's Recovery  After circumcision, your baby's penis may look red and swollen. It may have petroleum jelly and gauze on it. The gauze will likely come off when your baby urinates. Follow your doctor's directions about whether to put clean gauze back on your baby's penis or to leave the gauze off. If you need to remove gauze from the penis, use warm water to soak the gauze and gently loosen it.  The doctor may have used a Plastibell device to do the circumcision. If so, your baby " will have a plastic ring around the head of the penis. The ring should fall off by itself in 10 to 12 days.  A thin, yellow film may form over the area the day after the procedure. This is part of the normal healing process. It should go away in a few days.  Your baby may seem fussy while the area heals. It may hurt for your baby to urinate. This pain often gets better in 3 or 4 days. But it may last for up to 2 weeks.  Even though your baby's penis will likely start to feel better after 3 or 4 days, it may look worse. The penis often starts to look like it's getting better after about 7 to 10 days.  This care sheet gives you a general idea about how long it will take for your child to recover. But each child recovers at a different pace. Follow the steps below to help your child get better as quickly as possible.  How can you care for your child at home?  Activity    Let your baby rest as much as possible. Sleeping will help with recovery.     You can give your baby a sponge bath the day after surgery. Ask your doctor when it is okay to give your baby a bath.   Medicines    Your doctor will tell you if and when your child can restart any medicines. The doctor will also give you instructions about your child taking any new medicines.     Your doctor may recommend giving your baby acetaminophen (Tylenol) to help with pain after the procedure. Be safe with medicines. Give your child medicines exactly as prescribed. Call your doctor if you think your child is having a problem with a medicine.     Do not give your child two or more pain medicines at the same time unless the doctor told you to. Many pain medicines have acetaminophen, which is Tylenol. Too much acetaminophen (Tylenol) can be harmful.   Circumcision care    Always wash your hands before and after touching the circumcision area.     Gently wash your baby's penis with plain, warm water after each diaper change, and pat it dry. Do not use soap. Don't use  "hydrogen peroxide or alcohol. They can slow healing.     Do not try to remove the film that forms on the penis. The film will go away on its own.     Put plenty of petroleum jelly (such as Vaseline) on the circumcision area during each diaper change. This will prevent your baby's penis from sticking to the diaper while it heals.     Fasten your baby's diapers loosely so that there is less pressure on the penis while it heals.   Follow-up care is a key part of your child's treatment and safety. Be sure to make and go to all appointments, and call your doctor if your child is having problems. It's also a good idea to know your child's test results and keep a list of the medicines your child takes.  When should you call for help?   Call your doctor now or seek immediate medical care if:    Your baby has a fever over 100.4 F.     Your baby is extremely fussy or irritable, has a high-pitched cry, or refuses to eat.     Your baby does not have a wet diaper within 12 hours after the circumcision.     You find a spot of bleeding larger than a 2-inch [place] from the incision.     Your baby has signs of infection. Signs may include severe swelling; redness; a red streak on the shaft of the penis; or a thick, yellow discharge.   Watch closely for changes in your child's health, and be sure to contact your doctor if:    A Plastibell device was used for the circumcision and the ring has not fallen off after 10 to 12 days.   Where can you learn more?  Go to https://www.[employer].net/patiented  Enter S255 in the search box to learn more about \"Circumcision in Infants: What to Expect at Home.\"  Current as of: October 24, 2023  Content Version: 14.2 2024 [facility] [employer].   Care instructions adapted under license by your healthcare professional. If you have questions about a medical condition or this instruction, always ask your healthcare professional. Healthwise, Incorporated disclaims any warranty or liability for your " "use of this information.    Week 37 of Your Pregnancy: Care Instructions    Most babies are born between 37 and 40 weeks.   This is a good time to pack a bag to take with you to the birth. Then it will be ready to go when you are.     Learn about breastfeeding.  For example, find out about ways to hold your baby to make breastfeeding easier. And think about learning how to pump and store milk.     Know that crying is normal.  It's common for babies to cry 1 to 3 hours a day. Some cry more, and some cry less.     Learn why babies cry.  They may be hungry; have gas; need a diaper change; or feel cold, warm, tired, lonely, or tense. Sometimes they cry for unknown reasons.     Think about what will help you stay calm when your baby cries.  Taking slow, deep breaths can help. So can taking a break. It's okay to put your baby somewhere safe (like their crib) and walk away for a few minutes.     Learn about safe sleep for your baby.  Always put your baby to sleep on their back. Place them alone in a crib or bassinet with a firm, flat surface. Keep soft items like stuffed animals out of the crib.     Learn what to expect with  poop.  Your baby will have their own bowel patterns. Some babies have several bowel movements a day. Some have fewer.     Know that  babies will often have loose, yellow bowel movements.  Formula-fed babies have more formed stools. If your baby's poop looks like pellets, your baby is constipated.   Follow-up care is a key part of your treatment and safety. Be sure to make and go to all appointments, and call your doctor if you are having problems. It's also a good idea to know your test results and keep a list of the medicines you take.  Where can you learn more?  Go to https://www.[employer].net/patiented  Enter N257 in the search box to learn more about \"Week 37 of Your Pregnancy: Care Instructions.\"  Current as of: July 10, 2023  Content Version: 2024 [employer]. "   Care instructions adapted under license by your healthcare professional. If you have questions about a medical condition or this instruction, always ask your healthcare professional. Healthwise, Incorporated disclaims any warranty or liability for your use of this information.

## 2024-11-19 LAB — GP B STREP DNA SPEC QL NAA+PROBE: NEGATIVE

## 2024-11-22 ENCOUNTER — HOSPITAL ENCOUNTER (OUTPATIENT)
Facility: CLINIC | Age: 34
Discharge: HOME | End: 2024-11-22
Attending: FAMILY MEDICINE | Admitting: FAMILY MEDICINE
Payer: COMMERCIAL

## 2024-11-22 VITALS
DIASTOLIC BLOOD PRESSURE: 76 MMHG | HEART RATE: 96 BPM | TEMPERATURE: 98.3 F | RESPIRATION RATE: 16 BRPM | SYSTOLIC BLOOD PRESSURE: 137 MMHG

## 2024-11-22 PROBLEM — Z36.89 ENCOUNTER FOR TRIAGE IN PREGNANT PATIENT: Status: ACTIVE | Noted: 2019-12-01

## 2024-11-22 PROCEDURE — G0463 HOSPITAL OUTPT CLINIC VISIT: HCPCS

## 2024-11-22 RX ORDER — LIDOCAINE 40 MG/G
CREAM TOPICAL
Status: DISCONTINUED | OUTPATIENT
Start: 2024-11-22 | End: 2024-11-22 | Stop reason: HOSPADM

## 2024-11-22 ASSESSMENT — ACTIVITIES OF DAILY LIVING (ADL): ADLS_ACUITY_SCORE: 0

## 2024-11-22 NOTE — PROGRESS NOTES
Dr. Hsu was notified of pt's complaint of pelvic pressure, denies leaking of fluid & denies bleeding, VSS, category 1 tracing, cervix was noted to be FT, 60%, -2 at 0845. MD gave orders to be discharge to home & follow up as previously scheduled.

## 2024-11-22 NOTE — DISCHARGE INSTRUCTIONS
OB Triage Discharge Instructions    Diet:  Regular diet as tolerated  Drink 8-10 glasses of water and / or juice every day    Activity:  As tolerated    Other Special Instructions: Follow up as previously;y scheduled or sooner with any worsening symptoms    Reason for being seen in L&D: pelvic pressure    Treatment/procedures performed in L&D: fetal monitoring & cervical exam    Call the [facility] at [phone] if you have:  5 or more contractions in one hour  Leaking of fluid from your vaginal area  Decreased fetal movement (follow kick count instructions)  Bleeding from your vaginal area  Swelling in your face, or increased swelling in your hands or legs  Headaches or vision problems such as blurring or seeing spots or starts  Nausea or vomiting lasting for more than 24 hours  An increase in weight (5lbs/week)  Epigastric pain (sometimes confused with heartburn that does not go away or a very bad stomach ache)    If you have any questions, please follow up with your doctor.        Patient Signature: ______________________________________________________________  By signing the above I acknowledge that a nurse or my care provider has explained the instruction to me and I have had any questions regarding my care explained to me.        Discharge Nurse Signature: _______________________________ 11/22/2024  9:16 AM    Method of discharge: amb.    Accompanied by: gretchen  Time of discharge: 0920

## 2024-11-22 NOTE — PROGRESS NOTES
S: Triage Arrival  B: Wyatt lizama @ 36W 6D gestation who presents for pelvic pressure  A: EFM / EUM applied. FHT's 125 with moderate Variability, accels present Decels not present noted on strip. Contractions every not felt per pt or seen on monitor.  R: Will notify MD to obtain further orders

## 2024-11-22 NOTE — PROGRESS NOTES
S: Discharge from triage  A: FHT's 125's, variability moderate, accels present, decels not present, contractions not seen on monitor or feltl per pt  R: Written and verbal discharge instructions provided. Pt. Verbalized understanding of discharge instructions and will follow up with provider as previously scheduled. Verbalizes understanding that she will call or return to [facility] with any further questions or concerns. Patient D/C'd to home @ 6567      Nursing Discharge Checklist  Discharge order entered: Yes Where is the patient located?  Patient care order entered: Yes Where is the patient located?  Charges entered: Yes Where is the patient located?  IV start and stop times have been documented in Epic: Not Applicable   NST start and stop times have been documented in Epic: Not Applicable

## 2024-11-27 ENCOUNTER — PRENATAL OFFICE VISIT (OUTPATIENT)
Dept: FAMILY MEDICINE | Facility: CLINIC | Age: 34
End: 2024-11-27
Payer: COMMERCIAL

## 2024-11-27 ENCOUNTER — HOSPITAL ENCOUNTER (OUTPATIENT)
Dept: ULTRASOUND IMAGING | Facility: CLINIC | Age: 34
Discharge: HOME | End: 2024-11-27
Attending: FAMILY MEDICINE
Payer: COMMERCIAL

## 2024-11-27 VITALS
DIASTOLIC BLOOD PRESSURE: 62 MMHG | BODY MASS INDEX: 42.21 KG/M2 | RESPIRATION RATE: 16 BRPM | TEMPERATURE: 97.9 F | HEART RATE: 97 BPM | SYSTOLIC BLOOD PRESSURE: 120 MMHG | WEIGHT: 256 LBS | OXYGEN SATURATION: 99 %

## 2024-11-27 DIAGNOSIS — O09.90 HIGH-RISK PREGNANCY SUPERVISION, UNSPECIFIED TRIMESTER: ICD-10-CM

## 2024-11-27 DIAGNOSIS — O09.90 HIGH-RISK PREGNANCY SUPERVISION, UNSPECIFIED TRIMESTER: Primary | ICD-10-CM

## 2024-11-27 PROCEDURE — 76819 FETAL BIOPHYS PROFIL W/O NST: CPT

## 2024-11-27 PROCEDURE — 99207 PR PRENATAL VISIT: CPT | Performed by: FAMILY MEDICINE

## 2024-11-27 RX ORDER — OXYTOCIN 10 [USP'U]/ML
10 INJECTION, SOLUTION INTRAMUSCULAR; INTRAVENOUS
OUTPATIENT
Start: 2024-11-27

## 2024-11-27 RX ORDER — METOCLOPRAMIDE HYDROCHLORIDE 5 MG/ML
10 INJECTION INTRAMUSCULAR; INTRAVENOUS EVERY 6 HOURS PRN
OUTPATIENT
Start: 2024-11-27

## 2024-11-27 RX ORDER — METHYLERGONOVINE MALEATE 0.2 MG/ML
200 INJECTION INTRAVENOUS
OUTPATIENT
Start: 2024-11-27

## 2024-11-27 RX ORDER — OXYTOCIN/0.9 % SODIUM CHLORIDE 30/500 ML
100-340 PLASTIC BAG, INJECTION (ML) INTRAVENOUS CONTINUOUS PRN
OUTPATIENT
Start: 2024-11-27

## 2024-11-27 RX ORDER — NALOXONE HYDROCHLORIDE 0.4 MG/ML
0.4 INJECTION, SOLUTION INTRAMUSCULAR; INTRAVENOUS; SUBCUTANEOUS
OUTPATIENT
Start: 2024-11-27

## 2024-11-27 RX ORDER — NALOXONE HYDROCHLORIDE 0.4 MG/ML
0.2 INJECTION, SOLUTION INTRAMUSCULAR; INTRAVENOUS; SUBCUTANEOUS
OUTPATIENT
Start: 2024-11-27

## 2024-11-27 RX ORDER — SODIUM CHLORIDE, SODIUM LACTATE, POTASSIUM CHLORIDE, CALCIUM CHLORIDE 600; 310; 30; 20 MG/100ML; MG/100ML; MG/100ML; MG/100ML
INJECTION, SOLUTION INTRAVENOUS CONTINUOUS PRN
OUTPATIENT
Start: 2024-11-27

## 2024-11-27 RX ORDER — KETOROLAC TROMETHAMINE 30 MG/ML
30 INJECTION, SOLUTION INTRAMUSCULAR; INTRAVENOUS
OUTPATIENT
Start: 2024-11-27 | End: 2024-12-02

## 2024-11-27 RX ORDER — IBUPROFEN 200 MG
800 TABLET ORAL
OUTPATIENT
Start: 2024-11-27 | End: 2024-12-02

## 2024-11-27 RX ORDER — ONDANSETRON 2 MG/ML
4 INJECTION INTRAMUSCULAR; INTRAVENOUS EVERY 6 HOURS PRN
OUTPATIENT
Start: 2024-11-27

## 2024-11-27 RX ORDER — PROCHLORPERAZINE MALEATE 5 MG/1
10 TABLET ORAL EVERY 6 HOURS PRN
OUTPATIENT
Start: 2024-11-27

## 2024-11-27 RX ORDER — METOCLOPRAMIDE 5 MG/1
10 TABLET ORAL EVERY 6 HOURS PRN
OUTPATIENT
Start: 2024-11-27

## 2024-11-27 RX ORDER — LOPERAMIDE HYDROCHLORIDE 2 MG/1
4 CAPSULE ORAL
OUTPATIENT
Start: 2024-11-27

## 2024-11-27 RX ORDER — CARBOPROST TROMETHAMINE 250 UG/ML
250 INJECTION, SOLUTION INTRAMUSCULAR
OUTPATIENT
Start: 2024-11-27

## 2024-11-27 RX ORDER — LOPERAMIDE HYDROCHLORIDE 2 MG/1
2 CAPSULE ORAL
OUTPATIENT
Start: 2024-11-27

## 2024-11-27 RX ORDER — OXYTOCIN/0.9 % SODIUM CHLORIDE 30/500 ML
340 PLASTIC BAG, INJECTION (ML) INTRAVENOUS CONTINUOUS PRN
OUTPATIENT
Start: 2024-11-27

## 2024-11-27 RX ORDER — ONDANSETRON 4 MG/1
4 TABLET, ORALLY DISINTEGRATING ORAL EVERY 6 HOURS PRN
OUTPATIENT
Start: 2024-11-27

## 2024-11-27 RX ORDER — OXYTOCIN/0.9 % SODIUM CHLORIDE 30/500 ML
1-24 PLASTIC BAG, INJECTION (ML) INTRAVENOUS CONTINUOUS
OUTPATIENT
Start: 2024-11-27

## 2024-11-27 RX ORDER — CITRIC ACID/SODIUM CITRATE 334-500MG
30 SOLUTION, ORAL ORAL
OUTPATIENT
Start: 2024-11-27

## 2024-11-27 RX ORDER — TRANEXAMIC ACID 10 MG/ML
1 INJECTION, SOLUTION INTRAVENOUS EVERY 30 MIN PRN
OUTPATIENT
Start: 2024-11-27

## 2024-11-27 RX ORDER — MISOPROSTOL 200 UG/1
400 TABLET ORAL
OUTPATIENT
Start: 2024-11-27

## 2024-11-27 RX ORDER — LIDOCAINE 40 MG/G
CREAM TOPICAL
OUTPATIENT
Start: 2024-11-27
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
## 2024-12-04 ENCOUNTER — ANESTHESIA (OUTPATIENT)
Dept: OBGYN | Facility: CLINIC | Age: 34
End: 2024-12-04
Payer: COMMERCIAL

## 2024-12-04 ENCOUNTER — PRENATAL OFFICE VISIT (OUTPATIENT)
Dept: FAMILY MEDICINE | Facility: CLINIC | Age: 34
End: 2024-12-04
Payer: COMMERCIAL

## 2024-12-04 ENCOUNTER — HOSPITAL ENCOUNTER (OUTPATIENT)
Dept: ULTRASOUND IMAGING | Facility: CLINIC | Age: 34
Discharge: HOME | End: 2024-12-04
Attending: FAMILY MEDICINE
Payer: COMMERCIAL

## 2024-12-04 ENCOUNTER — ANESTHESIA EVENT (OUTPATIENT)
Dept: OBGYN | Facility: CLINIC | Age: 34
End: 2024-12-04
Payer: COMMERCIAL

## 2024-12-04 ENCOUNTER — HOSPITAL ENCOUNTER (INPATIENT)
Facility: CLINIC | Age: 34
LOS: 1 days | Discharge: HOME | End: 2024-12-05
Attending: FAMILY MEDICINE | Admitting: FAMILY MEDICINE
Payer: COMMERCIAL

## 2024-12-04 VITALS
OXYGEN SATURATION: 97 % | HEART RATE: 86 BPM | HEIGHT: 65 IN | WEIGHT: 257 LBS | DIASTOLIC BLOOD PRESSURE: 80 MMHG | TEMPERATURE: 98.9 F | SYSTOLIC BLOOD PRESSURE: 140 MMHG | RESPIRATION RATE: 20 BRPM | BODY MASS INDEX: 42.82 KG/M2

## 2024-12-04 DIAGNOSIS — O09.90 HIGH-RISK PREGNANCY SUPERVISION, UNSPECIFIED TRIMESTER: ICD-10-CM

## 2024-12-04 DIAGNOSIS — O13.3 GESTATIONAL HYPERTENSION, THIRD TRIMESTER: ICD-10-CM

## 2024-12-04 DIAGNOSIS — N93.9 VAGINAL BLEEDING: ICD-10-CM

## 2024-12-04 DIAGNOSIS — O09.293 HIGH RISK PREGNANCY DUE TO HISTORY OF PREVIOUS OBSTETRICAL PROBLEM IN THIRD TRIMESTER: Primary | ICD-10-CM

## 2024-12-04 DIAGNOSIS — Z34.90 TERM PREGNANCY: Primary | ICD-10-CM

## 2024-12-04 DIAGNOSIS — Z98.891 S/P CESAREAN SECTION: ICD-10-CM

## 2024-12-04 PROBLEM — Z37.9 NORMAL LABOR: Status: ACTIVE | Noted: 2024-12-04

## 2024-12-04 PROBLEM — O13.9 GESTATIONAL HYPERTENSION: Status: ACTIVE | Noted: 2024-12-04

## 2024-12-04 LAB
ABO/RH(D): NORMAL
ALBUMIN MFR UR ELPH: 16.6 MG/DL
ALBUMIN SERPL BCG-MCNC: 3.4 G/DL (ref 3.5–5.2)
ALP SERPL-CCNC: 208 U/L (ref 40–150)
ALT SERPL W P-5'-P-CCNC: 20 U/L (ref 0–50)
ANION GAP SERPL CALCULATED.3IONS-SCNC: 12 MMOL/L (ref 7–15)
ANTIBODY SCREEN: NEGATIVE
AST SERPL W P-5'-P-CCNC: 27 U/L (ref 0–45)
BILIRUB SERPL-MCNC: 0.2 MG/DL
BUN SERPL-MCNC: 9.5 MG/DL (ref 6–20)
CALCIUM SERPL-MCNC: 9.6 MG/DL (ref 8.8–10.4)
CHLORIDE SERPL-SCNC: 105 MMOL/L (ref 98–107)
CREAT SERPL-MCNC: 0.63 MG/DL (ref 0.51–0.95)
CREAT UR-MCNC: 87.3 MG/DL
EGFRCR SERPLBLD CKD-EPI 2021: >90 ML/MIN/1.73M2
ERYTHROCYTE [DISTWIDTH] IN BLOOD BY AUTOMATED COUNT: 15.4 % (ref 10–15)
GLUCOSE SERPL-MCNC: 69 MG/DL (ref 70–99)
HCO3 SERPL-SCNC: 19 MMOL/L (ref 22–29)
HCT VFR BLD AUTO: 30.8 % (ref 35–47)
HGB BLD-MCNC: 10.1 G/DL (ref 11.7–15.7)
MCH RBC QN AUTO: 28 PG (ref 26.5–33)
MCHC RBC AUTO-ENTMCNC: 32.8 G/DL (ref 31.5–36.5)
MCV RBC AUTO: 85 FL (ref 78–100)
PLATELET # BLD AUTO: 249 10E3/UL (ref 150–450)
POTASSIUM SERPL-SCNC: 4.1 MMOL/L (ref 3.4–5.3)
PROT SERPL-MCNC: 6.1 G/DL (ref 6.4–8.3)
PROT/CREAT 24H UR: 0.19 MG/MG CR (ref 0–0.2)
RBC # BLD AUTO: 3.61 10E6/UL (ref 3.8–5.2)
SODIUM SERPL-SCNC: 136 MMOL/L (ref 135–145)
SPECIMEN EXPIRATION DATE: NORMAL
WBC # BLD AUTO: 9.9 10E3/UL (ref 4–11)

## 2024-12-04 PROCEDURE — 360N000076 HC SURGERY LEVEL 3, PER MIN: Performed by: FAMILY MEDICINE

## 2024-12-04 PROCEDURE — 250N000011 HC RX IP 250 OP 636: Performed by: NURSE ANESTHETIST, CERTIFIED REGISTERED

## 2024-12-04 PROCEDURE — 250N000009 HC RX 250: Performed by: NURSE ANESTHETIST, CERTIFIED REGISTERED

## 2024-12-04 PROCEDURE — 710N000010 HC RECOVERY PHASE 1, LEVEL 2, PER MIN: Performed by: FAMILY MEDICINE

## 2024-12-04 PROCEDURE — 99207 PR PRENATAL VISIT: CPT | Performed by: FAMILY MEDICINE

## 2024-12-04 PROCEDURE — 258N000003 HC RX IP 258 OP 636: Performed by: NURSE ANESTHETIST, CERTIFIED REGISTERED

## 2024-12-04 PROCEDURE — 250N000013 HC RX MED GY IP 250 OP 250 PS 637: Performed by: FAMILY MEDICINE

## 2024-12-04 PROCEDURE — 86850 RBC ANTIBODY SCREEN: CPT | Performed by: FAMILY MEDICINE

## 2024-12-04 PROCEDURE — 82040 ASSAY OF SERUM ALBUMIN: CPT | Performed by: FAMILY MEDICINE

## 2024-12-04 PROCEDURE — 250N000011 HC RX IP 250 OP 636: Performed by: FAMILY MEDICINE

## 2024-12-04 PROCEDURE — 86900 BLOOD TYPING SEROLOGIC ABO: CPT | Performed by: FAMILY MEDICINE

## 2024-12-04 PROCEDURE — 76816 OB US FOLLOW-UP PER FETUS: CPT

## 2024-12-04 PROCEDURE — 271N000001 HC OR GENERAL SUPPLY NON-STERILE: Performed by: FAMILY MEDICINE

## 2024-12-04 PROCEDURE — 59514 CESAREAN DELIVERY ONLY: CPT | Mod: 80 | Performed by: FAMILY MEDICINE

## 2024-12-04 PROCEDURE — 82247 BILIRUBIN TOTAL: CPT | Performed by: FAMILY MEDICINE

## 2024-12-04 PROCEDURE — 272N000001 HC OR GENERAL SUPPLY STERILE: Performed by: FAMILY MEDICINE

## 2024-12-04 PROCEDURE — 370N000017 HC ANESTHESIA TECHNICAL FEE, PER MIN: Performed by: FAMILY MEDICINE

## 2024-12-04 PROCEDURE — 258N000003 HC RX IP 258 OP 636: Performed by: FAMILY MEDICINE

## 2024-12-04 PROCEDURE — 80053 COMPREHEN METABOLIC PANEL: CPT | Performed by: FAMILY MEDICINE

## 2024-12-04 PROCEDURE — 250N000013 HC RX MED GY IP 250 OP 250 PS 637: Performed by: NURSE ANESTHETIST, CERTIFIED REGISTERED

## 2024-12-04 PROCEDURE — 59510 CESAREAN DELIVERY: CPT | Performed by: FAMILY MEDICINE

## 2024-12-04 PROCEDURE — 85018 HEMOGLOBIN: CPT | Performed by: FAMILY MEDICINE

## 2024-12-04 PROCEDURE — C9290 INJ, BUPIVACAINE LIPOSOME: HCPCS | Performed by: NURSE ANESTHETIST, CERTIFIED REGISTERED

## 2024-12-04 PROCEDURE — 76819 FETAL BIOPHYS PROFIL W/O NST: CPT

## 2024-12-04 PROCEDURE — 250N000009 HC RX 250: Performed by: FAMILY MEDICINE

## 2024-12-04 PROCEDURE — 120N000001 HC R&B MED SURG/OB

## 2024-12-04 PROCEDURE — 86780 TREPONEMA PALLIDUM: CPT | Performed by: FAMILY MEDICINE

## 2024-12-04 PROCEDURE — G0463 HOSPITAL OUTPT CLINIC VISIT: HCPCS

## 2024-12-04 PROCEDURE — 84156 ASSAY OF PROTEIN URINE: CPT | Performed by: FAMILY MEDICINE

## 2024-12-04 RX ORDER — ACETAMINOPHEN 325 MG/1
975 TABLET ORAL EVERY 6 HOURS PRN
Status: DISCONTINUED | OUTPATIENT
Start: 2024-12-04 | End: 2024-12-05 | Stop reason: HOSPADM

## 2024-12-04 RX ORDER — METHYLERGONOVINE MALEATE 0.2 MG/ML
200 INJECTION INTRAVENOUS
Status: DISCONTINUED | OUTPATIENT
Start: 2024-12-04 | End: 2024-12-04 | Stop reason: HOSPADM

## 2024-12-04 RX ORDER — CARBOPROST TROMETHAMINE 250 UG/ML
250 INJECTION, SOLUTION INTRAMUSCULAR
Status: DISCONTINUED | OUTPATIENT
Start: 2024-12-04 | End: 2024-12-04

## 2024-12-04 RX ORDER — ONDANSETRON 4 MG/1
4 TABLET, ORALLY DISINTEGRATING ORAL EVERY 6 HOURS PRN
Status: DISCONTINUED | OUTPATIENT
Start: 2024-12-04 | End: 2024-12-05 | Stop reason: HOSPADM

## 2024-12-04 RX ORDER — LIDOCAINE 40 MG/G
CREAM TOPICAL
Status: DISCONTINUED | OUTPATIENT
Start: 2024-12-04 | End: 2024-12-04 | Stop reason: HOSPADM

## 2024-12-04 RX ORDER — IBUPROFEN 800 MG/1
800 TABLET, FILM COATED ORAL EVERY 6 HOURS
Status: DISCONTINUED | OUTPATIENT
Start: 2024-12-05 | End: 2024-12-05

## 2024-12-04 RX ORDER — BUPIVACAINE HYDROCHLORIDE 7.5 MG/ML
INJECTION, SOLUTION INTRASPINAL PRN
Status: DISCONTINUED | OUTPATIENT
Start: 2024-12-04 | End: 2024-12-04

## 2024-12-04 RX ORDER — BUPIVACAINE HYDROCHLORIDE 2.5 MG/ML
INJECTION, SOLUTION EPIDURAL; INFILTRATION; INTRACAUDAL PRN
Status: DISCONTINUED | OUTPATIENT
Start: 2024-12-04 | End: 2024-12-04

## 2024-12-04 RX ORDER — DIPHENHYDRAMINE HCL 25 MG
25 CAPSULE ORAL EVERY 6 HOURS PRN
Status: DISCONTINUED | OUTPATIENT
Start: 2024-12-04 | End: 2024-12-05 | Stop reason: HOSPADM

## 2024-12-04 RX ORDER — ACETAMINOPHEN 325 MG/1
975 TABLET ORAL ONCE
Status: DISCONTINUED | OUTPATIENT
Start: 2024-12-04 | End: 2024-12-04 | Stop reason: HOSPADM

## 2024-12-04 RX ORDER — CARBOPROST TROMETHAMINE 250 UG/ML
250 INJECTION, SOLUTION INTRAMUSCULAR
Status: DISCONTINUED | OUTPATIENT
Start: 2024-12-04 | End: 2024-12-04 | Stop reason: HOSPADM

## 2024-12-04 RX ORDER — CITRIC ACID/SODIUM CITRATE 334-500MG
30 SOLUTION, ORAL ORAL
Status: DISCONTINUED | OUTPATIENT
Start: 2024-12-04 | End: 2024-12-04

## 2024-12-04 RX ORDER — OXYTOCIN/0.9 % SODIUM CHLORIDE 30/500 ML
100-340 PLASTIC BAG, INJECTION (ML) INTRAVENOUS CONTINUOUS PRN
Status: DISCONTINUED | OUTPATIENT
Start: 2024-12-04 | End: 2024-12-05 | Stop reason: HOSPADM

## 2024-12-04 RX ORDER — IBUPROFEN 800 MG/1
800 TABLET, FILM COATED ORAL
Status: DISCONTINUED | OUTPATIENT
Start: 2024-12-04 | End: 2024-12-05

## 2024-12-04 RX ORDER — AZITHROMYCIN MONOHYDRATE 500 MG/5ML
500 INJECTION, POWDER, LYOPHILIZED, FOR SOLUTION INTRAVENOUS
Status: COMPLETED | OUTPATIENT
Start: 2024-12-04 | End: 2024-12-04

## 2024-12-04 RX ORDER — OXYTOCIN/0.9 % SODIUM CHLORIDE 30/500 ML
1-24 PLASTIC BAG, INJECTION (ML) INTRAVENOUS CONTINUOUS
Status: DISCONTINUED | OUTPATIENT
Start: 2024-12-04 | End: 2024-12-04 | Stop reason: HOSPADM

## 2024-12-04 RX ORDER — METOCLOPRAMIDE HYDROCHLORIDE 5 MG/ML
10 INJECTION INTRAMUSCULAR; INTRAVENOUS EVERY 6 HOURS PRN
Status: DISCONTINUED | OUTPATIENT
Start: 2024-12-04 | End: 2024-12-04 | Stop reason: HOSPADM

## 2024-12-04 RX ORDER — FENTANYL CITRATE 50 UG/ML
50 INJECTION, SOLUTION INTRAMUSCULAR; INTRAVENOUS EVERY 5 MIN PRN
Status: DISCONTINUED | OUTPATIENT
Start: 2024-12-04 | End: 2024-12-05

## 2024-12-04 RX ORDER — FENTANYL CITRATE-0.9 % NACL/PF 10 MCG/ML
PLASTIC BAG, INJECTION (ML) INTRAVENOUS CONTINUOUS PRN
Status: DISCONTINUED | OUTPATIENT
Start: 2024-12-04 | End: 2024-12-04

## 2024-12-04 RX ORDER — METHYLERGONOVINE MALEATE 0.2 MG/ML
200 INJECTION INTRAVENOUS
Status: DISCONTINUED | OUTPATIENT
Start: 2024-12-04 | End: 2024-12-04

## 2024-12-04 RX ORDER — LOPERAMIDE HYDROCHLORIDE 2 MG/1
4 CAPSULE ORAL
Status: DISCONTINUED | OUTPATIENT
Start: 2024-12-04 | End: 2024-12-05 | Stop reason: HOSPADM

## 2024-12-04 RX ORDER — DEXTROSE, SODIUM CHLORIDE, SODIUM LACTATE, POTASSIUM CHLORIDE, AND CALCIUM CHLORIDE 5; .6; .31; .03; .02 G/100ML; G/100ML; G/100ML; G/100ML; G/100ML
INJECTION, SOLUTION INTRAVENOUS CONTINUOUS
Status: DISCONTINUED | OUTPATIENT
Start: 2024-12-04 | End: 2024-12-05 | Stop reason: HOSPADM

## 2024-12-04 RX ORDER — ONDANSETRON 2 MG/ML
4 INJECTION INTRAMUSCULAR; INTRAVENOUS EVERY 6 HOURS PRN
Status: DISCONTINUED | OUTPATIENT
Start: 2024-12-04 | End: 2024-12-05 | Stop reason: HOSPADM

## 2024-12-04 RX ORDER — OXYCODONE HYDROCHLORIDE 5 MG/1
5 TABLET ORAL EVERY 4 HOURS PRN
Status: DISCONTINUED | OUTPATIENT
Start: 2024-12-04 | End: 2024-12-05 | Stop reason: HOSPADM

## 2024-12-04 RX ORDER — HYDROMORPHONE HCL IN WATER/PF 6 MG/30 ML
0.2 PATIENT CONTROLLED ANALGESIA SYRINGE INTRAVENOUS EVERY 5 MIN PRN
Status: DISCONTINUED | OUTPATIENT
Start: 2024-12-04 | End: 2024-12-05

## 2024-12-04 RX ORDER — ONDANSETRON 2 MG/ML
INJECTION INTRAMUSCULAR; INTRAVENOUS PRN
Status: DISCONTINUED | OUTPATIENT
Start: 2024-12-04 | End: 2024-12-04

## 2024-12-04 RX ORDER — OXYTOCIN 10 [USP'U]/ML
10 INJECTION, SOLUTION INTRAMUSCULAR; INTRAVENOUS
Status: DISCONTINUED | OUTPATIENT
Start: 2024-12-04 | End: 2024-12-04

## 2024-12-04 RX ORDER — NALOXONE HYDROCHLORIDE 0.4 MG/ML
0.1 INJECTION, SOLUTION INTRAMUSCULAR; INTRAVENOUS; SUBCUTANEOUS
Status: DISCONTINUED | OUTPATIENT
Start: 2024-12-04 | End: 2024-12-05 | Stop reason: HOSPADM

## 2024-12-04 RX ORDER — PROCHLORPERAZINE MALEATE 5 MG/1
10 TABLET ORAL EVERY 6 HOURS PRN
Status: DISCONTINUED | OUTPATIENT
Start: 2024-12-04 | End: 2024-12-05 | Stop reason: HOSPADM

## 2024-12-04 RX ORDER — TRANEXAMIC ACID 10 MG/ML
1 INJECTION, SOLUTION INTRAVENOUS EVERY 30 MIN PRN
Status: DISCONTINUED | OUTPATIENT
Start: 2024-12-04 | End: 2024-12-05 | Stop reason: HOSPADM

## 2024-12-04 RX ORDER — MORPHINE SULFATE 1 MG/ML
INJECTION, SOLUTION EPIDURAL; INTRATHECAL; INTRAVENOUS PRN
Status: DISCONTINUED | OUTPATIENT
Start: 2024-12-04 | End: 2024-12-04

## 2024-12-04 RX ORDER — LOPERAMIDE HYDROCHLORIDE 2 MG/1
2 CAPSULE ORAL
Status: DISCONTINUED | OUTPATIENT
Start: 2024-12-04 | End: 2024-12-04 | Stop reason: HOSPADM

## 2024-12-04 RX ORDER — LIDOCAINE 40 MG/G
CREAM TOPICAL
Status: DISCONTINUED | OUTPATIENT
Start: 2024-12-04 | End: 2024-12-05 | Stop reason: HOSPADM

## 2024-12-04 RX ORDER — FENTANYL CITRATE 50 UG/ML
25 INJECTION, SOLUTION INTRAMUSCULAR; INTRAVENOUS EVERY 5 MIN PRN
Status: DISCONTINUED | OUTPATIENT
Start: 2024-12-04 | End: 2024-12-05

## 2024-12-04 RX ORDER — OXYTOCIN/0.9 % SODIUM CHLORIDE 30/500 ML
340 PLASTIC BAG, INJECTION (ML) INTRAVENOUS CONTINUOUS PRN
Status: DISCONTINUED | OUTPATIENT
Start: 2024-12-04 | End: 2024-12-04 | Stop reason: HOSPADM

## 2024-12-04 RX ORDER — FENTANYL CITRATE 50 UG/ML
INJECTION, SOLUTION INTRAMUSCULAR; INTRAVENOUS PRN
Status: DISCONTINUED | OUTPATIENT
Start: 2024-12-04 | End: 2024-12-04

## 2024-12-04 RX ORDER — CEFAZOLIN SODIUM/WATER 2 G/20 ML
2 SYRINGE (ML) INTRAVENOUS
Status: COMPLETED | OUTPATIENT
Start: 2024-12-04 | End: 2024-12-04

## 2024-12-04 RX ORDER — SODIUM CHLORIDE, SODIUM LACTATE, POTASSIUM CHLORIDE, CALCIUM CHLORIDE 600; 310; 30; 20 MG/100ML; MG/100ML; MG/100ML; MG/100ML
INJECTION, SOLUTION INTRAVENOUS CONTINUOUS
Status: DISCONTINUED | OUTPATIENT
Start: 2024-12-04 | End: 2024-12-05 | Stop reason: HOSPADM

## 2024-12-04 RX ORDER — BISACODYL 10 MG
10 SUPPOSITORY, RECTAL RECTAL DAILY PRN
Status: DISCONTINUED | OUTPATIENT
Start: 2024-12-06 | End: 2024-12-05 | Stop reason: HOSPADM

## 2024-12-04 RX ORDER — PROCHLORPERAZINE MALEATE 5 MG/1
10 TABLET ORAL EVERY 6 HOURS PRN
Status: DISCONTINUED | OUTPATIENT
Start: 2024-12-04 | End: 2024-12-04 | Stop reason: HOSPADM

## 2024-12-04 RX ORDER — TRANEXAMIC ACID 10 MG/ML
1 INJECTION, SOLUTION INTRAVENOUS EVERY 30 MIN PRN
Status: DISCONTINUED | OUTPATIENT
Start: 2024-12-04 | End: 2024-12-04

## 2024-12-04 RX ORDER — METHYLERGONOVINE MALEATE 0.2 MG/ML
200 INJECTION INTRAVENOUS
Status: DISCONTINUED | OUTPATIENT
Start: 2024-12-04 | End: 2024-12-05 | Stop reason: HOSPADM

## 2024-12-04 RX ORDER — MISOPROSTOL 200 UG/1
400 TABLET ORAL
Status: DISCONTINUED | OUTPATIENT
Start: 2024-12-04 | End: 2024-12-04

## 2024-12-04 RX ORDER — AMOXICILLIN 250 MG
2 CAPSULE ORAL 2 TIMES DAILY
Status: DISCONTINUED | OUTPATIENT
Start: 2024-12-04 | End: 2024-12-05 | Stop reason: HOSPADM

## 2024-12-04 RX ORDER — DEXAMETHASONE SODIUM PHOSPHATE 10 MG/ML
4 INJECTION, SOLUTION INTRAMUSCULAR; INTRAVENOUS
Status: DISCONTINUED | OUTPATIENT
Start: 2024-12-04 | End: 2024-12-05

## 2024-12-04 RX ORDER — KETOROLAC TROMETHAMINE 30 MG/ML
30 INJECTION, SOLUTION INTRAMUSCULAR; INTRAVENOUS
Status: DISCONTINUED | OUTPATIENT
Start: 2024-12-04 | End: 2024-12-05

## 2024-12-04 RX ORDER — MISOPROSTOL 200 UG/1
400 TABLET ORAL
Status: DISCONTINUED | OUTPATIENT
Start: 2024-12-04 | End: 2024-12-04 | Stop reason: HOSPADM

## 2024-12-04 RX ORDER — CEFAZOLIN SODIUM/WATER 2 G/20 ML
2 SYRINGE (ML) INTRAVENOUS SEE ADMIN INSTRUCTIONS
Status: DISCONTINUED | OUTPATIENT
Start: 2024-12-04 | End: 2024-12-04 | Stop reason: HOSPADM

## 2024-12-04 RX ORDER — KETOROLAC TROMETHAMINE 30 MG/ML
30 INJECTION, SOLUTION INTRAMUSCULAR; INTRAVENOUS EVERY 6 HOURS
Status: DISCONTINUED | OUTPATIENT
Start: 2024-12-04 | End: 2024-12-05

## 2024-12-04 RX ORDER — NALOXONE HYDROCHLORIDE 0.4 MG/ML
0.4 INJECTION, SOLUTION INTRAMUSCULAR; INTRAVENOUS; SUBCUTANEOUS
Status: DISCONTINUED | OUTPATIENT
Start: 2024-12-04 | End: 2024-12-04 | Stop reason: HOSPADM

## 2024-12-04 RX ORDER — ONDANSETRON 4 MG/1
4 TABLET, ORALLY DISINTEGRATING ORAL EVERY 6 HOURS PRN
Status: DISCONTINUED | OUTPATIENT
Start: 2024-12-04 | End: 2024-12-04 | Stop reason: HOSPADM

## 2024-12-04 RX ORDER — CARBOPROST TROMETHAMINE 250 UG/ML
250 INJECTION, SOLUTION INTRAMUSCULAR
Status: DISCONTINUED | OUTPATIENT
Start: 2024-12-04 | End: 2024-12-05 | Stop reason: HOSPADM

## 2024-12-04 RX ORDER — SODIUM CHLORIDE, SODIUM LACTATE, POTASSIUM CHLORIDE, CALCIUM CHLORIDE 600; 310; 30; 20 MG/100ML; MG/100ML; MG/100ML; MG/100ML
INJECTION, SOLUTION INTRAVENOUS CONTINUOUS PRN
Status: DISCONTINUED | OUTPATIENT
Start: 2024-12-04 | End: 2024-12-04 | Stop reason: HOSPADM

## 2024-12-04 RX ORDER — ONDANSETRON 4 MG/1
4 TABLET, ORALLY DISINTEGRATING ORAL EVERY 30 MIN PRN
Status: DISCONTINUED | OUTPATIENT
Start: 2024-12-04 | End: 2024-12-05

## 2024-12-04 RX ORDER — OXYTOCIN 10 [USP'U]/ML
10 INJECTION, SOLUTION INTRAMUSCULAR; INTRAVENOUS
Status: DISCONTINUED | OUTPATIENT
Start: 2024-12-04 | End: 2024-12-05 | Stop reason: HOSPADM

## 2024-12-04 RX ORDER — SODIUM CHLORIDE, SODIUM LACTATE, POTASSIUM CHLORIDE, CALCIUM CHLORIDE 600; 310; 30; 20 MG/100ML; MG/100ML; MG/100ML; MG/100ML
INJECTION, SOLUTION INTRAVENOUS CONTINUOUS
Status: DISCONTINUED | OUTPATIENT
Start: 2024-12-04 | End: 2024-12-04 | Stop reason: HOSPADM

## 2024-12-04 RX ORDER — LOPERAMIDE HYDROCHLORIDE 2 MG/1
2 CAPSULE ORAL
Status: DISCONTINUED | OUTPATIENT
Start: 2024-12-04 | End: 2024-12-04

## 2024-12-04 RX ORDER — NALOXONE HYDROCHLORIDE 0.4 MG/ML
0.2 INJECTION, SOLUTION INTRAMUSCULAR; INTRAVENOUS; SUBCUTANEOUS
Status: DISCONTINUED | OUTPATIENT
Start: 2024-12-04 | End: 2024-12-04 | Stop reason: HOSPADM

## 2024-12-04 RX ORDER — ACETAMINOPHEN 325 MG/1
975 TABLET ORAL EVERY 6 HOURS
Status: DISCONTINUED | OUTPATIENT
Start: 2024-12-04 | End: 2024-12-05 | Stop reason: HOSPADM

## 2024-12-04 RX ORDER — ALBUTEROL SULFATE 0.83 MG/ML
2.5 SOLUTION RESPIRATORY (INHALATION) EVERY 4 HOURS PRN
Status: DISCONTINUED | OUTPATIENT
Start: 2024-12-04 | End: 2024-12-05 | Stop reason: HOSPADM

## 2024-12-04 RX ORDER — METOCLOPRAMIDE 5 MG/1
10 TABLET ORAL EVERY 6 HOURS PRN
Status: DISCONTINUED | OUTPATIENT
Start: 2024-12-04 | End: 2024-12-05 | Stop reason: HOSPADM

## 2024-12-04 RX ORDER — OXYTOCIN/0.9 % SODIUM CHLORIDE 30/500 ML
340 PLASTIC BAG, INJECTION (ML) INTRAVENOUS CONTINUOUS PRN
Status: DISCONTINUED | OUTPATIENT
Start: 2024-12-04 | End: 2024-12-05 | Stop reason: HOSPADM

## 2024-12-04 RX ORDER — SODIUM PHOSPHATE,MONO-DIBASIC 19G-7G/118
1 ENEMA (ML) RECTAL DAILY PRN
Status: DISCONTINUED | OUTPATIENT
Start: 2024-12-06 | End: 2024-12-05 | Stop reason: HOSPADM

## 2024-12-04 RX ORDER — HYDROCORTISONE 25 MG/G
CREAM TOPICAL 3 TIMES DAILY PRN
Status: DISCONTINUED | OUTPATIENT
Start: 2024-12-04 | End: 2024-12-05 | Stop reason: HOSPADM

## 2024-12-04 RX ORDER — FERROUS SULFATE 325(65) MG
325 TABLET ORAL DAILY
Status: DISCONTINUED | OUTPATIENT
Start: 2024-12-04 | End: 2024-12-05 | Stop reason: HOSPADM

## 2024-12-04 RX ORDER — MISOPROSTOL 200 UG/1
400 TABLET ORAL
Status: DISCONTINUED | OUTPATIENT
Start: 2024-12-04 | End: 2024-12-05 | Stop reason: HOSPADM

## 2024-12-04 RX ORDER — ONDANSETRON 2 MG/ML
4 INJECTION INTRAMUSCULAR; INTRAVENOUS EVERY 6 HOURS PRN
Status: DISCONTINUED | OUTPATIENT
Start: 2024-12-04 | End: 2024-12-04 | Stop reason: HOSPADM

## 2024-12-04 RX ORDER — METOCLOPRAMIDE HYDROCHLORIDE 5 MG/ML
10 INJECTION INTRAMUSCULAR; INTRAVENOUS EVERY 6 HOURS PRN
Status: DISCONTINUED | OUTPATIENT
Start: 2024-12-04 | End: 2024-12-05 | Stop reason: HOSPADM

## 2024-12-04 RX ORDER — DIPHENHYDRAMINE HYDROCHLORIDE 50 MG/ML
25 INJECTION INTRAMUSCULAR; INTRAVENOUS EVERY 6 HOURS PRN
Status: DISCONTINUED | OUTPATIENT
Start: 2024-12-04 | End: 2024-12-05 | Stop reason: HOSPADM

## 2024-12-04 RX ORDER — SIMETHICONE 80 MG
80 TABLET,CHEWABLE ORAL 4 TIMES DAILY PRN
Status: DISCONTINUED | OUTPATIENT
Start: 2024-12-04 | End: 2024-12-05 | Stop reason: HOSPADM

## 2024-12-04 RX ORDER — HYDROMORPHONE HYDROCHLORIDE 1 MG/ML
0.5 INJECTION, SOLUTION INTRAMUSCULAR; INTRAVENOUS; SUBCUTANEOUS EVERY 5 MIN PRN
Status: DISCONTINUED | OUTPATIENT
Start: 2024-12-04 | End: 2024-12-05

## 2024-12-04 RX ORDER — TRANEXAMIC ACID 10 MG/ML
1 INJECTION, SOLUTION INTRAVENOUS EVERY 30 MIN PRN
Status: DISCONTINUED | OUTPATIENT
Start: 2024-12-04 | End: 2024-12-04 | Stop reason: HOSPADM

## 2024-12-04 RX ORDER — HYDROXYZINE HYDROCHLORIDE 25 MG/1
25 TABLET, FILM COATED ORAL EVERY 6 HOURS PRN
Status: DISCONTINUED | OUTPATIENT
Start: 2024-12-04 | End: 2024-12-05 | Stop reason: HOSPADM

## 2024-12-04 RX ORDER — AMOXICILLIN 250 MG
1 CAPSULE ORAL 2 TIMES DAILY
Status: DISCONTINUED | OUTPATIENT
Start: 2024-12-04 | End: 2024-12-05 | Stop reason: HOSPADM

## 2024-12-04 RX ORDER — LOPERAMIDE HYDROCHLORIDE 2 MG/1
4 CAPSULE ORAL
Status: DISCONTINUED | OUTPATIENT
Start: 2024-12-04 | End: 2024-12-04 | Stop reason: HOSPADM

## 2024-12-04 RX ORDER — OXYTOCIN 10 [USP'U]/ML
10 INJECTION, SOLUTION INTRAMUSCULAR; INTRAVENOUS
Status: DISCONTINUED | OUTPATIENT
Start: 2024-12-04 | End: 2024-12-04 | Stop reason: HOSPADM

## 2024-12-04 RX ORDER — MODIFIED LANOLIN
OINTMENT (GRAM) TOPICAL
Status: DISCONTINUED | OUTPATIENT
Start: 2024-12-04 | End: 2024-12-05 | Stop reason: HOSPADM

## 2024-12-04 RX ORDER — LOPERAMIDE HYDROCHLORIDE 2 MG/1
2 CAPSULE ORAL
Status: DISCONTINUED | OUTPATIENT
Start: 2024-12-04 | End: 2024-12-05 | Stop reason: HOSPADM

## 2024-12-04 RX ORDER — METOCLOPRAMIDE 5 MG/1
10 TABLET ORAL EVERY 6 HOURS PRN
Status: DISCONTINUED | OUTPATIENT
Start: 2024-12-04 | End: 2024-12-04 | Stop reason: HOSPADM

## 2024-12-04 RX ORDER — ONDANSETRON 2 MG/ML
4 INJECTION INTRAMUSCULAR; INTRAVENOUS EVERY 30 MIN PRN
Status: DISCONTINUED | OUTPATIENT
Start: 2024-12-04 | End: 2024-12-05

## 2024-12-04 RX ORDER — LOPERAMIDE HYDROCHLORIDE 2 MG/1
4 CAPSULE ORAL
Status: DISCONTINUED | OUTPATIENT
Start: 2024-12-04 | End: 2024-12-04

## 2024-12-04 RX ORDER — LIDOCAINE 40 MG/G
CREAM TOPICAL
Status: DISCONTINUED | OUTPATIENT
Start: 2024-12-04 | End: 2024-12-04

## 2024-12-04 RX ORDER — OXYTOCIN/0.9 % SODIUM CHLORIDE 30/500 ML
PLASTIC BAG, INJECTION (ML) INTRAVENOUS CONTINUOUS PRN
Status: DISCONTINUED | OUTPATIENT
Start: 2024-12-04 | End: 2024-12-04

## 2024-12-04 RX ORDER — CITRIC ACID/SODIUM CITRATE 334-500MG
30 SOLUTION, ORAL ORAL
Status: COMPLETED | OUTPATIENT
Start: 2024-12-04 | End: 2024-12-04

## 2024-12-04 RX ADMIN — SODIUM CITRATE AND CITRIC ACID MONOHYDRATE 30 ML: 500; 334 SOLUTION ORAL at 13:37

## 2024-12-04 RX ADMIN — BUPIVACAINE HYDROCHLORIDE 15 ML: 2.5 INJECTION, SOLUTION EPIDURAL; INFILTRATION; INTRACAUDAL; PERINEURAL at 14:53

## 2024-12-04 RX ADMIN — BUPIVACAINE HYDROCHLORIDE IN DEXTROSE 1.6 ML: 7.5 INJECTION, SOLUTION SUBARACHNOID at 13:44

## 2024-12-04 RX ADMIN — Medication 2 G: at 13:35

## 2024-12-04 RX ADMIN — BUPIVACAINE HYDROCHLORIDE 15 ML: 2.5 INJECTION, SOLUTION EPIDURAL; INFILTRATION; INTRACAUDAL; PERINEURAL at 14:49

## 2024-12-04 RX ADMIN — FENTANYL CITRATE 10 MCG: 50 INJECTION INTRAMUSCULAR; INTRAVENOUS at 13:44

## 2024-12-04 RX ADMIN — ACETAMINOPHEN 975 MG: 325 TABLET, FILM COATED ORAL at 23:38

## 2024-12-04 RX ADMIN — BUPIVACAINE 10 ML: 13.3 INJECTION, SUSPENSION, LIPOSOMAL INFILTRATION at 14:53

## 2024-12-04 RX ADMIN — KETOROLAC TROMETHAMINE 30 MG: 30 INJECTION, SOLUTION INTRAMUSCULAR at 21:42

## 2024-12-04 RX ADMIN — ACETAMINOPHEN 975 MG: 325 TABLET, FILM COATED ORAL at 16:43

## 2024-12-04 RX ADMIN — AZITHROMYCIN MONOHYDRATE 500 MG: 500 INJECTION, POWDER, LYOPHILIZED, FOR SOLUTION INTRAVENOUS at 13:35

## 2024-12-04 RX ADMIN — Medication 340 ML/HR: at 14:01

## 2024-12-04 RX ADMIN — MORPHINE SULFATE 0.1 MG: 1 INJECTION EPIDURAL; INTRATHECAL; INTRAVENOUS at 13:44

## 2024-12-04 RX ADMIN — PHENYLEPHRINE HYDROCHLORIDE 100 MCG: 10 INJECTION INTRAVENOUS at 13:46

## 2024-12-04 RX ADMIN — DIPHENHYDRAMINE HYDROCHLORIDE 25 MG: 50 INJECTION, SOLUTION INTRAMUSCULAR; INTRAVENOUS at 15:28

## 2024-12-04 RX ADMIN — Medication 50 MCG/MIN: at 13:45

## 2024-12-04 RX ADMIN — SENNOSIDES AND DOCUSATE SODIUM 2 TABLET: 8.6; 5 TABLET ORAL at 21:42

## 2024-12-04 RX ADMIN — SODIUM CHLORIDE, POTASSIUM CHLORIDE, SODIUM LACTATE AND CALCIUM CHLORIDE: 600; 310; 30; 20 INJECTION, SOLUTION INTRAVENOUS at 12:27

## 2024-12-04 RX ADMIN — DIPHENHYDRAMINE HYDROCHLORIDE 25 MG: 25 CAPSULE ORAL at 22:08

## 2024-12-04 RX ADMIN — BUPIVACAINE 10 ML: 13.3 INJECTION, SUSPENSION, LIPOSOMAL INFILTRATION at 14:49

## 2024-12-04 RX ADMIN — FERROUS SULFATE TAB 325 MG (65 MG ELEMENTAL FE) 325 MG: 325 (65 FE) TAB at 21:42

## 2024-12-04 RX ADMIN — HYDROXYZINE HYDROCHLORIDE 25 MG: 25 TABLET ORAL at 23:39

## 2024-12-04 RX ADMIN — Medication 2 MILLI-UNITS/MIN: at 12:42

## 2024-12-04 RX ADMIN — ONDANSETRON 4 MG: 2 INJECTION INTRAMUSCULAR; INTRAVENOUS at 13:46

## 2024-12-04 ASSESSMENT — ACTIVITIES OF DAILY LIVING (ADL)
ADLS_ACUITY_SCORE: 22
ADLS_ACUITY_SCORE: 20
ADLS_ACUITY_SCORE: 42
ADLS_ACUITY_SCORE: 19
ADLS_ACUITY_SCORE: 20
ADLS_ACUITY_SCORE: 20
ADLS_ACUITY_SCORE: 19
ADLS_ACUITY_SCORE: 20
ADLS_ACUITY_SCORE: 19
ADLS_ACUITY_SCORE: 20
ADLS_ACUITY_SCORE: 19
ADLS_ACUITY_SCORE: 20
ADLS_ACUITY_SCORE: 19
ADLS_ACUITY_SCORE: 19

## 2024-12-04 ASSESSMENT — PAIN SCALES - GENERAL: PAINLEVEL_OUTOF10: NO PAIN (0)

## 2024-12-04 NOTE — PROGRESS NOTES
Pt up to the bathroom and passed lemon sized clot. Continues to have some bleeding. Pt deines pain. Dr. Vallejo called to evaulate

## 2024-12-04 NOTE — PROGRESS NOTES
Dr Vallejo checked patient. Noted more blood on glove, but believes water is still intact. Patient continues to bleed.

## 2024-12-04 NOTE — ANESTHESIA PROCEDURE NOTES
TAP Procedure Note    Pre-Procedure   Staff -        Performed By: CRNA       Location: OR       Procedure Start/Stop Times: 12/4/2024 2:42 PM and 12/4/2024 2:54 PM       Pre-Anesthestic Checklist: patient identified, IV checked, site marked, risks and benefits discussed, informed consent, monitors and equipment checked, pre-op evaluation, at physician/surgeon's request and post-op pain management  Timeout:       Correct Patient: Yes        Correct Procedure: Yes        Correct Site: Yes        Correct Position: Yes        Correct Laterality: Yes        Site Marked: Yes  Procedure Documentation  Procedure: TAP       Laterality: bilateral       Patient Position: supine       Patient Prep/Sterile Barriers: sterile gloves, mask       Skin prep: Chloraprep       Needle Type: insulated       Needle Gauge: 22.        Needle Length (millimeters): 100        Ultrasound guided       1. Ultrasound was used to identify targeted nerve, plexus, vascular marker, or fascial plane and place a needle adjacent to it in real-time.       2. Ultrasound was used to visualize the spread of anesthetic in close proximity to the above referenced structure.       3. A permanent image is entered into the patient's record.    Assessment/Narrative         The placement was negative for: blood aspirated, painful injection and site bleeding       Paresthesias: No.       Test dose of mL at.         Test dose negative, 3 minutes after injection, for signs of intravascular, subdural, or intrathecal injection.       Bolus given via needle..        Secured via.        Insertion/Infusion Method: Single Shot       Complications: none       Injection made incrementally with aspirations every 5 mL.    Medication(s) Administered   Medication Administration Time: 12/4/2024 2:42 PM     Comments:  TAP performed by MARVIN Us under the direct supervision of OMKAR Miles CRNA. Pt tolerated the procedure well as under spinal anesthesia.  There was good visualization  "of the space between the internal oblique and the transverses abdominis.  There was also good visualization of fluid dissection in this layer.  No complications were noted.  I will follow up with this pt if needed.      FOR [facility] ([facility]) ONLY:   Pain Team Contact information: please page the Pain Team Via [employer]. Search \"Pain\". During daytime hours, please page the attending first. At night please page the resident first.      "

## 2024-12-04 NOTE — ANESTHESIA PROCEDURE NOTES
"Intrathecal Procedure Note    Pre-Procedure   Staff -        CRNA: Cara Miles APRN CRNA       Other Anesthesia Staff: Paige Us       Performed By: CRNA       Location: OR       Procedure Start/Stop Times: 12/4/2024 1:40 PM and 12/4/2024 1:44 PM       Pre-Anesthestic Checklist: patient identified, IV checked, risks and benefits discussed, informed consent, monitors and equipment checked and pre-op evaluation  Timeout:       Correct Patient: Yes        Correct Procedure: Yes        Correct Site: Yes        Correct Position: Yes   Procedure Documentation  Procedure: intrathecal       Diagnosis: c section       Patient Position: sitting       Patient Prep/Sterile Barriers: sterile gloves, mask, patient draped       Skin prep: Betadine       Insertion Site: L3-4. (midline approach).       Needle Gauge: 25.        Needle Length (Inches): 3.5        Spinal Needle Type: Pencan       Introducer used       Introducer: 20 G       # of attempts: 1 and  # of redirects:  0    Assessment/Narrative         Paresthesias: No.       CSF fluid: clear.       Opening pressure was cmH2O while  Sitting.      Medication(s) Administered   Medication Administration Time: 12/4/2024 1:40 PM     Comments:  Spinal inserted by MARVIN Us under the direct supervision of OMKAR Miles. Small amount of blood in needle initially, but this cleared quickly with clear spinal fluid. Administered spinal bup without any apparent complications.       FOR [facility] ([facility]) ONLY:   Pain Team Contact information: please page the Pain Team Via [employer]. Search \"Pain\". During daytime hours, please page the attending first. At night please page the resident first.      "

## 2024-12-04 NOTE — ANESTHESIA CARE TRANSFER NOTE
Patient: Wyatt Bruno    Procedure: Procedure(s):   SECTION       Diagnosis: Term pregnancy [Z34.90]  High-risk pregnancy supervision, unspecified trimester [O09.90]  Gestational hypertension, third trimester [O13.3]  Vaginal bleeding [N93.9]  Diagnosis Additional Information: No value filed.    Anesthesia Type:   Spinal     Note:    Oropharynx: oropharynx clear of all foreign objects and spontaneously breathing  Level of Consciousness: awake  Oxygen Supplementation: room air    Independent Airway: airway patency satisfactory and stable  Dentition: dentition unchanged  Vital Signs Stable: post-procedure vital signs reviewed and stable  Report to RN Given: handoff report given  Patient transferred to: PACU    Handoff Report: Identifed the Patient, Identified the Reponsible Provider, Reviewed the pertinent medical history, Discussed the surgical course, Reviewed Intra-OP anesthesia mangement and issues during anesthesia, Set expectations for post-procedure period and Allowed opportunity for questions and acknowledgement of understanding    Vitals:  Vitals Value Taken Time   BP     Temp     Pulse     Resp     SpO2         Electronically Signed By: JEFF Rogers CRNA  2024  3:12 PM
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
## 2024-12-04 NOTE — PROGRESS NOTES
After SVE SROM occurred. Fluid bloody colored. Will continue to monitor. Dr. Vallejo aware. Pt did have placenta previa at the beginning of pregnancy so Dr. Vallejo going to radiology to see ultrasound from today. Ultrasound confirmed that baby is head down

## 2024-12-04 NOTE — L&D DELIVERY NOTE
Delivery Summary    Wyatt Bruno MRN# [id number]   Age: 34 year old YOB: 1990     OP NOTE      PREOPERATIVE DIAGNOSES:    Intrauterine pregnancy at 38w4d weeks' gestation  Vaginal bleeding - abruption suspected  PIH  STAT     POSTOPERATIVE DIAGNOSIS:   Same  Abruption- suspected, unable to confirm    PROCEDURE:   Primary low transverse  section    FINDINGS: live , weighing 4505 gms with apgars 9, and 9 at 1 and 5 min.   Meconium was Not Present.  No obvious blood/clot directly from the placenta-uterine plane  In room at 1335, incision at 1353, delivery at 1357, finally ending at 1433.  Cord gas: No  Meds: Ancef,       EBL: 1145 ml  FLUIDS: 700 ml  UOP: 75 ml clear or slightly blood tinged    SURGEON: SILVIANO Gallardo  ASSISTANT: DARIANO mukherjee assist:      ANESTHESIA: Spinal    INDICATIONS:  patient at 38w4d with vaginal bleeding, remote from delivery. Suspected abruption. Called STAT CS. Please see   EPIC note for further details.     PROCEDURE: Wyatt  was taken to the operating room where Spinal anesthesia was used with adequate effect.  She was then laid in the supine position and a henson catheter was placed. She was then prepped and draped in the routine sterile fashion. Under adequate anesthesia, a Pfannenstiel incision was made with a #15 blade. The incision was extended down through the subcutaneous tissue with blunt and sharp dissection, cauterizing any obvious bleeding. The midline of the fascia was scored and carried transversely in both directions with a curved Chacon scissors. The fascia was then dissected off the anterior surface of the rectus muscle inferiorly and superiorly with both blunt and sharp dissection. The rectus muscles were then  in the midline and entered bluntly, carefully avoiding any bowel. This was then extended with gentle traction laterally until adequate space was appreciated. The Brennen O retractor was them placed. A bladder flap  was created bluntly by grasping the uteroviscular peritoneum with Swetha and moving laterally with a Metzenbaum scissors and/or finger. With good exposure, I incised the uterus with a #15 blade, carefully getting down to the final layer. This was then broken through with a Swetha and the uterine incision was extended with cephalad and caudad pressure. The head was elevated; which was delivered through the uterine and then abdominal incision atraumatically, being careful to elevate and not use the uterine incision as fulcrum. The remainder of the body was then delivered and spontaneous crying and respirations were noted with this viable . The  was then suctioned through the mouth and nares, and stimulated. Then the cord was clamped twice and cut and the baby brought over to the warming table and nursing staff by the assistant. Cord bloods were then obtained.     The placenta was then removed spontaneously and intact with the aid of fundal massage. The uterine cavity cleaned with laps. I then controlled any obvious bleeding with Ferrari clamps on the myotomy and isolated the corners. We closed the uterine incision using 0 Vicryl in a running locked fashion, followed by a second imbricating layer of 0 Monocryl. The bleeding was controlled with closure of the hysterotomy. Hemostasis was excellent. The bladder flap was not repaired. Irrigation was then done to remove any blood clots and debris. On reinspection, the uterine incision was dry and clear of any bleeding. The parietal peritoneum was then closed using 0 Vicryl in a running fashion. The corners of the fascia on either end were isolated with Kocher clamps. The rectus fascia was re-approximated with 0-Vicryl in running fashion. The skin was closed with 3-0 Quill and skin glue. Mom and baby now in the recovery room together and stable.    I asked Dr. Vallejo to assist in this STAT c section due to suspected abruption. His assistance was required due to the  "high risk nature of bleeding and abruption and potentential rapid decompensation of moms and babies during abruption.     Sylvester Gallardo MD  3:02 PM   2024        Justin Peter [[id number]]      Labor Events     labor?: No   steroids: None  Predominate monitoring during 1st stage: continuous electronic fetal monitoring       Induction date/time:      Cervical ripening date/time:      Indications for induction: Gestational Hypertension     Augmentation: None       Delivery/Placenta Date and Time      Delivery Date: 24 Delivery Time:  1:57 PM   Delivering clinician: No Vallejo MD   Other personnel present at delivery:  Provider Role   Sylvester Gallardo MD Pelletier, Michelle E, RN              Apgars    Living status: Living   1 Minute 5 Minute 10 Minute 15 Minute 20 Minute   Skin color: 1  1       Heart rate: 2  2       Reflex irritability: 2  2       Muscle tone: 2  2       Respiratory effort: 2  2       Total: 9  9       Apgars assigned by: GABI ALBA RN       Cord      Vessels: 3 Vessels    Cord Complications: None               Cord Blood Disposition: Discard    Gases Sent?: No    Delayed cord clamping?: Yes    Cord Clamping Delay (seconds): 31-60 seconds           [place] Resuscitation    Methods: None        Measurements      Weight: 9 lb 14.9 oz Length: 1' 9\"     Head circumference: 38.1 cm Chest circumference: 38.1 cm          Labor Events and Shoulder Dystocia    Fetal Tracing Prior to Delivery: Category 1       Delivery (Maternal) (Provider to Complete) ([id number])           Blood Loss  Mother: Wyatt Bruno #[id number]     Start of Mother's Information      Delivery Blood Loss   Intrapartum & Postpartum: 24 1353 - 24 1502    Delivery Admission: 24 0957 - 24 1502         Intrapartum & Postpartum Delivery Admission    Total Surgical QBL Blood Loss (mL) Hospital Encounter 1145 mL 1145 mL    Total  1145 mL 1145 mL               End of " Mother's Information  Mother: Wyatt Bruno #[id number]                Delivery - Provider to Complete ([id number])    Delivering clinician: No Vallejo MD  Delivery Type (Choose the 1 that will go to the Birth History): , Low Transverse                          Priority: STAT      Specifics: Primary nulliparous     Indications for : Abruption     Other personnel:  Provider Role   Sylvester Gallardo MD Pelletier, Michelle E, RN                     Placenta    Removal: Manual Removal  Disposition: Hospital disposal             Anesthesia    Method: Spinal                    Presentation and Position       Occiput Anterior                     Sylvester Gallardo MD

## 2024-12-04 NOTE — PROGRESS NOTES
S: Transfer to postpartum  B: CSection birth @ 1357. Infant formula feeding. 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  A: Mother transferred to postpartum unit at 1600 via bed after completion of immediate recovery period. Patient oriented to room. Mother bonding well and in satisfactory condition upon transfer.  R: Anticipate routine postpartum care.
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
## 2024-12-04 NOTE — PROGRESS NOTES
Discharged patient to phase 2, post , vss, henson patent, scant blood on mckay-pad, pt required 25 IV benadryl for itchy skin after narcotics in OR.  No rash present but skin less itchy 15 minutes past benadryl.  Report given to JLUIS Pulido OB

## 2024-12-04 NOTE — PROGRESS NOTES
S: Triage Arrival  B: Wyatt lizama @ 38W 4D gestation who presents for high blood pressures in the clinic as well as increased swelling. Mom is a surrogate mom, adopting dads are at bedside.   A: EFM / EUM applied. FHT's 140 with moderate Variability, accels present Decels not present noted on strip. Contractions not present  R: Will notify MD to obtain further orders

## 2024-12-04 NOTE — PROGRESS NOTES
S: To OR via bed  B: Patient is a  who presented for induction of labor after having high blood pressures in the clinic decision was made to proceed with a C/S as patient continues to have bleeding    A: Transfer of care to Rosa BAZZI. T's 1026 prior to spinal placement.    R: Ready for CS.

## 2024-12-04 NOTE — PROGRESS NOTES
S: Induction of labor  B: Patient is a  who presents for scheduled induction of labor @ 38w4d gestation.   A: IV Oxytocin per OB protocol beginning at 2 mU/min. Patient has contractions irregular Pt rates her pain at a 0/10. Understands that she will be on continuous EFM throughout induction. FHR baseline is 135 with moderate variability. Patient agrees with plan of care.   R: Will observe for active phase of labor and fetal tolerance.

## 2024-12-04 NOTE — PROGRESS NOTES
Dr. Vallejo in to see patient. SVE 2cm/50%/-1. Pts blood pressure continue to be elevated. Plan for induction

## 2024-12-04 NOTE — PROGRESS NOTES
Explained to patient about the procedure in details and its potential complications.  She is okay to be transfused if necessary.  In the rare case of hysterectomy needed she is also okay with it.  Consent was signed    No Vallejo MD.

## 2024-12-05 VITALS
TEMPERATURE: 98 F | WEIGHT: 243 LBS | HEART RATE: 81 BPM | DIASTOLIC BLOOD PRESSURE: 86 MMHG | OXYGEN SATURATION: 98 % | BODY MASS INDEX: 40.44 KG/M2 | SYSTOLIC BLOOD PRESSURE: 146 MMHG | RESPIRATION RATE: 18 BRPM

## 2024-12-05 LAB
ALBUMIN MFR UR ELPH: <6 MG/DL
ALBUMIN SERPL BCG-MCNC: 3.1 G/DL (ref 3.5–5.2)
ALP SERPL-CCNC: 169 U/L (ref 40–150)
ALT SERPL W P-5'-P-CCNC: 16 U/L (ref 0–50)
ANION GAP SERPL CALCULATED.3IONS-SCNC: 11 MMOL/L (ref 7–15)
AST SERPL W P-5'-P-CCNC: 24 U/L (ref 0–45)
BILIRUB SERPL-MCNC: <0.2 MG/DL
BUN SERPL-MCNC: 11.2 MG/DL (ref 6–20)
CALCIUM SERPL-MCNC: 8.8 MG/DL (ref 8.8–10.4)
CHLORIDE SERPL-SCNC: 105 MMOL/L (ref 98–107)
CREAT SERPL-MCNC: 0.8 MG/DL (ref 0.51–0.95)
CREAT UR-MCNC: 36.1 MG/DL
EGFRCR SERPLBLD CKD-EPI 2021: >90 ML/MIN/1.73M2
ERYTHROCYTE [DISTWIDTH] IN BLOOD BY AUTOMATED COUNT: 15.5 % (ref 10–15)
GLUCOSE SERPL-MCNC: 119 MG/DL (ref 70–99)
HCO3 SERPL-SCNC: 21 MMOL/L (ref 22–29)
HCT VFR BLD AUTO: 27.7 % (ref 35–47)
HGB BLD-MCNC: 9.1 G/DL (ref 11.7–15.7)
HGB BLD-MCNC: 9.6 G/DL (ref 11.7–15.7)
MCH RBC QN AUTO: 28.3 PG (ref 26.5–33)
MCHC RBC AUTO-ENTMCNC: 32.9 G/DL (ref 31.5–36.5)
MCV RBC AUTO: 86 FL (ref 78–100)
PLATELET # BLD AUTO: 250 10E3/UL (ref 150–450)
POTASSIUM SERPL-SCNC: 4.1 MMOL/L (ref 3.4–5.3)
PROT SERPL-MCNC: 5.6 G/DL (ref 6.4–8.3)
PROT/CREAT 24H UR: NORMAL MG/G{CREAT}
RBC # BLD AUTO: 3.22 10E6/UL (ref 3.8–5.2)
SODIUM SERPL-SCNC: 137 MMOL/L (ref 135–145)
T PALLIDUM AB SER QL: NONREACTIVE
WBC # BLD AUTO: 11.4 10E3/UL (ref 4–11)

## 2024-12-05 PROCEDURE — 85018 HEMOGLOBIN: CPT | Performed by: FAMILY MEDICINE

## 2024-12-05 PROCEDURE — 250N000013 HC RX MED GY IP 250 OP 250 PS 637: Performed by: NURSE ANESTHETIST, CERTIFIED REGISTERED

## 2024-12-05 PROCEDURE — 84155 ASSAY OF PROTEIN SERUM: CPT | Performed by: FAMILY MEDICINE

## 2024-12-05 PROCEDURE — 36415 COLL VENOUS BLD VENIPUNCTURE: CPT | Performed by: FAMILY MEDICINE

## 2024-12-05 PROCEDURE — 250N000013 HC RX MED GY IP 250 OP 250 PS 637: Performed by: FAMILY MEDICINE

## 2024-12-05 PROCEDURE — 82565 ASSAY OF CREATININE: CPT | Performed by: FAMILY MEDICINE

## 2024-12-05 PROCEDURE — 84156 ASSAY OF PROTEIN URINE: CPT | Performed by: FAMILY MEDICINE

## 2024-12-05 PROCEDURE — 84295 ASSAY OF SERUM SODIUM: CPT | Performed by: FAMILY MEDICINE

## 2024-12-05 PROCEDURE — 250N000011 HC RX IP 250 OP 636: Performed by: FAMILY MEDICINE

## 2024-12-05 RX ORDER — FERROUS SULFATE 325(65) MG
325 TABLET ORAL DAILY
Qty: 30 TABLET | Refills: 0 | Status: SHIPPED | OUTPATIENT
Start: 2024-12-06 | End: 2025-01-05

## 2024-12-05 RX ORDER — LABETALOL 100 MG/1
100 TABLET, FILM COATED ORAL 2 TIMES DAILY
Qty: 60 TABLET | Refills: 0 | Status: SHIPPED | OUTPATIENT
Start: 2024-12-05

## 2024-12-05 RX ORDER — LABETALOL 100 MG/1
100 TABLET, FILM COATED ORAL EVERY 12 HOURS SCHEDULED
Status: DISCONTINUED | OUTPATIENT
Start: 2024-12-05 | End: 2024-12-05 | Stop reason: HOSPADM

## 2024-12-05 RX ORDER — IBUPROFEN 800 MG/1
800 TABLET, FILM COATED ORAL EVERY 6 HOURS
COMMUNITY
Start: 2024-12-05

## 2024-12-05 RX ORDER — IBUPROFEN 800 MG/1
800 TABLET, FILM COATED ORAL EVERY 6 HOURS
Status: DISCONTINUED | OUTPATIENT
Start: 2024-12-05 | End: 2024-12-05 | Stop reason: HOSPADM

## 2024-12-05 RX ORDER — ACETAMINOPHEN 325 MG/1
975 TABLET ORAL EVERY 6 HOURS
COMMUNITY
Start: 2024-12-06

## 2024-12-05 RX ORDER — OXYCODONE HYDROCHLORIDE 5 MG/1
5 TABLET ORAL EVERY 4 HOURS PRN
Qty: 12 TABLET | Refills: 0 | Status: SHIPPED | OUTPATIENT
Start: 2024-12-05

## 2024-12-05 RX ADMIN — DIPHENHYDRAMINE HYDROCHLORIDE 25 MG: 25 CAPSULE ORAL at 04:20

## 2024-12-05 RX ADMIN — KETOROLAC TROMETHAMINE 30 MG: 30 INJECTION, SOLUTION INTRAMUSCULAR at 03:33

## 2024-12-05 RX ADMIN — ACETAMINOPHEN 975 MG: 325 TABLET, FILM COATED ORAL at 18:48

## 2024-12-05 RX ADMIN — FERROUS SULFATE TAB 325 MG (65 MG ELEMENTAL FE) 325 MG: 325 (65 FE) TAB at 08:34

## 2024-12-05 RX ADMIN — ACETAMINOPHEN 975 MG: 325 TABLET, FILM COATED ORAL at 12:40

## 2024-12-05 RX ADMIN — IBUPROFEN 800 MG: 800 TABLET, FILM COATED ORAL at 16:18

## 2024-12-05 RX ADMIN — SENNOSIDES AND DOCUSATE SODIUM 1 TABLET: 8.6; 5 TABLET ORAL at 20:32

## 2024-12-05 RX ADMIN — IBUPROFEN 800 MG: 800 TABLET, FILM COATED ORAL at 20:32

## 2024-12-05 RX ADMIN — SENNOSIDES AND DOCUSATE SODIUM 1 TABLET: 8.6; 5 TABLET ORAL at 08:34

## 2024-12-05 RX ADMIN — ACETAMINOPHEN 975 MG: 325 TABLET, FILM COATED ORAL at 06:53

## 2024-12-05 RX ADMIN — LABETALOL HYDROCHLORIDE 100 MG: 100 TABLET, FILM COATED ORAL at 20:32

## 2024-12-05 RX ADMIN — IBUPROFEN 800 MG: 800 TABLET, FILM COATED ORAL at 10:14

## 2024-12-05 ASSESSMENT — ACTIVITIES OF DAILY LIVING (ADL)
ADLS_ACUITY_SCORE: 24
ADLS_ACUITY_SCORE: 20
ADLS_ACUITY_SCORE: 24
ADLS_ACUITY_SCORE: 20
ADLS_ACUITY_SCORE: 24
ADLS_ACUITY_SCORE: 24

## 2024-12-05 NOTE — ANESTHESIA POSTPROCEDURE EVALUATION
Patient: Wyatt Bruno    Procedure: Procedure(s):   SECTION       Anesthesia Type:  Spinal    Note:  Disposition: Inpatient   Postop Pain Control: Uneventful            Sign Out: Well controlled pain   PONV: No   Neuro/Psych: Uneventful            Sign Out: Acceptable/Baseline neuro status   Airway/Respiratory: Uneventful            Sign Out: Acceptable/Baseline resp. status   CV/Hemodynamics: Uneventful            Sign Out: Acceptable CV status; No obvious hypovolemia; No obvious fluid overload   Other NRE: NONE   DID A NON-ROUTINE EVENT OCCUR? No    Event details/Postop Comments:  Planning to discharge home today.           Last vitals:  Vitals Value Taken Time   /79 24 1530   Temp 98.6  F (37  C) 24 1520   Pulse 71 24 1530   Resp 15 24 1530   SpO2 96 % 24 1530       Electronically Signed By: JEFF Walton CRNA  2024  11:08 AM

## 2024-12-05 NOTE — PROGRESS NOTES
S: Shift note    B:  delivered Primary C section on . Surrogate    A: VSS, Voiding spontaneously. Up independent in room. Pain tolerated with scheduled IV toradol and PO tylenol. FF @ U, rubra light to scant. Voiding spontaneously, up SBA in room. Incision without signs of infection or drainage.    R: Continue routing PP cares.

## 2024-12-05 NOTE — PROGRESS NOTES
Mitchell out at 2130. Patient sleeping between cares. Encouraged to attempt void at this time, as patient declined need earlier. Patient up SBA to bathroom. Voided 300ml.

## 2024-12-05 NOTE — PROGRESS NOTES
S:  Section Delivery  B: Primary  Section due to possible abruption. Mom is a surrogate mom  A: Baby delivered, cord clamping delayed for 60-90 seconds, then brought to pre- warmed infant warmer. Infant stimulated and dried. I Apgars 9/9.

## 2024-12-06 NOTE — PLAN OF CARE
S: Discharge  to home    B: Patient had a  delivery with complications patient had bleeding and passed large clot requiring csection. Mom is a surrogate mom . Baby boy Baby's name Justin.    A: Elevated blood pressures prior to discharge, started on labetalol. Was given a blood pressure cuff. Using ibuprofen and tylenol for pain. Scant flow, fundus UU. Voiding adequately. Incision clean dry and intact    R:  Discharge instructions reviewed and questions answered.  Belongings gathered and returned to the patient. Agreed to follow up in 5 days and 6 weeks or sooner with any question or concerns.     Nursing Discharge Checklist:    Pneumovax screened and given, if appropriate: NO  Influenza vaccine screened and given, if appropriate: NO  Staples removed (): NO  Breast milk returned: NO  Hydrogel pads sent home:NO  Birth Certificate Done: YES  Public Health Referral Made: NO

## 2024-12-06 NOTE — PROGRESS NOTES
Dr. Gallardo in to see patient and discuss blood pressures. Patient tearful about having to stay.

## 2024-12-06 NOTE — DISCHARGE SUMMARY
[facility] Discharge Summary    Wyatt Bruno MRN# [id number]   Age: 34 year old YOB: 1990     Date of Admission:  2024  Date of Discharge::  2024  Admitting Physician:  Sylvester Gallardo MD  Discharge Physician:  Sylvester Gallardo MD            Admission Diagnoses:   Encounter for triage in pregnant patient  Term pregnancy  Gestational hypertension  Normal labor  Active Problems:    Scar of abdomen-vertical/irregular    Encounter for triage in pregnant patient    Surrogate pregnancy    High-risk pregnancy supervision, unspecified trimester    Gestational hypertension    Term pregnancy    Normal labor               Discharge Diagnosis:     Active Problems:    Scar of abdomen-vertical/irregular    Encounter for triage in pregnant patient    Surrogate pregnancy    High-risk pregnancy supervision, unspecified trimester    Gestational hypertension    Term pregnancy    Normal labor               Procedures:     Procedure(s): Primary low transverse  section            Medications Prior to Admission:     Medications Prior to Admission   Medication Sig Dispense Refill Last Dose/Taking    [DISCONTINUED] aspirin 81 MG EC tablet Take 1 tablet (81 mg) by mouth daily 90 tablet 3 2024 at  6:00 AM    [DISCONTINUED] docusate (COLACE) 50 MG/5ML liquid Take 50 mg by mouth daily.   2024 at  6:00 AM    [DISCONTINUED] Prenatal Vit-Fe Fumarate-FA (PRENATAL MULTIVITAMIN  PLUS IRON) 27-1 MG TABS Take by mouth daily   2024 at  6:00 AM             Discharge Medications:     Current Discharge Medication List        START taking these medications    Details   acetaminophen (TYLENOL) 325 MG tablet Take 3 tablets (975 mg) by mouth every 6 hours.    Associated Diagnoses: S/P  section      ferrous sulfate (FEROSUL) 325 (65 Fe) MG tablet Take 1 tablet (325 mg) by mouth daily.  Qty: 30 tablet, Refills: 0    Associated Diagnoses: S/P  section      ibuprofen (ADVIL/MOTRIN) 800  MG tablet Take 1 tablet (800 mg) by mouth every 6 hours.    Associated Diagnoses: S/P  section      labetalol (NORMODYNE) 100 MG tablet Take 1 tablet (100 mg) by mouth 2 times daily.  Qty: 60 tablet, Refills: 0    Associated Diagnoses: Gestational hypertension, third trimester      oxyCODONE (ROXICODONE) 5 MG tablet Take 1 tablet (5 mg) by mouth every 4 hours as needed for moderate to severe pain.  Qty: 12 tablet, Refills: 0    Associated Diagnoses: S/P  section           STOP taking these medications       aspirin 81 MG EC tablet Comments:   Reason for Stopping:         docusate (COLACE) 50 MG/5ML liquid Comments:   Reason for Stopping:         Prenatal Vit-Fe Fumarate-FA (PRENATAL MULTIVITAMIN  PLUS IRON) 27-1 MG TABS Comments:   Reason for Stopping:                       Consultations:   No consultations were requested during this admission          Brief History of Labor or Admission:   Presented for IOL and had persistent vag bleeding. Suspected abruption and had STAT CS.            Hospital Course:   The patient's hospital course was remarkable for developing PIH. She is discharged with a cuff for monitoring. Labs were wnl. 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 She'd prefer to be home with her family. Was given strict monitoring and s/s instructions. And can call directly if problems. She recovered as anticipated and experienced nopost-operative complications. On discharge, her pain was well controlled. Vaginal bleeding is similar to peak menstrual flow.  Voiding without difficulty.  Ambulating well and tolerating a normal diet.  No fever or significant wound drainage. She was a surrogate mom and baby is with adoptive parents. She was discharged on post-partum day #1.    Cont on labetolol for PIH    Oral Fe for anemia    Post-partum hemoglobin:   Hemoglobin   Date Value Ref Range Status   2024 9.1 (L) 11.7 - 15.7 g/dL Final   2019 9.8 (L)  11.7 - 15.7 g/dL Final             Discharge Instructions and Follow-Up:     Discharge diet: Advance to a regular diet as tolerated   Discharge activity: No heavy lifting, pushing, pulling for 2 week(s)  Pelvic rest: abstain from intercourse and do not use tampons for 2 week(s) and pain free   Discharge follow-up: Follow up with primary care provider in 6-8 weeks, sooner if BP issues and directed by OB staff   Wound care: Drink plenty of fluids  Ice to area for comfort  Keep wound clean and dry           Discharge Disposition:     Discharged to home          Sylvester Gallardo MD

## 2024-12-06 NOTE — PROGRESS NOTES
Change of plan and going to discharge home with blood pressure cuff and on labetalol. Will call with issues. Feels good with this plan and will follow up as needed.

## 2024-12-06 NOTE — DISCHARGE INSTRUCTIONS
Section: What to Expect at Home  Your Recovery     A  section, or , is surgery to deliver your baby through a cut that the doctor makes in your lower belly and uterus. The cut is called an incision.  You may have some pain in your lower belly and need pain medicine for 1 to 2 weeks. You can expect some vaginal bleeding for several weeks. You will probably need about 6 weeks to fully recover.  It's important to take it easy while the incision heals. Avoid heavy lifting, strenuous activities, and exercises that strain the belly muscles while you recover. Ask a family member or friend for help with housework, cooking, and shopping.  This care sheet gives you a general idea about how long it will take for you to recover. But each person recovers at a different pace. Follow the steps below to get better as quickly as possible.  How can you care for yourself at home?  Activity    Rest when you feel tired. Getting enough sleep will help you recover.     Try to walk each day. Start by walking a little more than you did the day before. Bit by bit, increase the amount you walk. Walking boosts blood flow and helps prevent pneumonia, constipation, and blood clots.     Avoid strenuous activities, such as bicycle riding, jogging, weightlifting, and aerobic exercise, for 6 weeks or until your doctor says it is okay.     Until your doctor says it is okay, do not lift anything heavier than your baby.     Do not do sit-ups or other exercises that strain the belly muscles for 6 weeks or until your doctor says it is okay.     Hold a pillow over your incision when you cough or take deep breaths. This will support your belly and decrease your pain.     You may shower as usual. Pat the incision dry when you are done.     You will have some vaginal bleeding. Wear sanitary pads. Do not douche or use tampons until your doctor says it is okay.     Ask your doctor when you can drive again.     You will probably need  to take at least 6 weeks off work. It depends on the type of work you do and how you feel.     Ask your doctor when it is okay for you to have sex.   Diet    You can eat your normal diet. If your stomach is upset, try bland, low-fat foods like plain rice, broiled chicken, toast, and yogurt.     Drink plenty of fluids (unless your doctor tells you not to).     You may notice that your bowel movements are not regular right after your surgery. This is common. Try to avoid constipation and straining with bowel movements. You may want to take a fiber supplement every day. If you have not had a bowel movement after a couple of days, ask your doctor about taking a mild laxative.     If you are breastfeeding, limit alcohol. Alcohol can cause a lack of energy and other health problems for the baby when a breastfeeding woman drinks heavily. It can also get in the way of a mom's ability to feed her baby or to care for the child in other ways. There isn't a lot of research about exactly how much alcohol can harm a baby. Having no alcohol is the safest choice for your baby. If you choose to have a drink now and then, have only one drink, and limit the number of occasions that you have a drink. Wait to breastfeed at least 2 hours after you have a drink to reduce the amount of alcohol the baby may get in the milk.   Medicines    Your doctor will tell you if and when you can restart your medicines. You will also get instructions about taking any new medicines.     If you stopped taking aspirin or some other blood thinner, your doctor will tell you when to start taking it again.     Take pain medicines exactly as directed.  If the doctor gave you a prescription medicine for pain, take it as prescribed.  If you are not taking a prescription pain medicine, ask your doctor if you can take an over-the-counter medicine.     If you think your pain medicine is making you sick to your stomach:  Take your medicine after meals (unless your  doctor has told you not to).  Ask your doctor for a different pain medicine.     If your doctor prescribed antibiotics, take them as directed. Do not stop taking them just because you feel better. You need to take the full course of antibiotics.   Incision care    If you have strips of tape on the incision, leave the tape on for a week or until it falls off.     Wash the area daily with warm, soapy water, and pat it dry. Don't use hydrogen peroxide or alcohol, which can slow healing. You may cover the area with a gauze bandage if it weeps or rubs against clothing. Change the bandage every day.     Keep the area clean and dry.   Other instructions    If you breastfeed your baby, you may be more comfortable while you are healing if you don't rest your baby on your belly. Try tucking your baby under your arm, with your baby's body along the side you will be feeding on. Support your baby's upper body with your arm. With that hand you can control your baby's head to bring your baby's mouth to your breast. This is sometimes called the football hold.   Follow-up care is a key part of your treatment and safety. Be sure to make and go to all appointments, and call your doctor if you are having problems. It's also a good idea to know your test results and keep a list of the medicines you take.  When should you call for help?  Share this information with your partner, family, or a friend. They can help you watch for warning signs.  Call 911  anytime you think you may need emergency care. For example, call if:    You feel you cannot stop from hurting yourself, your baby, or someone else.     You passed out (lost consciousness).     You have chest pain, are short of breath, or cough up blood.     You have a seizure.   Where to get help 24 hours a day, 7 days a week   If you or someone you know talks about suicide, self-harm, a mental health crisis, a substance use crisis, or any other kind of emotional distress, get help right  away. You can:    Call the Suicide and Crisis Lifeline at 988.     Call 1-252-946-TALK (1-890.872.9052).     Text HOME to 659709 to access the Crisis Text Line.   Consider saving these numbers in your phone.  Go to [email or web address] for more information or to chat online.  Call your doctor or midwife now or seek immediate medical care if:    You have loose stitches, or your incision comes open.     You have signs of hemorrhage (too much bleeding), such as:  Heavy vaginal bleeding. This means that you are soaking through one or more pads in an hour. Or you pass blood clots bigger than an egg.  Feeling dizzy or lightheaded, or you feel like you may faint.  Feeling so tired or weak that you cannot do your usual activities.  A fast or irregular heartbeat.  New or worse belly pain.     You have symptoms of infection, such as:  Increased pain, swelling, warmth, or redness.  Red streaks leading from the incision.  Pus draining from the incision.  A fever.  Frequent or painful urination or blood in your urine.  Vaginal discharge that smells bad.  New or worse belly pain.     You have symptoms of a blood clot in your leg (called a deep vein thrombosis), such as:  Pain in the calf, back of the knee, thigh, or groin.  Swelling in the leg or groin.  A color change on the leg or groin. The skin may be reddish or purplish, depending on your usual skin color.     You have signs of preeclampsia, such as:  Sudden swelling of your face, hands, or feet.  New vision problems (such as dimness, blurring, or seeing spots).  A severe headache.     You have signs of heart failure, such as:  New or increased shortness of breath.  New or worse swelling in your legs, ankles, or feet.  Sudden weight gain, such as more than 2 to 3 pounds in a day or 5 pounds in a week.  Feeling so tired or weak that you cannot do your usual activities.     You had spinal or epidural pain relief and have:  New or worse back pain.  Increased pain, swelling, warmth,  "or redness at the injection site.  Tingling, weakness, or numbness in your legs or groin.   Watch closely for changes in your health, and be sure to contact your doctor or midwife if:    Your vaginal bleeding isn't decreasing.     You feel sad, anxious, or hopeless for more than a few days.     You are having problems with your breasts or breastfeeding.   Where can you learn more?  Go to https://www.[employer].net/patiented  Enter M806 in the search box to learn more about \" Section: What to Expect at Home.\"  Current as of: July 10, 2023  Content Version: 14.2024 [employer].   Care instructions adapted under license by your healthcare professional. If you have questions about a medical condition or this instruction, always ask your healthcare professional. [employer] disclaims any warranty or liability for your use of this information.    "

## 2024-12-08 NOTE — PROGRESS NOTES
Wyatt Bruno  Gender: female  : 1990  [id number] [facility] [id number] [phone] (home)   Medical Record: [id number]  Primary Care Provider: No Ref-Primary, Physician       [facility]   ?   Discharge Phone Call: Key Words/Key Times     How are you and the baby? Pt was a surrogate      Any questions or concerns? No, everything is going well.          We want to provide excellent care here at [facility]. Do you have any feedback for us that would help us improve? Everything was good. I like going there.

## 2024-12-10 ENCOUNTER — OFFICE VISIT (OUTPATIENT)
Dept: FAMILY MEDICINE | Facility: CLINIC | Age: 34
End: 2024-12-10
Payer: COMMERCIAL

## 2024-12-10 ASSESSMENT — PAIN SCALES - GENERAL: PAINLEVEL_OUTOF10: MILD PAIN (3)

## 2024-12-10 NOTE — PROGRESS NOTES
Assessment & Plan       ICD-10-CM    1. Postpartum hypertension  O16.5            Doing well.  Denies signs of preeclampsia.  Home blood pressures ranging 120-150 systolic.  Predominantly in the 130s.  Checking regularly.  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  Increase labetalol to 200 mg twice daily.  Call if blood pressures remain elevated.  Discussed signs symptoms of hypotension and reducing her antihypertensives.    Return in about 1 month (around 1/10/2025).    Sylvester Gallardo MD  [facility] NANCY Schneider is a 34 year old, presenting for the following health issues:  Postpartum Care and Hypertension        12/10/2024     2:22 PM   Additional Questions   Roomed by Ana   Accompanied by son         12/10/2024     2:22 PM   Patient Reported Additional Medications   Patient reports taking the following new medications none     HPI        Hospital Follow-up Visit:    Hospital/Nursing Home/ Rehab Facility: [facility]  Date of Admission: 2024  Date of Discharge: 2024  Reason(s) for Admission:  section  Was the patient in the ICU or did the patient experience delirium during hospitalization?  No  Do you have any other stressors you would like to discuss with your provider? No    Problems taking medications regularly:  None  Medication changes since discharge: None  Problems adhering to non-medication therapy:  None    Summary of hospitalization:    Diagnostic Tests/Treatments reviewed.  Follow up needed:   Other Healthcare Providers Involved in Patient s Care:         None  Update since discharge: improved.         Plan of care communicated with patient           Hypertension Follow-up    Do you check your blood pressure regularly outside of the clinic? Yes   Are you following a low salt diet? No  Are your blood pressures ever more than 140 on the top number (systolic) OR more   than 90 on the bottom number (diastolic), for example  "140/90? Yes  How many servings of fruits and vegetables do you eat daily?  2-3  On average, how many sweetened beverages do you drink each day (Examples: soda, juice, sweet tea, etc.  Do NOT count diet or artificially sweetened beverages)?   0  How many days per week do you exercise enough to make your heart beat faster? 3 or less  How many minutes a day do you exercise enough to make your heart beat faster? 9 or less  How many days per week do you miss taking your medication? 0      Review of Systems  Constitutional, HEENT, cardiovascular, pulmonary, gi and gu systems are negative, except as otherwise noted.      Objective    BP (!) 150/90 (BP Location: Right arm, Patient Position: Sitting, Cuff Size: Adult Large)   Pulse 79   Temp 99  F (37.2  C) (Temporal)   Resp 18   Ht 1.651 m (5' 5\")   Wt 107.2 kg (236 lb 6.4 oz)   SpO2 98%   Breastfeeding No   BMI 39.34 kg/m    Body mass index is 39.34 kg/m .  Physical Exam   GENERAL: alert and no distress  NECK: no adenopathy, no asymmetry, masses, or scars  RESP: lungs clear to auscultation - no rales, rhonchi or wheezes  CV: regular rate and rhythm, normal S1 S2, no S3 or S4, no murmur, click or rub, no peripheral edema  ABDOMEN: soft, nontender, no hepatosplenomegaly, no masses and bowel sounds normal  MS: no gross musculoskeletal defects noted, no edema            Signed Electronically by: Sylvester Gallardo MD    "

## 2024-12-11 VITALS
TEMPERATURE: 99 F | DIASTOLIC BLOOD PRESSURE: 90 MMHG | WEIGHT: 236.4 LBS | RESPIRATION RATE: 18 BRPM | OXYGEN SATURATION: 98 % | HEIGHT: 65 IN | SYSTOLIC BLOOD PRESSURE: 150 MMHG | BODY MASS INDEX: 39.39 KG/M2 | HEART RATE: 79 BPM

## 2024-12-16 NOTE — PROGRESS NOTES
"Pregnancy Issues              1. Surrogacy pregnancy via [facility]. Male couple from [place] adopting. Vag progesterone till 13w. Level 2 @ 20w, 28, 34, growth us. 36w BPPs. Plan for 39w IOL              2. Hist of HTN, DM, PPH with retained placenta in 2016, next 2 pregnancies normal/no retention              3. Precipitous deliveries              4. Posterior previa. --> now low lying              5. Abd surgery after accident. Spleen lac. Low abd scarring.              6. Macrosomic. EFW 95%        Reviewed  medical record.    Overall, doing well.  No concerns today.   Normal fetal movement   Rare, sporadic, at times painful contraction with increased pelvic pressure   - No cramping  No vaginal bleeding, abnormal discharge, or leakage.  No HA, abdominal pain, N/V, visual changes, or leg swelling  Surrogate pregnancy, is here with the parents.      Prenatal flowsheet information is reviewed.      BP (!) 140/80   Pulse 86   Temp 98.9  F (37.2  C) (Temporal)   Resp 20   Ht 1.651 m (5' 5\")   Wt 116.6 kg (257 lb)   SpO2 97%   BMI 42.77 kg/m         Recheck blood pressure: 150/78.    No history of high blood pressure.  No preeclamptic symptoms.  Normal neurological exam.  She was sent up to L&D for close monitoring and further evaluation.      I will follow and in manage her in L&D.      No Spears Mai, MD   "

## 2024-12-19 ENCOUNTER — MYC MEDICAL ADVICE (OUTPATIENT)
Dept: FAMILY MEDICINE | Facility: CLINIC | Age: 34
End: 2024-12-19
Payer: COMMERCIAL

## 2024-12-19 DIAGNOSIS — O13.3 GESTATIONAL HYPERTENSION, THIRD TRIMESTER: ICD-10-CM

## 2024-12-19 NOTE — LETTER
December 20, 2024      Wyatt Bruno  [address] [place] [id number]        To Whom It May Concern,     She recently had a baby. She may return to work part time, from home, up to 4 hrs per day, 5 days per week.           Sincerely,        Sylvester Gallardo MD

## 2024-12-20 RX ORDER — LABETALOL 100 MG/1
100 TABLET, FILM COATED ORAL 2 TIMES DAILY
Qty: 60 TABLET | Refills: 0 | Status: SHIPPED | OUTPATIENT
Start: 2024-12-20

## 2024-12-23 RX ORDER — LABETALOL 100 MG/1
TABLET, FILM COATED ORAL
Qty: 60 TABLET | Refills: 1 | Status: SHIPPED | OUTPATIENT
Start: 2024-12-23

## 2025-01-14 ENCOUNTER — OFFICE VISIT (OUTPATIENT)
Dept: FAMILY MEDICINE | Facility: CLINIC | Age: 35
End: 2025-01-14
Payer: COMMERCIAL

## 2025-01-14 VITALS
TEMPERATURE: 97.5 F | RESPIRATION RATE: 18 BRPM | HEIGHT: 65 IN | DIASTOLIC BLOOD PRESSURE: 80 MMHG | HEART RATE: 72 BPM | SYSTOLIC BLOOD PRESSURE: 120 MMHG | OXYGEN SATURATION: 98 % | BODY MASS INDEX: 36.42 KG/M2 | WEIGHT: 218.6 LBS

## 2025-01-14 ASSESSMENT — PAIN SCALES - GENERAL: PAINLEVEL_OUTOF10: NO PAIN (0)

## 2025-01-14 NOTE — PROGRESS NOTES
"33yo G4 now P4 after stat CS for abruption    PIH - resolved. Home BP's normalized  CS- stat due to suspected abruption.   Surrogate parent, feels good      History of Gestational Diabetes or hypertension? yes  Feeding Method:  Breast      REVIEW OF SYSTEMS:  12 system otherwise neg    Past medical history, problem list reviewed and updated as necessary    EXAM:  /80   Pulse 72   Temp 97.5  F (36.4  C) (Temporal)   Resp 18   Ht 1.651 m (5' 5\")   Wt 99.2 kg (218 lb 9.6 oz)   SpO2 98%   Breastfeeding No   BMI 36.38 kg/m        HEENT: grossly normal.  NECK: no lymphadenopathy or thyroidomegaly.  LUNGS: CTA X 2, no rales or crackles.  BACK: No spinal or CVA tenderness.  HEART: RRR without murmurs clicks or gallops.  ABDOMEN: soft, non tender, good bowel sounds, without masses rebound, guarding or tenderness.    EXTREMITIES:  warm to touch, good pulses, no ankle edema or calf tenderness.      ASSESSMENT:   6-week postpartum exam after CS.    PLAN:     Check hgb if low  Mood wnl  Breast feeding nl.  SO has vasectomy for contraception.  Considering another surrogacy.  She will wait 18 months before considering other pregnancy.    rh      "

## 2025-06-14 ENCOUNTER — HEALTH MAINTENANCE LETTER (OUTPATIENT)
Age: 35
End: 2025-06-14

## 2025-07-10 ENCOUNTER — TELEPHONE (OUTPATIENT)
Dept: FAMILY MEDICINE | Facility: CLINIC | Age: 35
End: 2025-07-10
Payer: COMMERCIAL

## 2025-07-21 ENCOUNTER — TELEPHONE (OUTPATIENT)
Dept: FAMILY MEDICINE | Facility: CLINIC | Age: 35
End: 2025-07-21
Payer: COMMERCIAL

## 2025-07-21 NOTE — TELEPHONE ENCOUNTER
Reason for Call:  Appointment Request    Patient requesting this type of appt: Chronic Diease Management/Medication/Follow-Up    Requested provider: SHANNON KIRK    Reason patient unable to be scheduled: Not within requested timeframe    When does patient want to be seen/preferred time: 1-2 weeks    Comments: PT NEEDING CLEARANCE FOR SURROGACY.     Could we send this information to you in [employer] or would you prefer to receive a phone call?:   No preference   Okay to leave a detailed message?: Yes at Home number on file [phone] (home) or Cell number on file:    Telephone Information:   Mobile [phone]       Call taken on 7/21/2025 at 4:18 PM by Bee Rock

## (undated) DEVICE — ESU GROUND PAD UNIVERSAL W/O CORD

## (undated) DEVICE — SUCTION MANIFOLD NEPTUNE 2 SYS 4 PORT 0702-020-000

## (undated) DEVICE — PACK C-SECTION

## (undated) DEVICE — PREP CHLORAPREP 26ML TINTED ORANGE  260815

## (undated) DEVICE — SU MONDERM 3-0 QUILL DA 18MM YA-1016Q

## (undated) DEVICE — CLAMP CORD

## (undated) DEVICE — CATH TRAY FOLEY 16FR DRAINAGE BAG STATLOCK 899916

## (undated) DEVICE — SOL NACL 0.9% IRRIG 1000ML BOTTLE 07138-09

## (undated) DEVICE — ADH SKIN CLOSURE PREMIERPRO EXOFIN 1.0ML 3470

## (undated) DEVICE — SOL WATER IRRIG 1000ML BOTTLE 2F7114

## (undated) DEVICE — SU MONOCRYL 0 CT-1 36" UND Y946H

## (undated) DEVICE — ESU PENCIL SMOKE EVAC W/ROCKER SWITCH 0703-047-000

## (undated) DEVICE — GLOVE PROTEXIS W/NEU-THERA 7.5  2D73TE75

## (undated) DEVICE — STOCKING SLEEVE COMPRESSION CALF MED

## (undated) RX ORDER — KETOROLAC TROMETHAMINE 30 MG/ML
INJECTION, SOLUTION INTRAMUSCULAR; INTRAVENOUS
Status: DISPENSED
Start: 2024-12-04

## (undated) RX ORDER — ONDANSETRON 2 MG/ML
INJECTION INTRAMUSCULAR; INTRAVENOUS
Status: DISPENSED
Start: 2024-12-04

## (undated) RX ORDER — FENTANYL CITRATE 50 UG/ML
INJECTION, SOLUTION INTRAMUSCULAR; INTRAVENOUS
Status: DISPENSED
Start: 2024-12-04

## (undated) RX ORDER — MORPHINE SULFATE 1 MG/ML
INJECTION, SOLUTION EPIDURAL; INTRATHECAL; INTRAVENOUS
Status: DISPENSED
Start: 2024-12-04

## (undated) RX ORDER — OXYTOCIN 10 [USP'U]/ML
INJECTION, SOLUTION INTRAMUSCULAR; INTRAVENOUS
Status: DISPENSED
Start: 2024-12-04